# Patient Record
Sex: FEMALE | Race: WHITE | NOT HISPANIC OR LATINO | URBAN - METROPOLITAN AREA
[De-identification: names, ages, dates, MRNs, and addresses within clinical notes are randomized per-mention and may not be internally consistent; named-entity substitution may affect disease eponyms.]

---

## 2019-08-22 ENCOUNTER — INPATIENT (INPATIENT)
Facility: HOSPITAL | Age: 64
LOS: 1 days | Discharge: HOME | DRG: 472 | End: 2019-08-24
Attending: SPECIALIST | Admitting: SPECIALIST
Payer: COMMERCIAL

## 2019-08-22 VITALS
DIASTOLIC BLOOD PRESSURE: 81 MMHG | RESPIRATION RATE: 18 BRPM | HEART RATE: 82 BPM | WEIGHT: 102.07 LBS | SYSTOLIC BLOOD PRESSURE: 133 MMHG | TEMPERATURE: 98 F | OXYGEN SATURATION: 97 % | HEIGHT: 60 IN

## 2019-08-22 DIAGNOSIS — Z41.9 ENCOUNTER FOR PROCEDURE FOR PURPOSES OTHER THAN REMEDYING HEALTH STATE, UNSPECIFIED: Chronic | ICD-10-CM

## 2019-08-22 DIAGNOSIS — M48.02 SPINAL STENOSIS, CERVICAL REGION: ICD-10-CM

## 2019-08-22 DIAGNOSIS — D62 ACUTE POSTHEMORRHAGIC ANEMIA: ICD-10-CM

## 2019-08-22 DIAGNOSIS — F41.1 GENERALIZED ANXIETY DISORDER: ICD-10-CM

## 2019-08-22 DIAGNOSIS — J30.9 ALLERGIC RHINITIS, UNSPECIFIED: ICD-10-CM

## 2019-08-22 LAB
ANION GAP SERPL CALC-SCNC: 11 MMOL/L — SIGNIFICANT CHANGE UP (ref 5–17)
BASOPHILS # BLD AUTO: 0 K/UL — SIGNIFICANT CHANGE UP (ref 0–0.2)
BASOPHILS NFR BLD AUTO: 0 % — SIGNIFICANT CHANGE UP (ref 0–2)
BUN SERPL-MCNC: 13 MG/DL — SIGNIFICANT CHANGE UP (ref 7–23)
CALCIUM SERPL-MCNC: 8.2 MG/DL — LOW (ref 8.4–10.5)
CHLORIDE SERPL-SCNC: 106 MMOL/L — SIGNIFICANT CHANGE UP (ref 96–108)
CO2 SERPL-SCNC: 23 MMOL/L — SIGNIFICANT CHANGE UP (ref 22–31)
CREAT SERPL-MCNC: 0.67 MG/DL — SIGNIFICANT CHANGE UP (ref 0.5–1.3)
EOSINOPHIL # BLD AUTO: 0 K/UL — SIGNIFICANT CHANGE UP (ref 0–0.5)
EOSINOPHIL NFR BLD AUTO: 0 % — SIGNIFICANT CHANGE UP (ref 0–6)
GLUCOSE SERPL-MCNC: 118 MG/DL — HIGH (ref 70–99)
HCT VFR BLD CALC: 32.6 % — LOW (ref 34.5–45)
HGB BLD-MCNC: 11.3 G/DL — LOW (ref 11.5–15.5)
LYMPHOCYTES # BLD AUTO: 0.23 K/UL — LOW (ref 1–3.3)
LYMPHOCYTES # BLD AUTO: 2.6 % — LOW (ref 13–44)
MCHC RBC-ENTMCNC: 32.3 PG — SIGNIFICANT CHANGE UP (ref 27–34)
MCHC RBC-ENTMCNC: 34.7 GM/DL — SIGNIFICANT CHANGE UP (ref 32–36)
MCV RBC AUTO: 93.1 FL — SIGNIFICANT CHANGE UP (ref 80–100)
MONOCYTES # BLD AUTO: 0.08 K/UL — SIGNIFICANT CHANGE UP (ref 0–0.9)
MONOCYTES NFR BLD AUTO: 0.9 % — LOW (ref 2–14)
NEUTROPHILS # BLD AUTO: 8.61 K/UL — HIGH (ref 1.8–7.4)
NEUTROPHILS NFR BLD AUTO: 78.1 % — HIGH (ref 43–77)
PLATELET # BLD AUTO: 212 K/UL — SIGNIFICANT CHANGE UP (ref 150–400)
POTASSIUM SERPL-MCNC: 3.8 MMOL/L — SIGNIFICANT CHANGE UP (ref 3.5–5.3)
POTASSIUM SERPL-SCNC: 3.8 MMOL/L — SIGNIFICANT CHANGE UP (ref 3.5–5.3)
RBC # BLD: 3.5 M/UL — LOW (ref 3.8–5.2)
RBC # FLD: 12.2 % — SIGNIFICANT CHANGE UP (ref 10.3–14.5)
SODIUM SERPL-SCNC: 140 MMOL/L — SIGNIFICANT CHANGE UP (ref 135–145)
WBC # BLD: 9.01 K/UL — SIGNIFICANT CHANGE UP (ref 3.8–10.5)
WBC # FLD AUTO: 9.01 K/UL — SIGNIFICANT CHANGE UP (ref 3.8–10.5)

## 2019-08-22 RX ORDER — ONDANSETRON 8 MG/1
4 TABLET, FILM COATED ORAL EVERY 6 HOURS
Refills: 0 | Status: DISCONTINUED | OUTPATIENT
Start: 2019-08-22 | End: 2019-08-24

## 2019-08-22 RX ORDER — OXYCODONE AND ACETAMINOPHEN 5; 325 MG/1; MG/1
1 TABLET ORAL EVERY 4 HOURS
Refills: 0 | Status: DISCONTINUED | OUTPATIENT
Start: 2019-08-22 | End: 2019-08-22

## 2019-08-22 RX ORDER — SODIUM CHLORIDE 9 MG/ML
1000 INJECTION, SOLUTION INTRAVENOUS
Refills: 0 | Status: DISCONTINUED | OUTPATIENT
Start: 2019-08-22 | End: 2019-08-24

## 2019-08-22 RX ORDER — MAGNESIUM HYDROXIDE 400 MG/1
30 TABLET, CHEWABLE ORAL EVERY 12 HOURS
Refills: 0 | Status: DISCONTINUED | OUTPATIENT
Start: 2019-08-22 | End: 2019-08-24

## 2019-08-22 RX ORDER — FAMOTIDINE 10 MG/ML
20 INJECTION INTRAVENOUS EVERY 12 HOURS
Refills: 0 | Status: DISCONTINUED | OUTPATIENT
Start: 2019-08-22 | End: 2019-08-24

## 2019-08-22 RX ORDER — OXYCODONE HYDROCHLORIDE 5 MG/1
10 TABLET ORAL EVERY 4 HOURS
Refills: 0 | Status: DISCONTINUED | OUTPATIENT
Start: 2019-08-22 | End: 2019-08-24

## 2019-08-22 RX ORDER — HYDROMORPHONE HYDROCHLORIDE 2 MG/ML
0.5 INJECTION INTRAMUSCULAR; INTRAVENOUS; SUBCUTANEOUS ONCE
Refills: 0 | Status: DISCONTINUED | OUTPATIENT
Start: 2019-08-22 | End: 2019-08-24

## 2019-08-22 RX ORDER — DOCUSATE SODIUM 100 MG
100 CAPSULE ORAL THREE TIMES A DAY
Refills: 0 | Status: DISCONTINUED | OUTPATIENT
Start: 2019-08-22 | End: 2019-08-24

## 2019-08-22 RX ORDER — CEFAZOLIN SODIUM 1 G
2000 VIAL (EA) INJECTION EVERY 8 HOURS
Refills: 0 | Status: COMPLETED | OUTPATIENT
Start: 2019-08-22 | End: 2019-08-23

## 2019-08-22 RX ORDER — ACETAMINOPHEN 500 MG
650 TABLET ORAL EVERY 6 HOURS
Refills: 0 | Status: DISCONTINUED | OUTPATIENT
Start: 2019-08-22 | End: 2019-08-24

## 2019-08-22 RX ORDER — CEFAZOLIN SODIUM 1 G
2000 VIAL (EA) INJECTION EVERY 8 HOURS
Refills: 0 | Status: DISCONTINUED | OUTPATIENT
Start: 2019-08-22 | End: 2019-08-22

## 2019-08-22 RX ORDER — BUPIVACAINE 13.3 MG/ML
20 INJECTION, SUSPENSION, LIPOSOMAL INFILTRATION ONCE
Refills: 0 | Status: DISCONTINUED | OUTPATIENT
Start: 2019-08-22 | End: 2019-08-24

## 2019-08-22 RX ORDER — OXYCODONE AND ACETAMINOPHEN 5; 325 MG/1; MG/1
2 TABLET ORAL EVERY 6 HOURS
Refills: 0 | Status: DISCONTINUED | OUTPATIENT
Start: 2019-08-22 | End: 2019-08-22

## 2019-08-22 RX ORDER — METOPROLOL TARTRATE 50 MG
200 TABLET ORAL DAILY
Refills: 0 | Status: DISCONTINUED | OUTPATIENT
Start: 2019-08-22 | End: 2019-08-24

## 2019-08-22 RX ORDER — HYDROMORPHONE HYDROCHLORIDE 2 MG/ML
0.5 INJECTION INTRAMUSCULAR; INTRAVENOUS; SUBCUTANEOUS
Refills: 0 | Status: DISCONTINUED | OUTPATIENT
Start: 2019-08-22 | End: 2019-08-22

## 2019-08-22 RX ORDER — HYDROMORPHONE HYDROCHLORIDE 2 MG/ML
0.5 INJECTION INTRAMUSCULAR; INTRAVENOUS; SUBCUTANEOUS
Refills: 0 | Status: DISCONTINUED | OUTPATIENT
Start: 2019-08-22 | End: 2019-08-24

## 2019-08-22 RX ORDER — SENNA PLUS 8.6 MG/1
2 TABLET ORAL AT BEDTIME
Refills: 0 | Status: DISCONTINUED | OUTPATIENT
Start: 2019-08-22 | End: 2019-08-24

## 2019-08-22 RX ORDER — NALOXONE HYDROCHLORIDE 4 MG/.1ML
0.1 SPRAY NASAL
Refills: 0 | Status: DISCONTINUED | OUTPATIENT
Start: 2019-08-22 | End: 2019-08-24

## 2019-08-22 RX ORDER — HYDROMORPHONE HYDROCHLORIDE 2 MG/ML
0.5 INJECTION INTRAMUSCULAR; INTRAVENOUS; SUBCUTANEOUS ONCE
Refills: 0 | Status: DISCONTINUED | OUTPATIENT
Start: 2019-08-22 | End: 2019-08-22

## 2019-08-22 RX ORDER — HYDROMORPHONE HYDROCHLORIDE 2 MG/ML
0.5 INJECTION INTRAMUSCULAR; INTRAVENOUS; SUBCUTANEOUS EVERY 4 HOURS
Refills: 0 | Status: DISCONTINUED | OUTPATIENT
Start: 2019-08-22 | End: 2019-08-22

## 2019-08-22 RX ORDER — HYDROMORPHONE HYDROCHLORIDE 2 MG/ML
30 INJECTION INTRAMUSCULAR; INTRAVENOUS; SUBCUTANEOUS
Refills: 0 | Status: DISCONTINUED | OUTPATIENT
Start: 2019-08-22 | End: 2019-08-22

## 2019-08-22 RX ORDER — CHLORHEXIDINE GLUCONATE 213 G/1000ML
1 SOLUTION TOPICAL ONCE
Refills: 0 | Status: COMPLETED | OUTPATIENT
Start: 2019-08-22 | End: 2019-08-22

## 2019-08-22 RX ORDER — OXYCODONE HYDROCHLORIDE 5 MG/1
5 TABLET ORAL EVERY 4 HOURS
Refills: 0 | Status: DISCONTINUED | OUTPATIENT
Start: 2019-08-22 | End: 2019-08-24

## 2019-08-22 RX ORDER — CYCLOBENZAPRINE HYDROCHLORIDE 10 MG/1
5 TABLET, FILM COATED ORAL THREE TIMES A DAY
Refills: 0 | Status: DISCONTINUED | OUTPATIENT
Start: 2019-08-22 | End: 2019-08-24

## 2019-08-22 RX ORDER — LEVOTHYROXINE SODIUM 125 MCG
75 TABLET ORAL DAILY
Refills: 0 | Status: DISCONTINUED | OUTPATIENT
Start: 2019-08-22 | End: 2019-08-24

## 2019-08-22 RX ADMIN — HYDROMORPHONE HYDROCHLORIDE 0.5 MILLIGRAM(S): 2 INJECTION INTRAMUSCULAR; INTRAVENOUS; SUBCUTANEOUS at 15:46

## 2019-08-22 RX ADMIN — OXYCODONE HYDROCHLORIDE 5 MILLIGRAM(S): 5 TABLET ORAL at 19:38

## 2019-08-22 RX ADMIN — HYDROMORPHONE HYDROCHLORIDE 0.5 MILLIGRAM(S): 2 INJECTION INTRAMUSCULAR; INTRAVENOUS; SUBCUTANEOUS at 16:44

## 2019-08-22 RX ADMIN — SENNA PLUS 2 TABLET(S): 8.6 TABLET ORAL at 22:45

## 2019-08-22 RX ADMIN — Medication 100 MILLIGRAM(S): at 22:45

## 2019-08-22 RX ADMIN — HYDROMORPHONE HYDROCHLORIDE 0.5 MILLIGRAM(S): 2 INJECTION INTRAMUSCULAR; INTRAVENOUS; SUBCUTANEOUS at 18:23

## 2019-08-22 RX ADMIN — OXYCODONE HYDROCHLORIDE 10 MILLIGRAM(S): 5 TABLET ORAL at 23:01

## 2019-08-22 RX ADMIN — HYDROMORPHONE HYDROCHLORIDE 0.5 MILLIGRAM(S): 2 INJECTION INTRAMUSCULAR; INTRAVENOUS; SUBCUTANEOUS at 15:38

## 2019-08-22 RX ADMIN — SODIUM CHLORIDE 100 MILLILITER(S): 9 INJECTION, SOLUTION INTRAVENOUS at 16:03

## 2019-08-22 RX ADMIN — HYDROMORPHONE HYDROCHLORIDE 0.5 MILLIGRAM(S): 2 INJECTION INTRAMUSCULAR; INTRAVENOUS; SUBCUTANEOUS at 16:01

## 2019-08-22 RX ADMIN — CHLORHEXIDINE GLUCONATE 1 APPLICATION(S): 213 SOLUTION TOPICAL at 07:24

## 2019-08-22 RX ADMIN — HYDROMORPHONE HYDROCHLORIDE 0.5 MILLIGRAM(S): 2 INJECTION INTRAMUSCULAR; INTRAVENOUS; SUBCUTANEOUS at 15:14

## 2019-08-22 RX ADMIN — Medication 2000 MILLIGRAM(S): at 22:45

## 2019-08-22 RX ADMIN — OXYCODONE HYDROCHLORIDE 5 MILLIGRAM(S): 5 TABLET ORAL at 20:38

## 2019-08-22 NOTE — H&P ADULT - HISTORY OF PRESENT ILLNESS
8/22/19 730a  63 y.o F with neck pain x chronic. Symptoms continue despite conservative treatment.    Presents today for elective cervical spine surgery - cervical fusion/decompression.  Denies radiating pain or numbness. Subjectively some intermittent weakness of UE (dropping objects. Denies incontinence or LE  symptoms. No fever, chills , or recent illness. No other complaints.     Right hand dominant. Independent ambulator.

## 2019-08-22 NOTE — H&P ADULT - NSICDXPASTSURGICALHX_GEN_ALL_CORE_FT
PAST SURGICAL HISTORY:  Surgery, elective left foot x 6 surgeries    Surgery, elective shoulder -right x 3    Surgery, elective infertility procedures

## 2019-08-22 NOTE — BRIEF OPERATIVE NOTE - NSICDXBRIEFPROCEDURE_GEN_ALL_CORE_FT
PROCEDURES:  Anterior cervical discectomy and fusion (ACDF) at 4 levels 22-Aug-2019 15:30:49  Carito Russell

## 2019-08-22 NOTE — H&P ADULT - NSHPPHYSICALEXAM_GEN_ALL_CORE
NAD    MSK:  Cervical spine ROM limited secondary to pain  gross sensation intact to b/l UE/LE to light touch  neuromotor intact b/l UE/LE distally  calf soft, compressible b/l   radial palpable    rest of PE per medical clearance note

## 2019-08-23 DIAGNOSIS — E03.9 HYPOTHYROIDISM, UNSPECIFIED: ICD-10-CM

## 2019-08-23 DIAGNOSIS — Z86.39 PERSONAL HISTORY OF OTHER ENDOCRINE, NUTRITIONAL AND METABOLIC DISEASE: ICD-10-CM

## 2019-08-23 LAB
ANION GAP SERPL CALC-SCNC: 12 MMOL/L — SIGNIFICANT CHANGE UP (ref 5–17)
BASOPHILS # BLD AUTO: 0.01 K/UL — SIGNIFICANT CHANGE UP (ref 0–0.2)
BASOPHILS NFR BLD AUTO: 0.2 % — SIGNIFICANT CHANGE UP (ref 0–2)
BUN SERPL-MCNC: 9 MG/DL — SIGNIFICANT CHANGE UP (ref 7–23)
CALCIUM SERPL-MCNC: 8.6 MG/DL — SIGNIFICANT CHANGE UP (ref 8.4–10.5)
CHLORIDE SERPL-SCNC: 104 MMOL/L — SIGNIFICANT CHANGE UP (ref 96–108)
CO2 SERPL-SCNC: 25 MMOL/L — SIGNIFICANT CHANGE UP (ref 22–31)
CREAT SERPL-MCNC: 0.79 MG/DL — SIGNIFICANT CHANGE UP (ref 0.5–1.3)
EOSINOPHIL # BLD AUTO: 0.01 K/UL — SIGNIFICANT CHANGE UP (ref 0–0.5)
EOSINOPHIL NFR BLD AUTO: 0.2 % — SIGNIFICANT CHANGE UP (ref 0–6)
GLUCOSE SERPL-MCNC: 101 MG/DL — HIGH (ref 70–99)
HCT VFR BLD CALC: 32.2 % — LOW (ref 34.5–45)
HCV AB S/CO SERPL IA: 0.12 S/CO — SIGNIFICANT CHANGE UP
HCV AB SERPL-IMP: SIGNIFICANT CHANGE UP
HGB BLD-MCNC: 11 G/DL — LOW (ref 11.5–15.5)
IMM GRANULOCYTES NFR BLD AUTO: 0.3 % — SIGNIFICANT CHANGE UP (ref 0–1.5)
LYMPHOCYTES # BLD AUTO: 0.89 K/UL — LOW (ref 1–3.3)
LYMPHOCYTES # BLD AUTO: 13.7 % — SIGNIFICANT CHANGE UP (ref 13–44)
MCHC RBC-ENTMCNC: 32.7 PG — SIGNIFICANT CHANGE UP (ref 27–34)
MCHC RBC-ENTMCNC: 34.2 GM/DL — SIGNIFICANT CHANGE UP (ref 32–36)
MCV RBC AUTO: 95.8 FL — SIGNIFICANT CHANGE UP (ref 80–100)
MONOCYTES # BLD AUTO: 0.23 K/UL — SIGNIFICANT CHANGE UP (ref 0–0.9)
MONOCYTES NFR BLD AUTO: 3.5 % — SIGNIFICANT CHANGE UP (ref 2–14)
NEUTROPHILS # BLD AUTO: 5.32 K/UL — SIGNIFICANT CHANGE UP (ref 1.8–7.4)
NEUTROPHILS NFR BLD AUTO: 82.1 % — HIGH (ref 43–77)
NRBC # BLD: 0 /100 WBCS — SIGNIFICANT CHANGE UP (ref 0–0)
PLATELET # BLD AUTO: 205 K/UL — SIGNIFICANT CHANGE UP (ref 150–400)
POTASSIUM SERPL-MCNC: 4.1 MMOL/L — SIGNIFICANT CHANGE UP (ref 3.5–5.3)
POTASSIUM SERPL-SCNC: 4.1 MMOL/L — SIGNIFICANT CHANGE UP (ref 3.5–5.3)
RBC # BLD: 3.36 M/UL — LOW (ref 3.8–5.2)
RBC # FLD: 12.5 % — SIGNIFICANT CHANGE UP (ref 10.3–14.5)
SODIUM SERPL-SCNC: 141 MMOL/L — SIGNIFICANT CHANGE UP (ref 135–145)
SURGICAL PATHOLOGY STUDY: SIGNIFICANT CHANGE UP
WBC # BLD: 6.48 K/UL — SIGNIFICANT CHANGE UP (ref 3.8–10.5)
WBC # FLD AUTO: 6.48 K/UL — SIGNIFICANT CHANGE UP (ref 3.8–10.5)

## 2019-08-23 PROCEDURE — 72040 X-RAY EXAM NECK SPINE 2-3 VW: CPT | Mod: 26

## 2019-08-23 RX ORDER — ACETAMINOPHEN 500 MG
2 TABLET ORAL
Qty: 0 | Refills: 0 | DISCHARGE
Start: 2019-08-23

## 2019-08-23 RX ORDER — DOCUSATE SODIUM 100 MG
1 CAPSULE ORAL
Qty: 0 | Refills: 0 | DISCHARGE
Start: 2019-08-23

## 2019-08-23 RX ORDER — SENNA PLUS 8.6 MG/1
2 TABLET ORAL
Qty: 0 | Refills: 0 | DISCHARGE
Start: 2019-08-23

## 2019-08-23 RX ORDER — DEXAMETHASONE 0.5 MG/5ML
6 ELIXIR ORAL EVERY 6 HOURS
Refills: 0 | Status: DISCONTINUED | OUTPATIENT
Start: 2019-08-23 | End: 2019-08-24

## 2019-08-23 RX ADMIN — Medication 75 MICROGRAM(S): at 05:53

## 2019-08-23 RX ADMIN — Medication 100 MILLIGRAM(S): at 05:54

## 2019-08-23 RX ADMIN — OXYCODONE HYDROCHLORIDE 10 MILLIGRAM(S): 5 TABLET ORAL at 11:49

## 2019-08-23 RX ADMIN — OXYCODONE HYDROCHLORIDE 10 MILLIGRAM(S): 5 TABLET ORAL at 03:58

## 2019-08-23 RX ADMIN — OXYCODONE HYDROCHLORIDE 10 MILLIGRAM(S): 5 TABLET ORAL at 16:10

## 2019-08-23 RX ADMIN — OXYCODONE HYDROCHLORIDE 10 MILLIGRAM(S): 5 TABLET ORAL at 04:27

## 2019-08-23 RX ADMIN — OXYCODONE HYDROCHLORIDE 10 MILLIGRAM(S): 5 TABLET ORAL at 12:47

## 2019-08-23 RX ADMIN — CYCLOBENZAPRINE HYDROCHLORIDE 5 MILLIGRAM(S): 10 TABLET, FILM COATED ORAL at 16:10

## 2019-08-23 RX ADMIN — Medication 1 TABLET(S): at 11:49

## 2019-08-23 RX ADMIN — Medication 2000 MILLIGRAM(S): at 05:54

## 2019-08-23 RX ADMIN — SENNA PLUS 2 TABLET(S): 8.6 TABLET ORAL at 21:28

## 2019-08-23 RX ADMIN — Medication 100 MILLIGRAM(S): at 21:28

## 2019-08-23 RX ADMIN — Medication 200 MILLIGRAM(S): at 06:48

## 2019-08-23 RX ADMIN — OXYCODONE HYDROCHLORIDE 10 MILLIGRAM(S): 5 TABLET ORAL at 00:00

## 2019-08-23 RX ADMIN — Medication 100 MILLIGRAM(S): at 16:10

## 2019-08-23 NOTE — DISCHARGE NOTE PROVIDER - NSDCCPGOAL_GEN_ALL_CORE_FT
Breastfeeding Services Note:    Consult time spent with this mother/baby:16 minutes for late prematurity, prolonged separation between mom/baby, and maternal teaching     NICU Infant and Family seen as follow up visit for lactation consultation in preparation for Mom's discharge from Hospital.       Teaching reinforced in the following:    -Pumping minimums  -Using pumping log  -Milk storage guidelines for NICU baby   -Pump options for home use  -hands-on pumping  -Appropriate expectations for gestational age  -Pump flange size appropriate  -breast pump kit cleaning/sanitizing  -Nipple Shield use  -Community Resources    Reviewed the above with the mother. She states she continues to pump consistently every 3 hours. She states she seeing more colostrum today but still lower volumes. Reassured.   Encouraged to continue pumping every 2-3 hours of 10-15 minutes.   She states she has Spectra breast pump for home use.  Was given more syringes for breast milk collection. Has labels.  Discussed feeding cues. Encouraged to call for feeding assistance if needed. She would need to alert the NICU staff to page lactation if needed.  Denied further needs at this time.    Lactation will continue to follow.      
To get better and follow your care plan as instructed.

## 2019-08-23 NOTE — DISCHARGE NOTE PROVIDER - NSDCCPTREATMENT_GEN_ALL_CORE_FT
PRINCIPAL PROCEDURE  Procedure: Anterior cervical discectomy and fusion (ACDF) at 4 levels  Findings and Treatment:

## 2019-08-23 NOTE — DISCHARGE NOTE PROVIDER - HOSPITAL COURSE
Admitted    Surgery    Radha-op Antibiotics    Pain control    DVT prophylaxis    OOB/Physical Therapy

## 2019-08-23 NOTE — OCCUPATIONAL THERAPY INITIAL EVALUATION ADULT - FINE MOTOR COORDINATION, RIGHT HAND, MANIPULATE OBJECTS, OT EVAL
mild impairment/Patient reports difficulty with container management pre-op, however observed today manipulating items effortlessly

## 2019-08-23 NOTE — DISCHARGE NOTE PROVIDER - NSDCFUADDINST_GEN_ALL_CORE_FT
No strenuous activity (bending/twisting), heavy lifting, driving or returning to work until cleared by MD.  You may shower, keep incision clean and dry.   Try to have regular bowel movements, take stool softener or laxative if necessary.  May take pepcid or zantac for upset stomach.  May apply ice to affected areas to decrease swelling.  Call to schedule an appt with Dr. Ricardo for follow up, if you have staples or sutures they will be removed in office.  Contact your doctor if you experience: fever greater than 101.5, chills, chest pain, difficulty breathing, redness or excessive drainage around the incision, other concerns.  Follow up with your primary care provider.

## 2019-08-23 NOTE — OCCUPATIONAL THERAPY INITIAL EVALUATION ADULT - GENERAL OBSERVATIONS, REHAB EVAL
Patient right hand dominant. Chart reviewed, SATINDER Galindo cleared patient for OT evaluation. Patient received semi-supine in bed, NAD, +IV, +SCDs, +modi, +tele, +miami-J collar, +hemovac.

## 2019-08-23 NOTE — OCCUPATIONAL THERAPY INITIAL EVALUATION ADULT - STANDING BALANCE: DYNAMIC, REHAB EVAL
Patient ambulated ~10ft to stretcher bed in hallway with no AD and supervision for safety and line management, no LOB/good minus

## 2019-08-23 NOTE — OCCUPATIONAL THERAPY INITIAL EVALUATION ADULT - MD ORDER
63 y.o F with neck pain x chronic. Symptoms continue despite conservative treatment. Presents today for elective cervical spine surgery - cervical fusion/decompression. Denies radiating pain or numbness. Subjectively some intermittent weakness of UE (dropping objects. Denies incontinence or LE  symptoms. No fever, chills , or recent illness. No other complaints. Right hand dominant. Independent ambulator.

## 2019-08-23 NOTE — DISCHARGE NOTE PROVIDER - REASON FOR ADMISSION
2nd attempt to call patient. Left voicemail. Please give information below.    Noelle Irwin MA      neck pain/surgery

## 2019-08-23 NOTE — DISCHARGE NOTE PROVIDER - NSDCACTIVITY_GEN_ALL_CORE
No heavy lifting/straining/Walking - Indoors allowed/Showering allowed/Do not drive or operate machinery

## 2019-08-23 NOTE — DISCHARGE NOTE PROVIDER - NSDCCPCAREPLAN_GEN_ALL_CORE_FT
PRINCIPAL DISCHARGE DIAGNOSIS  Diagnosis: Cervical stenosis of spine  Assessment and Plan of Treatment: Cervical stenosis of spine

## 2019-08-23 NOTE — OCCUPATIONAL THERAPY INITIAL EVALUATION ADULT - ADDITIONAL COMMENTS
Patient reports independence with ADLs PTA, uses a rollator for mobility and a shower chair, also owns a SC

## 2019-08-23 NOTE — OCCUPATIONAL THERAPY INITIAL EVALUATION ADULT - LIVES WITH, PROFILE
with a "house mate" who rents a room from her, home with 1 step to enter, +1 flight to bedroom (can avoid and stay on 1st floor)

## 2019-08-23 NOTE — DISCHARGE NOTE PROVIDER - CARE PROVIDER_API CALL
Parish Ricardo)  Orthopaedic Surgery  [facility], [place] [address]  Phone: [phone]  Fax: [phone]  Follow Up Time:

## 2019-08-24 VITALS
HEART RATE: 82 BPM | SYSTOLIC BLOOD PRESSURE: 138 MMHG | TEMPERATURE: 98 F | OXYGEN SATURATION: 98 % | RESPIRATION RATE: 17 BRPM

## 2019-08-24 LAB
ANION GAP SERPL CALC-SCNC: 8 MMOL/L — SIGNIFICANT CHANGE UP (ref 5–17)
BASOPHILS # BLD AUTO: 0.08 K/UL — SIGNIFICANT CHANGE UP (ref 0–0.2)
BASOPHILS NFR BLD AUTO: 0.9 % — SIGNIFICANT CHANGE UP (ref 0–2)
BUN SERPL-MCNC: 11 MG/DL — SIGNIFICANT CHANGE UP (ref 7–23)
CALCIUM SERPL-MCNC: 8.7 MG/DL — SIGNIFICANT CHANGE UP (ref 8.4–10.5)
CHLORIDE SERPL-SCNC: 102 MMOL/L — SIGNIFICANT CHANGE UP (ref 96–108)
CO2 SERPL-SCNC: 31 MMOL/L — SIGNIFICANT CHANGE UP (ref 22–31)
CREAT SERPL-MCNC: 0.79 MG/DL — SIGNIFICANT CHANGE UP (ref 0.5–1.3)
EOSINOPHIL # BLD AUTO: 0.08 K/UL — SIGNIFICANT CHANGE UP (ref 0–0.5)
EOSINOPHIL NFR BLD AUTO: 0.9 % — SIGNIFICANT CHANGE UP (ref 0–6)
GLUCOSE SERPL-MCNC: 119 MG/DL — HIGH (ref 70–99)
HCT VFR BLD CALC: 34.3 % — LOW (ref 34.5–45)
HGB BLD-MCNC: 11.2 G/DL — LOW (ref 11.5–15.5)
LYMPHOCYTES # BLD AUTO: 0.16 K/UL — LOW (ref 1–3.3)
LYMPHOCYTES # BLD AUTO: 1.8 % — LOW (ref 13–44)
MCHC RBC-ENTMCNC: 31.9 PG — SIGNIFICANT CHANGE UP (ref 27–34)
MCHC RBC-ENTMCNC: 32.7 GM/DL — SIGNIFICANT CHANGE UP (ref 32–36)
MCV RBC AUTO: 97.7 FL — SIGNIFICANT CHANGE UP (ref 80–100)
MONOCYTES # BLD AUTO: 0 K/UL — SIGNIFICANT CHANGE UP (ref 0–0.9)
MONOCYTES NFR BLD AUTO: 0 % — LOW (ref 2–14)
NEUTROPHILS # BLD AUTO: 8.48 K/UL — HIGH (ref 1.8–7.4)
NEUTROPHILS NFR BLD AUTO: 92 % — HIGH (ref 43–77)
PLATELET # BLD AUTO: 218 K/UL — SIGNIFICANT CHANGE UP (ref 150–400)
POTASSIUM SERPL-MCNC: 4.6 MMOL/L — SIGNIFICANT CHANGE UP (ref 3.5–5.3)
POTASSIUM SERPL-SCNC: 4.6 MMOL/L — SIGNIFICANT CHANGE UP (ref 3.5–5.3)
RBC # BLD: 3.51 M/UL — LOW (ref 3.8–5.2)
RBC # FLD: 13.1 % — SIGNIFICANT CHANGE UP (ref 10.3–14.5)
SODIUM SERPL-SCNC: 141 MMOL/L — SIGNIFICANT CHANGE UP (ref 135–145)
WBC # BLD: 8.8 K/UL — SIGNIFICANT CHANGE UP (ref 3.8–10.5)
WBC # FLD AUTO: 8.8 K/UL — SIGNIFICANT CHANGE UP (ref 3.8–10.5)

## 2019-08-24 RX ORDER — OXYCODONE HYDROCHLORIDE 5 MG/1
1 TABLET ORAL
Qty: 42 | Refills: 0
Start: 2019-08-24 | End: 2019-08-30

## 2019-08-24 RX ADMIN — Medication 6 MILLIGRAM(S): at 06:52

## 2019-08-24 RX ADMIN — Medication 200 MILLIGRAM(S): at 06:52

## 2019-08-24 RX ADMIN — Medication 75 MICROGRAM(S): at 06:52

## 2019-08-24 RX ADMIN — Medication 100 MILLIGRAM(S): at 13:20

## 2019-08-24 RX ADMIN — Medication 100 MILLIGRAM(S): at 06:52

## 2019-08-24 RX ADMIN — Medication 1 TABLET(S): at 12:32

## 2019-08-24 RX ADMIN — Medication 6 MILLIGRAM(S): at 01:46

## 2019-08-24 NOTE — PROGRESS NOTE ADULT - SUBJECTIVE AND OBJECTIVE BOX
HPI:  63 year old female with spinal stenosis cervical spine confirmed with MRI. Patient with moderate neck pain worse with twisting motions and persists over time.  No relief with PT or conservative therapy.  Patient day #2 s/p ACDF C3-C7 with moderate neck pain and malaise.    PAST MEDICAL & SURGICAL HISTORY:  H/O: hypothyroidism  Cervical stenosis of spine  Surgery, elective: infertility procedures  Surgery, elective: shoulder -right x 3  Surgery, elective: left foot x 6 surgeries      MEDICATIONS  (STANDING):  BUpivacaine liposome 1.3% Injectable (no eMAR) 20 milliLiter(s) Local Injection once  dexamethasone  Injectable 6 milliGRAM(s) IV Push every 6 hours  docusate sodium 100 milliGRAM(s) Oral three times a day  lactated ringers. 1000 milliLiter(s) (100 mL/Hr) IV Continuous <Continuous>  levothyroxine 75 MICROGram(s) Oral daily  metoprolol succinate  milliGRAM(s) Oral daily  multivitamin 1 Tablet(s) Oral daily  senna 2 Tablet(s) Oral at bedtime    MEDICATIONS  (PRN):  acetaminophen   Tablet .. 650 milliGRAM(s) Oral every 6 hours PRN Temp greater or equal to 38C (100.4F), Mild Pain (1 - 3)  cyclobenzaprine 5 milliGRAM(s) Oral three times a day PRN Muscle Spasm  famotidine    Tablet 20 milliGRAM(s) Oral every 12 hours PRN Dyspepsia  HYDROmorphone  Injectable 0.5 milliGRAM(s) IV Push every 2 hours PRN breakthrough pain  HYDROmorphone  Injectable 0.5 milliGRAM(s) IV Push once PRN breakthrough pain  HYDROmorphone  Injectable 0.5 milliGRAM(s) IV Push once PRN breakthrough pain  magnesium hydroxide Suspension 30 milliLiter(s) Oral every 12 hours PRN Constipation  naloxone Injectable 0.1 milliGRAM(s) IV Push every 3 minutes PRN For ANY of the following changes in patient status:  A. RR LESS THAN 10 breaths per minute, B. Oxygen saturation LESS THAN 90%, C. Sedation score of 6  ondansetron Injectable 4 milliGRAM(s) IV Push every 6 hours PRN Nausea  oxyCODONE    IR 5 milliGRAM(s) Oral every 4 hours PRN Moderate Pain (4 - 6)  oxyCODONE    IR 10 milliGRAM(s) Oral every 4 hours PRN Severe Pain (7 - 10)    Allergies    No Known Allergies    REVIEW OF SYSTEMS    General:  malaise	  Skin/Breast: normal  Ophthalmologic: negative  ENMT:	normal  Respiratory and Thorax: normal  Cardiovascular:	normal  Gastrointestinal:	normal  Genitourinary:	normal  Musculoskeletal:	 neck pain  Neurological:	normal  Psychiatric:	normal  Hematology/Lymphatics:	 negative  Endocrine:	negative  Allergic/Immunologic:	negative      PHYSICAL EXAM:     Vital Signs Last 24 Hrs  T(C): 36.4 (24 Aug 2019 05:03), Max: 37 (23 Aug 2019 14:07)  T(F): 97.5 (24 Aug 2019 05:03), Max: 98.6 (23 Aug 2019 14:07)  HR: 85 (24 Aug 2019 05:03) (82 - 95)  BP: 134/84 (24 Aug 2019 06:55) (87/- - 140/81)  BP(mean): --  RR: 18 (24 Aug 2019 05:03) (18 - 18)  SpO2: 97% (24 Aug 2019 05:03) (96% - 97%)    Constitutional: WDWNF in NAD  Eyes: conj pale  ENMT: negative  Neck:  decreased ROM  Breasts: not examined   Back: negative  Respiratory: clear to P&A  Cardiovascular: no MRGT or H  Gastrointestinal: normal bowel sounds  Genitourinary: neg  Rectal: not examined  Extremities: normal  Vascular: normal  Neurological: normal  Skin: negative  Lymph Nodes: negative  Musculoskeletal:   normal  Psychiatric: anxiety    LABS:                       11.0   6.48  )-----------( 205      ( 23 Aug 2019 06:33 )             32.2       08-24    141  |  102  |  11  ----------------------------<  119<H>  4.6   |  31  |  0.79    Ca    8.7      24 Aug 2019 06:22
HPI:  63 year old female with spinal stenosis cervical spine confirmed with MRI. Patient with moderate neck pain worse with twisting motions and persists over time.  No relief with PT or conservative therapy.  Patient day #1 s/p ACDF C3-C7 with moderate neck pain and malaise.    PAST MEDICAL & SURGICAL HISTORY:  H/O: hypothyroidism  Cervical stenosis of spine  Surgery, elective: infertility procedures  Surgery, elective: shoulder -right x 3  Surgery, elective: left foot x 6 surgeries      MEDICATIONS  (STANDING):  BUpivacaine liposome 1.3% Injectable (no eMAR) 20 milliLiter(s) Local Injection once  docusate sodium 100 milliGRAM(s) Oral three times a day  lactated ringers. 1000 milliLiter(s) (100 mL/Hr) IV Continuous <Continuous>  levothyroxine 75 MICROGram(s) Oral daily  metoprolol succinate  milliGRAM(s) Oral daily  multivitamin 1 Tablet(s) Oral daily  senna 2 Tablet(s) Oral at bedtime    MEDICATIONS  (PRN):  acetaminophen   Tablet .. 650 milliGRAM(s) Oral every 6 hours PRN Temp greater or equal to 38C (100.4F), Mild Pain (1 - 3)  cyclobenzaprine 5 milliGRAM(s) Oral three times a day PRN Muscle Spasm  famotidine    Tablet 20 milliGRAM(s) Oral every 12 hours PRN Dyspepsia  HYDROmorphone  Injectable 0.5 milliGRAM(s) IV Push every 2 hours PRN breakthrough pain  HYDROmorphone  Injectable 0.5 milliGRAM(s) IV Push once PRN breakthrough pain  HYDROmorphone  Injectable 0.5 milliGRAM(s) IV Push once PRN breakthrough pain  magnesium hydroxide Suspension 30 milliLiter(s) Oral every 12 hours PRN Constipation  naloxone Injectable 0.1 milliGRAM(s) IV Push every 3 minutes PRN For ANY of the following changes in patient status:  A. RR LESS THAN 10 breaths per minute, B. Oxygen saturation LESS THAN 90%, C. Sedation score of 6  ondansetron Injectable 4 milliGRAM(s) IV Push every 6 hours PRN Nausea  oxyCODONE    IR 5 milliGRAM(s) Oral every 4 hours PRN Moderate Pain (4 - 6)  oxyCODONE    IR 10 milliGRAM(s) Oral every 4 hours PRN Severe Pain (7 - 10)    Allergies    No Known Allergies    REVIEW OF SYSTEMS    General:  malaise	  Skin/Breast: normal  Ophthalmologic: negative  ENMT:	normal  Respiratory and Thorax: normal  Cardiovascular:	normal  Gastrointestinal:	normal  Genitourinary:	normal  Musculoskeletal:	 neck pain  Neurological:	normal  Psychiatric:	normal  Hematology/Lymphatics:	 negative  Endocrine:	negative  Allergic/Immunologic:	negative      PHYSICAL EXAM:  Daily Height in cm: 152.4 (22 Aug 2019 06:52)       Vital Signs Last 24 Hrs  T(C): 36.6 (23 Aug 2019 05:26), Max: 36.6 (23 Aug 2019 05:26)  T(F): 97.8 (23 Aug 2019 05:26), Max: 97.8 (23 Aug 2019 05:26)  HR: 95 (23 Aug 2019 00:51) (70 - 95)  BP: 146/72 (23 Aug 2019 00:51) (109/66 - 146/72)  BP(mean): 100 (23 Aug 2019 00:51) (84 - 106)  RR: 16 (23 Aug 2019 00:51) (11 - 21)  SpO2: 95% (23 Aug 2019 00:51) (94% - 100%)    Constitutional: WDWNF    Eyes: conj pale  ENMT: negative  Neck: decreased ROM  Breasts: not examined   Back: negative  Respiratory: clear to P&A  Cardiovascular: no MRGT or H  Gastrointestinal: normal bowel sounds  Genitourinary: neg  Rectal: not examined  Extremities: normal  Vascular: normal  Neurological: normal  Skin: negative  Lymph Nodes: negative  Musculoskeletal:   normal  Psychiatric: anxiety    LABS:                       11.3   9.01  )-----------( 212      ( 22 Aug 2019 15:50 )             32.6       08-22    140  |  106  |  13  ----------------------------<  118<H>  3.8   |  23  |  0.67    Ca    8.2<L>      22 Aug 2019 15:50
Ortho Post Op Check    Procedure: Lami and PCF C3-C7  Surgeon: Merced     Pt comfortable without complaints, pain controlled  Denies CP, SOB, N/V, numbness/tingling     Vital Signs Last 24 Hrs  T(C): --  T(F): --  HR: 95 (08-23-19 @ 00:51) (95 - 95)  BP: 146/72 (08-23-19 @ 00:51) (146/72 - 146/72)  BP(mean): 100 (08-23-19 @ 00:51) (100 - 100)  RR: 16 (08-23-19 @ 00:51) (16 - 16)  SpO2: 95% (08-23-19 @ 00:51) (95% - 95%)  AVSS    General: Pt Alert and oriented, NAD  Neck DSG C/D/I  HV x1 holding good suction  Pulses: 2+ radial   Sensation: C5-T1 intact BL  Motor strength 5/5 BL  BCR in fingers                           11.3   9.01  )-----------( 212      ( 22 Aug 2019 15:50 )             32.6   22 Aug 2019 15:50    140    |  106    |  13     ----------------------------<  118    3.8     |  23     |  0.67       Drain output:    08-22-19 @ 07:01  -  08-23-19 @ 04:00  --------------------------------------------------------  OUT:    Accordian: 10 mL  Total OUT: 10 mL    Post-op X-Ray: Intraop fluoro    A/P: 63yFemale POD#0 s/p Lami and PCF C3-C7  - Stable  - Pain Control  - DVT ppx: SCDs  - Post op abx: ancef periop  - PT, WBS: WBAT  - dispo home     Ortho Pager [phone]
Orthopaedic Spine Resident Note    S:  No acute overnight events.  Pain well controlled.    No fevers/chills/shortness of breath/chest pain/new neurologic complaints.    Vital Signs Last 24 Hrs  T(C): 36.4 (24 Aug 2019 05:03), Max: 37 (23 Aug 2019 14:07)  T(F): 97.5 (24 Aug 2019 05:03), Max: 98.6 (23 Aug 2019 14:07)  HR: 85 (24 Aug 2019 05:03) (82 - 89)  BP: 134/84 (24 Aug 2019 06:55) (87/- - 140/81)  BP(mean): --  RR: 18 (24 Aug 2019 05:03) (18 - 18)  SpO2: 97% (24 Aug 2019 05:03) (96% - 97%)    VSS  General: Well-appearing adult Female lying supine; NAD; A&Ox3  Back: DSG CDI  Hemovac drains x1 with SS output  Motor:  LUE- Delt/Bicep/Tricep/Wrist Ext/ 5/5 strength   RUE- Delt/Bicep/Tricep/Wrist Ext/ 5/5 strength  Sensory:  LUE- SILT C5-T1 dermatomes  RUE- SILT C5-T1 dermatomes  Vascular:  Hands WWP; Radial 2+ bilaterally; Cap refill < 2 sec    I&O's Detail    23 Aug 2019 07:01  -  24 Aug 2019 07:00  --------------------------------------------------------  IN:    lactated ringers.: 200 mL  Total IN: 200 mL    OUT:    Accordian: 40 mL    Indwelling Catheter - Urethral: 850 mL    Voided: 1440 mL  Total OUT: 2330 mL    Total NET: -2130 mL      24 Aug 2019 07:01  -  24 Aug 2019 12:32  --------------------------------------------------------  IN:    Oral Fluid: 165 mL  Total IN: 165 mL    OUT:    Voided: 275 mL  Total OUT: 275 mL    Total NET: -110 mL              Labs:                              11.2   8.80  )-----------( 218      ( 24 Aug 2019 06:22 )             34.3         A/P: 63y Female s/p Lami and PCF C3-C7 8/22/19    - Stable  - Pain control as per Pain mgmt recs  - Nausea control/Bowel regiment  - Home meds  - PT   - WBAT  - Monitor drain output   - DVT ppx: SCDs  - Dispo: home when clears PT    Ortho Pager [phone]
Orthopaedic Spine Resident Note    S:  No acute overnight events.  Pain well controlled.    No fevers/chills/shortness of breath/chest pain/new neurologic complaints.    Vital Signs Last 24 Hrs  T(C): 36.6 (23 Aug 2019 05:26), Max: 36.6 (23 Aug 2019 05:26)  T(F): 97.8 (23 Aug 2019 05:26), Max: 97.8 (23 Aug 2019 05:26)  HR: 100 (23 Aug 2019 06:00) (70 - 100)  BP: 132/71 (23 Aug 2019 06:00) (109/66 - 146/72)  BP(mean): 100 (23 Aug 2019 00:51) (84 - 106)  RR: 18 (23 Aug 2019 06:00) (11 - 21)  SpO2: 96% (23 Aug 2019 06:00) (94% - 100%)    VSS  General: Well-appearing adult Female lying supine; NAD; A&Ox3  Back: DSG CDI  Hemovac drains x1 with SS output  Motor:  LUE- Delt/Bicep/Tricep/Wrist Ext/ 5/5 strength   RUE- Delt/Bicep/Tricep/Wrist Ext/ 5/5 strength  Sensory:  LUE- SILT C5-T1 dermatomes  RUE- SILT C5-T1 dermatomes  Vascular:  Hands WWP; Radial 2+ bilaterally; Cap refill < 2 sec    I&O's Detail    22 Aug 2019 07:01  -  23 Aug 2019 07:00  --------------------------------------------------------  IN:    lactated ringers.: 1500 mL    Oral Fluid: 238 mL  Total IN: 1738 mL    OUT:    Accordian: 60 mL    Estimated Blood Loss: 100 mL    Indwelling Catheter - Urethral: 1950 mL  Total OUT: 2110 mL    Total NET: -372 mL          Labs:                        11.0   6.48  )-----------( 205      ( 23 Aug 2019 06:33 )             32.2     23 Aug 2019 06:33    141    |  104    |  9      ----------------------------<  101    4.1     |  25     |  0.79     Ca    8.6        23 Aug 2019 06:33            A/P: 63y Female s/p Lami and PCF C3-C7 8/22/19    - Stable  - Pain control as per Pain mgmt recs  - Nausea control/Bowel regiment  - Home meds  - PT   - WBAT  - Monitor drain output   - DVT ppx: SCDs    Ortho Pager [phone]
Orthopaedic Surgery Progress Note    Post-operative day #1 s/p ACDF C3-C7    Subjective:     Patient seen and examined. Patient comfortable without complaints, pain controlled.  Denies chest pain, shortness of breath, nausea/vomiting, numbness/tingling.    Objective:    Vital Signs Last 24 Hrs  T(C): 37 (08-23-19 @ 14:07), Max: 37 (08-23-19 @ 14:07)  T(F): 98.6 (08-23-19 @ 14:07), Max: 98.6 (08-23-19 @ 14:07)  HR: 86 (08-23-19 @ 14:07) (86 - 95)  BP: 122/86 (08-23-19 @ 14:07) (122/86 - 139/75)  BP(mean): --  RR: 18 (08-23-19 @ 14:07) (18 - 18)  SpO2: 96% (08-23-19 @ 14:07) (96% - 96%)  AVSS    PE:  General: Patient alert and oriented, NAD  Dressing: Clean/dry/intact prineo with HV x1  Pulses: 2+ radial pulses BUE   Sensation: SILT BUE  Motor: /biceps/triceps 5/5 BUE                          11.0   6.48  )-----------( 205      ( 23 Aug 2019 06:33 )             32.2   23 Aug 2019 06:33    141    |  104    |  9      ----------------------------<  101    4.1     |  25     |  0.79     Drain: 60 cc's in 24 hours, 50 cc's overnight    A/P: 63y Female POD#1 s/p C3-C7 ACDF    1. Pain control as needed  2. DVT prophylaxis: SCDs  3. PT/OT, weight-bearing status: WBAT  4. Dispo: Home    Ortho Pager [phone]
Pain Management Progress Note - [facility] [phone]      HPI: Patient seen and examined today, patient in nad. Patient s/p ACDF C3-C7 Pod#1. Patient reports neck and surgical site pain, pain controlled with current pain medication regimen. Patient Axox3, denies n,v, no s/s of oversedation.  Dressing c,d,i.       Pain is ___ sharp __x__dull ___burning _x__achy ___ Intensity: ____ mild _x__mod x___severe     Location _x___surgical site _x___cervical _____lumbar ____abd ____upper ext____lower ext    Worse with __x__activity _x___movement _____physical therapy___ Rest    Improved with _x___medication _x___rest ____physical therapy      chlorhexidine 2% Cloths  BUpivacaine liposome 1.3% Injectable (no eMAR)  HYDROmorphone  Injectable  HYDROmorphone PCA (1 mG/mL)  HYDROmorphone PCA (1 mG/mL) Rescue Clinician Bolus  naloxone Injectable  ondansetron Injectable  HYDROmorphone  Injectable  oxyCODONE    IR  oxyCODONE    IR  HYDROmorphone  Injectable  HYDROmorphone  Injectable  cyclobenzaprine  lactated ringers.  acetaminophen   Tablet ..  oxyCODONE    5 mG/acetaminophen 325 mG  oxyCODONE    5 mG/acetaminophen 325 mG  HYDROmorphone  Injectable  ceFAZolin   IVPB  famotidine    Tablet  docusate sodium  magnesium hydroxide Suspension  senna  multivitamin  HYDROmorphone  Injectable  metoprolol succinate ER  levothyroxine  ceFAZolin  Injectable.        ROS: Const: - ___febrile   Eyes:___ENT:___CV: _-__chest pain  Resp: __-__sob  GI:_-__nausea _-__vomiting -_abd pain ___npo ___clears _x_full diet __bm  :___ Musk: _x__pain _x__spasm  Skin:___ Neuro:  _-__ejxykvpr_-__tusxigeru_-__ numbness _-__weakness __-_paresth  Psych:_-_anxiety  Endo:___ Heme:___Allergy:_________, __x_all others reviewed and negative      PAST MEDICAL & SURGICAL HISTORY:  Cervical stenosis of spine  Surgery, elective: infertility procedures  Surgery, elective: shoulder -right x 3  Surgery, elective: left foot x 6 surgeries      08-23 @ 06:3380 mL/min/1.73M2      08-22 @ 15:5094 mL/min/1.73M2      Hemoglobin: 11.0 g/dL (08-23 @ 06:33)  Hemoglobin: 11.3 g/dL (08-22 @ 15:50)        T(C): 36.6 (08-23-19 @ 05:26), Max: 36.6 (08-23-19 @ 05:26)  HR: 95 (08-23-19 @ 10:10) (70 - 100)  BP: 139/75 (08-23-19 @ 10:10) (109/66 - 146/72)  RR: 18 (08-23-19 @ 06:00) (11 - 21)  SpO2: 96% (08-23-19 @ 06:00) (94% - 100%)  Wt(kg): --      PHYSICAL EXAM:  Gen Appearance: _x__no acute distress _x__appropriate        Neuro: __x_SILT feet____ EOM Intact Psych: AAOX_3_, _x_mood/affect appropriate        Eyes: _x__conjunctiva WNL  __x___ Pupils equal and round        ENT: __x_ears and nose atraumatic_x__ Hearing grossly intact        Neck: x___trachea midline, no visible masses ___thyroid without palpable mass    Resp: _x__Nml WOB____No tactile fremitus ___clear to auscultation    Cardio: _x__extremities free from edema __x__pedal pulses palpable    GI/Abdomen: _x__soft ___x__ Nontender___x___Nondistended_____HSM    Lymphatic: ___no palpable nodes in neck  ___no palpable nodes calves and feet    Skin/Wound: ___Incision, _x__Dressing c/d/i,   ____surrounding tissues soft,  ___drain/chest tube present____    Muscular: EHL _5__/5  Gastrocnemius_5__/5    ___absent clubbing/cyanosis        ASSESSMENT: This is a 63y old Female with a history of cervical spinal stenosis s/p ACDF C3-C7 pod#1, pain controlled with current pain medication regimen.       Recommended Treatment PLAN:  1. Oxycodone 5-10mg PO Q4h prn moderate to severe pain  2. Dilaudid 0.5mg Q2h IVP prn breakthrough pain   3. Flexeril 5mg PO Q8h prn muscle spasms  Plan discussed with Dr. Parr

## 2019-08-24 NOTE — DISCHARGE NOTE NURSING/CASE MANAGEMENT/SOCIAL WORK - NSDCDPATPORTLINK_GEN_ALL_CORE
You can access the [facility] Portal, offered by Health system, by registering with the following website: [email or web address]

## 2019-08-24 NOTE — PROGRESS NOTE ADULT - REASON FOR ADMISSION
neck pain/surgery

## 2019-08-27 PROBLEM — Z86.39 PERSONAL HISTORY OF OTHER ENDOCRINE, NUTRITIONAL AND METABOLIC DISEASE: Chronic | Status: ACTIVE | Noted: 2019-08-23

## 2019-08-27 PROBLEM — M48.02 SPINAL STENOSIS, CERVICAL REGION: Chronic | Status: ACTIVE | Noted: 2019-08-21

## 2019-08-27 PROCEDURE — 86923 COMPATIBILITY TEST ELECTRIC: CPT

## 2019-08-27 PROCEDURE — 80048 BASIC METABOLIC PNL TOTAL CA: CPT

## 2019-08-27 PROCEDURE — 86901 BLOOD TYPING SEROLOGIC RH(D): CPT

## 2019-08-27 PROCEDURE — 85025 COMPLETE CBC W/AUTO DIFF WBC: CPT

## 2019-08-27 PROCEDURE — 88304 TISSUE EXAM BY PATHOLOGIST: CPT

## 2019-08-27 PROCEDURE — 95940 IONM IN OPERATNG ROOM 15 MIN: CPT

## 2019-08-27 PROCEDURE — C1889: CPT

## 2019-08-27 PROCEDURE — 86803 HEPATITIS C AB TEST: CPT

## 2019-08-27 PROCEDURE — 86900 BLOOD TYPING SEROLOGIC ABO: CPT

## 2019-08-27 PROCEDURE — C1713: CPT

## 2019-08-27 PROCEDURE — 72040 X-RAY EXAM NECK SPINE 2-3 VW: CPT

## 2019-08-27 PROCEDURE — 36415 COLL VENOUS BLD VENIPUNCTURE: CPT

## 2019-08-27 PROCEDURE — 86850 RBC ANTIBODY SCREEN: CPT

## 2019-08-27 PROCEDURE — 97161 PT EVAL LOW COMPLEX 20 MIN: CPT

## 2019-08-27 PROCEDURE — 76000 FLUOROSCOPY <1 HR PHYS/QHP: CPT

## 2019-08-28 VITALS
SYSTOLIC BLOOD PRESSURE: 109 MMHG | OXYGEN SATURATION: 98 % | TEMPERATURE: 97 F | DIASTOLIC BLOOD PRESSURE: 57 MMHG | HEART RATE: 89 BPM | RESPIRATION RATE: 16 BRPM | WEIGHT: 103.4 LBS | HEIGHT: 60 IN

## 2019-08-28 NOTE — H&P ADULT - HISTORY OF PRESENT ILLNESS
63F s/p ACDF C3-7 c/o neck pain  Presents today for elective PSF C3-7, possible C2, ICBG. 63F s/p ACDF C3-7 8/22/19 c/o neck pain  Presents today for elective PSF C3-7, possible C2, ICBG. 63 y.o F with neck pain x chronic. Symptoms continue despite conservative treatment.    Had ACDF surgery here last week . Presents today for second stage procedure -  Presents today for elective PSF C3-7, possible C2, ICBG.  Denies radiating pain or numbness. Subjectively prior to last week surgery some intermittent weakness of UE (dropping objects) which has improved. Denies incontinence or LE  symptoms. No fever, chills , or recent illness. No drainage or bleed ing from anterior wound.  C/o of some right shoulder soreness since surgery which she believes to be positional related. No other complaints.     Right hand dominant. Independent ambulator.

## 2019-08-28 NOTE — H&P ADULT - NSHPREVIEWOFSYSTEMS_GEN_ALL_CORE
musculoskeletal: +neck pain neck pain  UE weakness neck pain  UE weakness (improving)  right shoulder pain

## 2019-08-28 NOTE — H&P ADULT - NSICDXPASTSURGICALHX_GEN_ALL_CORE_FT
PAST SURGICAL HISTORY:  S/P cervical spinal fusion ACDF C3-7    Surgery, elective left foot x 6 surgeries    Surgery, elective shoulder -right x 3    Surgery, elective infertility procedures

## 2019-08-28 NOTE — H&P ADULT - ASSESSMENT
63F with neck pain 63F with neck pain      musculoskeletal: +neck pain    63F s/p ACDF C3-7 8/22/19 c/o neck pain  Presents today for elective PSF C3-7, possible C2, ICBG.    Medically Cleared by Dr. Lo

## 2019-08-29 ENCOUNTER — INPATIENT (INPATIENT)
Facility: HOSPITAL | Age: 64
LOS: 7 days | Discharge: HOME HEALTH | DRG: 471 | End: 2019-09-06
Attending: SPECIALIST | Admitting: SPECIALIST
Payer: COMMERCIAL

## 2019-08-29 DIAGNOSIS — E03.9 HYPOTHYROIDISM, UNSPECIFIED: ICD-10-CM

## 2019-08-29 DIAGNOSIS — M40.202 UNSPECIFIED KYPHOSIS, CERVICAL REGION: ICD-10-CM

## 2019-08-29 DIAGNOSIS — R13.10 DYSPHAGIA, UNSPECIFIED: ICD-10-CM

## 2019-08-29 DIAGNOSIS — T17.928A FOOD IN RESPIRATORY TRACT, PART UNSPECIFIED CAUSING OTHER INJURY, INITIAL ENCOUNTER: ICD-10-CM

## 2019-08-29 DIAGNOSIS — E87.1 HYPO-OSMOLALITY AND HYPONATREMIA: ICD-10-CM

## 2019-08-29 DIAGNOSIS — Z41.9 ENCOUNTER FOR PROCEDURE FOR PURPOSES OTHER THAN REMEDYING HEALTH STATE, UNSPECIFIED: Chronic | ICD-10-CM

## 2019-08-29 DIAGNOSIS — M48.02 SPINAL STENOSIS, CERVICAL REGION: ICD-10-CM
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
## 2019-08-29 DIAGNOSIS — E87.6 HYPOKALEMIA: ICD-10-CM

## 2019-08-29 DIAGNOSIS — K21.9 GASTRO-ESOPHAGEAL REFLUX DISEASE WITHOUT ESOPHAGITIS: ICD-10-CM

## 2019-08-29 DIAGNOSIS — F41.9 ANXIETY DISORDER, UNSPECIFIED: ICD-10-CM
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
## 2019-08-29 DIAGNOSIS — Z98.1 ARTHRODESIS STATUS: Chronic | ICD-10-CM

## 2019-08-29 DIAGNOSIS — K22.4 DYSKINESIA OF ESOPHAGUS: ICD-10-CM

## 2019-08-29 DIAGNOSIS — Z98.1 ARTHRODESIS STATUS: ICD-10-CM

## 2019-08-29 DIAGNOSIS — Z86.39 PERSONAL HISTORY OF OTHER ENDOCRINE, NUTRITIONAL AND METABOLIC DISEASE: ICD-10-CM

## 2019-08-29 DIAGNOSIS — D62 ACUTE POSTHEMORRHAGIC ANEMIA: ICD-10-CM

## 2019-08-29 DIAGNOSIS — E43 UNSPECIFIED SEVERE PROTEIN-CALORIE MALNUTRITION: ICD-10-CM

## 2019-08-29 DIAGNOSIS — Q78.0 OSTEOGENESIS IMPERFECTA: ICD-10-CM

## 2019-08-29 DIAGNOSIS — J30.9 ALLERGIC RHINITIS, UNSPECIFIED: ICD-10-CM

## 2019-08-29 PROCEDURE — 72040 X-RAY EXAM NECK SPINE 2-3 VW: CPT | Mod: 26

## 2019-08-29 RX ORDER — HYDROMORPHONE HYDROCHLORIDE 2 MG/ML
1 INJECTION INTRAMUSCULAR; INTRAVENOUS; SUBCUTANEOUS EVERY 4 HOURS
Refills: 0 | Status: DISCONTINUED | OUTPATIENT
Start: 2019-08-29 | End: 2019-08-29

## 2019-08-29 RX ORDER — CEFAZOLIN SODIUM 1 G
2000 VIAL (EA) INJECTION EVERY 8 HOURS
Refills: 0 | Status: COMPLETED | OUTPATIENT
Start: 2019-08-29 | End: 2019-08-30

## 2019-08-29 RX ORDER — ACETAMINOPHEN 500 MG
650 TABLET ORAL EVERY 6 HOURS
Refills: 0 | Status: DISCONTINUED | OUTPATIENT
Start: 2019-08-29 | End: 2019-09-03

## 2019-08-29 RX ORDER — MORPHINE SULFATE 50 MG/1
1 CAPSULE, EXTENDED RELEASE ORAL
Refills: 0 | Status: DISCONTINUED | OUTPATIENT
Start: 2019-08-29 | End: 2019-08-29

## 2019-08-29 RX ORDER — METOPROLOL TARTRATE 50 MG
200 TABLET ORAL DAILY
Refills: 0 | Status: DISCONTINUED | OUTPATIENT
Start: 2019-08-29 | End: 2019-09-06

## 2019-08-29 RX ORDER — HYDROMORPHONE HYDROCHLORIDE 2 MG/ML
4 INJECTION INTRAMUSCULAR; INTRAVENOUS; SUBCUTANEOUS EVERY 6 HOURS
Refills: 0 | Status: DISCONTINUED | OUTPATIENT
Start: 2019-08-29 | End: 2019-08-29

## 2019-08-29 RX ORDER — BUPIVACAINE 13.3 MG/ML
20 INJECTION, SUSPENSION, LIPOSOMAL INFILTRATION ONCE
Refills: 0 | Status: DISCONTINUED | OUTPATIENT
Start: 2019-08-29 | End: 2019-09-06

## 2019-08-29 RX ORDER — SENNA PLUS 8.6 MG/1
2 TABLET ORAL AT BEDTIME
Refills: 0 | Status: DISCONTINUED | OUTPATIENT
Start: 2019-08-29 | End: 2019-09-06

## 2019-08-29 RX ORDER — DIAZEPAM 5 MG
5 TABLET ORAL EVERY 12 HOURS
Refills: 0 | Status: DISCONTINUED | OUTPATIENT
Start: 2019-08-29 | End: 2019-09-03

## 2019-08-29 RX ORDER — TRAMADOL HYDROCHLORIDE 50 MG/1
50 TABLET ORAL EVERY 6 HOURS
Refills: 0 | Status: DISCONTINUED | OUTPATIENT
Start: 2019-08-29 | End: 2019-08-31

## 2019-08-29 RX ORDER — CEFAZOLIN SODIUM 1 G
2000 VIAL (EA) INJECTION EVERY 8 HOURS
Refills: 0 | Status: DISCONTINUED | OUTPATIENT
Start: 2019-08-29 | End: 2019-08-29

## 2019-08-29 RX ORDER — MORPHINE SULFATE 50 MG/1
2 CAPSULE, EXTENDED RELEASE ORAL
Refills: 0 | Status: DISCONTINUED | OUTPATIENT
Start: 2019-08-29 | End: 2019-09-05

## 2019-08-29 RX ORDER — DOCUSATE SODIUM 100 MG
100 CAPSULE ORAL THREE TIMES A DAY
Refills: 0 | Status: DISCONTINUED | OUTPATIENT
Start: 2019-08-29 | End: 2019-09-06

## 2019-08-29 RX ORDER — TRAMADOL HYDROCHLORIDE 50 MG/1
25 TABLET ORAL EVERY 6 HOURS
Refills: 0 | Status: DISCONTINUED | OUTPATIENT
Start: 2019-08-29 | End: 2019-08-31

## 2019-08-29 RX ORDER — SODIUM CHLORIDE 9 MG/ML
1000 INJECTION, SOLUTION INTRAVENOUS
Refills: 0 | Status: DISCONTINUED | OUTPATIENT
Start: 2019-08-29 | End: 2019-09-01

## 2019-08-29 RX ORDER — LEVOTHYROXINE SODIUM 125 MCG
75 TABLET ORAL DAILY
Refills: 0 | Status: DISCONTINUED | OUTPATIENT
Start: 2019-08-29 | End: 2019-09-06

## 2019-08-29 RX ADMIN — HYDROMORPHONE HYDROCHLORIDE 1 MILLIGRAM(S): 2 INJECTION INTRAMUSCULAR; INTRAVENOUS; SUBCUTANEOUS at 14:10

## 2019-08-29 RX ADMIN — Medication 100 MILLIGRAM(S): at 22:39

## 2019-08-29 RX ADMIN — Medication 2000 MILLIGRAM(S): at 22:38

## 2019-08-29 RX ADMIN — HYDROMORPHONE HYDROCHLORIDE 1 MILLIGRAM(S): 2 INJECTION INTRAMUSCULAR; INTRAVENOUS; SUBCUTANEOUS at 14:25

## 2019-08-29 RX ADMIN — Medication 0.5 MILLIGRAM(S): at 14:48

## 2019-08-29 RX ADMIN — SENNA PLUS 2 TABLET(S): 8.6 TABLET ORAL at 22:39

## 2019-08-29 RX ADMIN — TRAMADOL HYDROCHLORIDE 25 MILLIGRAM(S): 50 TABLET ORAL at 22:46

## 2019-08-29 RX ADMIN — TRAMADOL HYDROCHLORIDE 25 MILLIGRAM(S): 50 TABLET ORAL at 23:46

## 2019-08-29 NOTE — CONSULT NOTE ADULT - SUBJECTIVE AND OBJECTIVE BOX
Pain Management Consult Note - [facility] [phone]    Chief Complaint: Neck Pain    HPI: Patient seen and examined today, patient in nad. Patient with a history of hypothyroidism,  and cervical stenosis of spine, s/p PSF C3-C7. Patient reports neck and surgical site pain. Patient takes Tramadol 50mg PO Q6h at home, prescribed by Blu Lo MD. Patient reports nausea with Dilaudid PO. Reviewed post op pain medication regimen.       Pain is ___ sharp ___x_dull ___burning _x__achy ___ Intensity: ____ mild _x__mod _x__severe     Location _x___surgical site ____cervical _____lumbar ____abd ____upper ext____lower ext    Worse with __x__activity __x__movement _____physical therapy___ Rest    Improved with _x___medication __x__rest ____physical therapy      ROS: Const:  __-_febrile   Eyes:___ENT:___CV: _-__chest pain  Resp: __-__sob  GI:_-__nausea _-__vomiting _-__abd pain _x__npo ___clears __full diet __bm  :___ Musk: __x_pain _x__spasm  Skin:___ Neuro:  __-_xefujqdf_-__ybqhevses__-_ numbness _-__weakness _x__paresth  Psych:__anxiety  Endo:___ Heme:___Allergy:_________, __x_all others reviewed and negative      PAST MEDICAL & SURGICAL HISTORY:  H/O: hypothyroidism  Cervical stenosis of spine  S/P cervical spinal fusion: ACDF C3-7  Surgery, elective: infertility procedures  Surgery, elective: shoulder -right x 3  Surgery, elective: left foot x 6 surgeries      SH: __-_Tobacco   _-__Alcohol                          FH:FAMILY HISTORY:      acetaminophen   Tablet .. 650 milliGRAM(s) Oral every 6 hours PRN  BUpivacaine liposome 1.3% Injectable (no eMAR) 20 milliLiter(s) Local Injection once  ceFAZolin  Injectable. 2000 milliGRAM(s) IV Push every 8 hours  diazepam    Tablet 5 milliGRAM(s) Oral every 12 hours PRN  docusate sodium 100 milliGRAM(s) Oral three times a day  HYDROmorphone   Tablet 4 milliGRAM(s) Oral every 6 hours PRN  HYDROmorphone  Injectable 1 milliGRAM(s) IV Push every 4 hours PRN  lactated ringers. 1000 milliLiter(s) IV Continuous <Continuous>  levothyroxine 75 MICROGram(s) Oral daily  LORazepam   Injectable 0.5 milliGRAM(s) IV Push once PRN  metoprolol succinate  milliGRAM(s) Oral daily  senna 2 Tablet(s) Oral at bedtime      T(C): 36.6 (08-29-19 @ 14:02), Max: 36.6 (08-29-19 @ 14:02)  HR: 84 (08-29-19 @ 16:17) (76 - 90)  BP: 113/61 (08-29-19 @ 16:17) (113/61 - 155/95)  RR: 12 (08-29-19 @ 16:17) (12 - 18)  SpO2: 100% (08-29-19 @ 16:17) (97% - 100%)  Wt(kg): --    T(C): 36.6 (08-29-19 @ 14:02), Max: 36.6 (08-29-19 @ 14:02)  HR: 84 (08-29-19 @ 16:17) (76 - 90)  BP: 113/61 (08-29-19 @ 16:17) (113/61 - 155/95)  RR: 12 (08-29-19 @ 16:17) (12 - 18)  SpO2: 100% (08-29-19 @ 16:17) (97% - 100%)  Wt(kg): --    T(C): 36.6 (08-29-19 @ 14:02), Max: 36.6 (08-29-19 @ 14:02)  HR: 84 (08-29-19 @ 16:17) (76 - 90)  BP: 113/61 (08-29-19 @ 16:17) (113/61 - 155/95)  RR: 12 (08-29-19 @ 16:17) (12 - 18)  SpO2: 100% (08-29-19 @ 16:17) (97% - 100%)  Wt(kg): --      PHYSICAL EXAM:  Gen Appearance: x___no acute distress _x__appropriate        Neuro: _x__SILT feet____ EOM Intact Psych: AAOX_3_, x___mood/affect appropriate        Eyes: _x__conjunctiva WNL  ___x__ Pupils equal and round        ENT: x___ears and nose atraumatic__x_ Hearing grossly intact        Neck: _x__trachea midline, no visible masses ___thyroid without palpable mass    Resp: _x__Nml WOB____No tactile fremitus ___clear to auscultation    Cardio: __x_extremities free from edema __x__pedal pulses palpable    GI/Abdomen: _x__soft __x___ Nontender___x___Nondistended_____HSM    Lymphatic: ___no palpable nodes in neck  ___no palpable nodes calves and feet    Skin/Wound: ___Incision, ___Dressing c/d/i,   ____surrounding tissues soft,  ___drain/chest tube present____    Muscular: EHL _5__/5  Gastrocnemius_5__/5    ___absent clubbing/cyanosis        ASSESSMENT: This is a 63y old Female with a history of hypothyroidism,  and cervical stenosis of spine, s/p PSF C3-C7.      Recommended Treatment PLAN:  1. Dilaudid 2-4mg PO Q4h prn moderate to severe pain  2. Dilaudid 0.5mg Q2h IVP prn breakthrough pain  3. Flexeril 5mg Po Q8h prn muscle spasms  4. Scopolamine patch   Plan discussed with Dr. Parr Pain Management Consult Note - [facility] [phone]    Chief Complaint: Neck Pain    HPI: Patient seen and examined today, patient in nad. Patient with a history of hypothyroidism,  and cervical stenosis of spine, s/p PSF C3-C7. Patient reports neck and surgical site pain. Patient takes Tramadol 50mg PO Q6h at home and clonazepam 0.5mg BID, prescribed by Blu Lo MD, patient reports pain relief with Tramadol Po.  Reviewed post op pain medication regimen with patient.        Pain is ___ sharp ___x_dull ___burning _x__achy ___ Intensity: ____ mild _x__mod _x__severe     Location _x___surgical site ____cervical _____lumbar ____abd ____upper ext____lower ext    Worse with __x__activity __x__movement _____physical therapy___ Rest    Improved with _x___medication __x__rest ____physical therapy      ROS: Const:  __-_febrile   Eyes:___ENT:___CV: _-__chest pain  Resp: __-__sob  GI:_-__nausea _-__vomiting _-__abd pain _x__npo ___clears __full diet __bm  :___ Musk: __x_pain _x__spasm  Skin:___ Neuro:  __-_ubidlcwb_-__sxsorvjmo__-_ numbness _-__weakness _x__paresth  Psych:__anxiety  Endo:___ Heme:___Allergy:_________, __x_all others reviewed and negative      PAST MEDICAL & SURGICAL HISTORY:  H/O: hypothyroidism  Cervical stenosis of spine  S/P cervical spinal fusion: ACDF C3-7  Surgery, elective: infertility procedures  Surgery, elective: shoulder -right x 3  Surgery, elective: left foot x 6 surgeries      SH: __-_Tobacco   _-__Alcohol                          FH:FAMILY HISTORY:      acetaminophen   Tablet .. 650 milliGRAM(s) Oral every 6 hours PRN  BUpivacaine liposome 1.3% Injectable (no eMAR) 20 milliLiter(s) Local Injection once  ceFAZolin  Injectable. 2000 milliGRAM(s) IV Push every 8 hours  diazepam    Tablet 5 milliGRAM(s) Oral every 12 hours PRN  docusate sodium 100 milliGRAM(s) Oral three times a day  HYDROmorphone   Tablet 4 milliGRAM(s) Oral every 6 hours PRN  HYDROmorphone  Injectable 1 milliGRAM(s) IV Push every 4 hours PRN  lactated ringers. 1000 milliLiter(s) IV Continuous <Continuous>  levothyroxine 75 MICROGram(s) Oral daily  LORazepam   Injectable 0.5 milliGRAM(s) IV Push once PRN  metoprolol succinate  milliGRAM(s) Oral daily  senna 2 Tablet(s) Oral at bedtime      T(C): 36.6 (08-29-19 @ 14:02), Max: 36.6 (08-29-19 @ 14:02)  HR: 84 (08-29-19 @ 16:17) (76 - 90)  BP: 113/61 (08-29-19 @ 16:17) (113/61 - 155/95)  RR: 12 (08-29-19 @ 16:17) (12 - 18)  SpO2: 100% (08-29-19 @ 16:17) (97% - 100%)  Wt(kg): --    T(C): 36.6 (08-29-19 @ 14:02), Max: 36.6 (08-29-19 @ 14:02)  HR: 84 (08-29-19 @ 16:17) (76 - 90)  BP: 113/61 (08-29-19 @ 16:17) (113/61 - 155/95)  RR: 12 (08-29-19 @ 16:17) (12 - 18)  SpO2: 100% (08-29-19 @ 16:17) (97% - 100%)  Wt(kg): --    T(C): 36.6 (08-29-19 @ 14:02), Max: 36.6 (08-29-19 @ 14:02)  HR: 84 (08-29-19 @ 16:17) (76 - 90)  BP: 113/61 (08-29-19 @ 16:17) (113/61 - 155/95)  RR: 12 (08-29-19 @ 16:17) (12 - 18)  SpO2: 100% (08-29-19 @ 16:17) (97% - 100%)  Wt(kg): --      PHYSICAL EXAM:  Gen Appearance: x___no acute distress _x__appropriate        Neuro: _x__SILT feet____ EOM Intact Psych: AAOX_3_, x___mood/affect appropriate        Eyes: _x__conjunctiva WNL  ___x__ Pupils equal and round        ENT: x___ears and nose atraumatic__x_ Hearing grossly intact        Neck: _x__trachea midline, no visible masses ___thyroid without palpable mass    Resp: _x__Nml WOB____No tactile fremitus ___clear to auscultation    Cardio: __x_extremities free from edema __x__pedal pulses palpable    GI/Abdomen: _x__soft __x___ Nontender___x___Nondistended_____HSM    Lymphatic: ___no palpable nodes in neck  ___no palpable nodes calves and feet    Skin/Wound: ___Incision, ___Dressing c/d/i,   ____surrounding tissues soft,  ___drain/chest tube present____    Muscular: EHL _5__/5  Gastrocnemius_5__/5    ___absent clubbing/cyanosis        ASSESSMENT: This is a 63y old Female with a history of hypothyroidism,  and cervical stenosis of spine, s/p PSF C3-C7.      Recommended Treatment PLAN:  1. Dilaudid 2-4mg PO Q4h prn moderate to severe pain  2. Dilaudid 0.5mg Q2h IVP prn breakthrough pain  3. Flexeril 5mg Po Q8h prn muscle spasms  4. Scopolamine patch   Plan discussed with Dr. Parr

## 2019-08-29 NOTE — PROGRESS NOTE ADULT - SUBJECTIVE AND OBJECTIVE BOX
Anesthesia Brief Note    Ortho team requesting anesthesia evaluation for post PACU bed assignment.  Seen at bedside with Dr. Flores to evaluate.  Sleeping, easily arousable, participates in conversation, able to raise right upper extremity above head, weak on left arm; bilateral LEs weak, wiggles toes.  S/p exam with ortho PA - no requests/concerns rendered to anesthesia team.  Deemed appropriate for regional bed by anesthesia.  D/W primary RNs.

## 2019-08-29 NOTE — PROGRESS NOTE ADULT - SUBJECTIVE AND OBJECTIVE BOX
S: Was called to the bedside by RAKAN Pickard who was assessing the patient at the bedside. While approaching the bed, the patient seems anxious, asking "how fast is my breathing." She is tense, arms, legs, fists and toes clenched tight. She states "I cant release my " and notes that she feels decreased sensation throughout her arms and legs diffusely. Patient tells me she has a history of panic attacks (at least 3 she can recall) and takes metoprolol and Klonopin for this. She also has generalized anxiety disorder. She denies history of seizure. She denies states her incision hurts however denies radiating pain.     O:  Alert and oriented to person, place and time  Cranial nerves:  II: Patient is able to identify myself by name, remembers me from pre-op, denies blurry vision  III/IV/VI: EOM intact, pupils equal and reactive  V: facial sensation equal and intact  VII: Patient able to smile and wrinkle forehead symmetrically  VIII: Patient responding to questions appropriately  IX/X/XII: Able to say "ahh,"  able to move tongue on command    Motors: Patient tense, resists passive range of motion of all extremities, 5/5 strength to the b/l delts, biceps, triceps, , sensation diminished diffusely, however intact throughout the b/l upper and lower extremities. Pulses palpable to radial and DP pulses equally. Wound is well appearing.     A: Patient is s/p C3-7 posterior cervical fusion. I was called to the bedside for second evaluation by Melly BLEDSOE. Patient appears to have been very anxious and in incisional pain post-op, prior to receiving adequate pain medication. She has a history of AURA and at least 3 panic attacks. She received ativan at the bedside and calmed down. Continues to deny radiating pain, arms and legs are more relaxed, sensation intact. Improving.     P:  Ativan given per floor team  Instructed patient to notify nurse of any new symptoms, currently states she is improved  Regular neuro-checks  HOB at 60 degrees  hard collar at all times  2 drain sites 1 drain  Baig out when ambulating  Labs and XR in AM  Discussed with Melly BLEDSOE who agrees this appears to be anxiety  Discussed with Anesthesia NP Yumiko who alerted anesthesia team for eval  Discussed with  who has notified , medicine attending who will be following the patient    Vazquez Sheikh PA-C

## 2019-08-29 NOTE — PROGRESS NOTE ADULT - SUBJECTIVE AND OBJECTIVE BOX
Ortho Post Op Check    Procedure: Posterior cervical fusion C3-C7  Surgeon: Dr. Ricardo    Pt is complaining of numbness in both arms and legs, inability to move her upper and lower extremities, and inability to unclench her fists. She is concerned that she is breathing fast. She denies any chest pain, shortness of breath, nausea/vomiting, abdominal pain.     Vital Signs Last 24 Hrs  T(C): 36.6 (08-29-19 @ 14:02), Max: 36.6 (08-29-19 @ 14:02)  T(F): 97.8 (08-29-19 @ 14:02), Max: 97.8 (08-29-19 @ 14:02)  HR: 82 (08-29-19 @ 14:47) (82 - 90)  BP: 148/78 (08-29-19 @ 14:47) (142/87 - 155/95)  BP(mean): 106 (08-29-19 @ 14:32) (106 - 116)  RR: 14 (08-29-19 @ 14:47) (12 - 14)  SpO2: 100% (08-29-19 @ 14:47) (100% - 100%)  AVSS    General: Pt Alert and oriented, tearful, anxious   DSG C/D/I, C-collar in place  Pulses: 2+ radial pulse BUE   Sensation: SILT BUE and BLE   Motor: Full passive ROM of joints of BUE and BLE  Patient clenching her fists, but will open slightly to check .  strength weak bilaterally.   Patient unable to actively raise her arms to check biceps/triceps    A/P: 63yFemale POD#0 s/p C3-C7 posterior cervical fusion    Patient having subjective numbness and weakness in her BUE and BLE in PACU. Seen with Dr. Ricardo's RAKAN Shukla at bedside. Pt has a h/o anxiety and takes Klonopin 1mg QHS PRN anxiety. She has a h/o panic attacks. Her symptoms are not consistent with her exam and not consistent with a severed nerve root or other complication of the above procedure. Pt given 0.5mg IV Ativan with improvement in anxiety and tension in BUE/hands. Pt continues to state that she is having numbness, but then says that she can feel everything normally when examined. She states she cannot move her hands, but when distracted and talking, she is using her hands like normal. Dr. Ricardo aware. Will keep a close eye on the patient overnight and during her stay.   - Stable  - Pain Control  - DVT ppx: SCDs  - Post op abx: Ancef  - HOB at 60 degrees at all times  - Cervical collar at all times  - Xray tomorrow  - PT, WBS: WBAT    Ortho Pager [phone]

## 2019-08-30 LAB
ANION GAP SERPL CALC-SCNC: 8 MMOL/L — SIGNIFICANT CHANGE UP (ref 5–17)
BASOPHILS # BLD AUTO: 0.01 K/UL — SIGNIFICANT CHANGE UP (ref 0–0.2)
BASOPHILS NFR BLD AUTO: 0.1 % — SIGNIFICANT CHANGE UP (ref 0–2)
BUN SERPL-MCNC: 7 MG/DL — SIGNIFICANT CHANGE UP (ref 7–23)
CALCIUM SERPL-MCNC: 8.8 MG/DL — SIGNIFICANT CHANGE UP (ref 8.4–10.5)
CHLORIDE SERPL-SCNC: 102 MMOL/L — SIGNIFICANT CHANGE UP (ref 96–108)
CO2 SERPL-SCNC: 28 MMOL/L — SIGNIFICANT CHANGE UP (ref 22–31)
CREAT SERPL-MCNC: 0.61 MG/DL — SIGNIFICANT CHANGE UP (ref 0.5–1.3)
EOSINOPHIL # BLD AUTO: 0.01 K/UL — SIGNIFICANT CHANGE UP (ref 0–0.5)
EOSINOPHIL NFR BLD AUTO: 0.1 % — SIGNIFICANT CHANGE UP (ref 0–6)
GLUCOSE SERPL-MCNC: 95 MG/DL — SIGNIFICANT CHANGE UP (ref 70–99)
HCT VFR BLD CALC: 28.4 % — LOW (ref 34.5–45)
HGB BLD-MCNC: 9.6 G/DL — LOW (ref 11.5–15.5)
IMM GRANULOCYTES NFR BLD AUTO: 0.6 % — SIGNIFICANT CHANGE UP (ref 0–1.5)
LYMPHOCYTES # BLD AUTO: 0.69 K/UL — LOW (ref 1–3.3)
LYMPHOCYTES # BLD AUTO: 6.9 % — LOW (ref 13–44)
MCHC RBC-ENTMCNC: 32.3 PG — SIGNIFICANT CHANGE UP (ref 27–34)
MCHC RBC-ENTMCNC: 33.8 GM/DL — SIGNIFICANT CHANGE UP (ref 32–36)
MCV RBC AUTO: 95.6 FL — SIGNIFICANT CHANGE UP (ref 80–100)
MONOCYTES # BLD AUTO: 0.86 K/UL — SIGNIFICANT CHANGE UP (ref 0–0.9)
MONOCYTES NFR BLD AUTO: 8.6 % — SIGNIFICANT CHANGE UP (ref 2–14)
NEUTROPHILS # BLD AUTO: 8.39 K/UL — HIGH (ref 1.8–7.4)
NEUTROPHILS NFR BLD AUTO: 83.7 % — HIGH (ref 43–77)
NRBC # BLD: 0 /100 WBCS — SIGNIFICANT CHANGE UP (ref 0–0)
PLATELET # BLD AUTO: 277 K/UL — SIGNIFICANT CHANGE UP (ref 150–400)
POTASSIUM SERPL-MCNC: 4.1 MMOL/L — SIGNIFICANT CHANGE UP (ref 3.5–5.3)
POTASSIUM SERPL-SCNC: 4.1 MMOL/L — SIGNIFICANT CHANGE UP (ref 3.5–5.3)
RBC # BLD: 2.97 M/UL — LOW (ref 3.8–5.2)
RBC # FLD: 12.9 % — SIGNIFICANT CHANGE UP (ref 10.3–14.5)
SODIUM SERPL-SCNC: 138 MMOL/L — SIGNIFICANT CHANGE UP (ref 135–145)
WBC # BLD: 10.02 K/UL — SIGNIFICANT CHANGE UP (ref 3.8–10.5)
WBC # FLD AUTO: 10.02 K/UL — SIGNIFICANT CHANGE UP (ref 3.8–10.5)

## 2019-08-30 PROCEDURE — 72040 X-RAY EXAM NECK SPINE 2-3 VW: CPT | Mod: 26

## 2019-08-30 RX ORDER — CYCLOBENZAPRINE HYDROCHLORIDE 10 MG/1
5 TABLET, FILM COATED ORAL THREE TIMES A DAY
Refills: 0 | Status: DISCONTINUED | OUTPATIENT
Start: 2019-08-30 | End: 2019-09-03

## 2019-08-30 RX ORDER — CLONAZEPAM 1 MG
0.5 TABLET ORAL
Refills: 0 | Status: COMPLETED | OUTPATIENT
Start: 2019-08-30 | End: 2019-09-06

## 2019-08-30 RX ADMIN — MORPHINE SULFATE 2 MILLIGRAM(S): 50 CAPSULE, EXTENDED RELEASE ORAL at 13:57

## 2019-08-30 RX ADMIN — TRAMADOL HYDROCHLORIDE 50 MILLIGRAM(S): 50 TABLET ORAL at 19:26

## 2019-08-30 RX ADMIN — Medication 650 MILLIGRAM(S): at 20:34

## 2019-08-30 RX ADMIN — Medication 100 MILLIGRAM(S): at 20:34

## 2019-08-30 RX ADMIN — Medication 200 MILLIGRAM(S): at 06:03

## 2019-08-30 RX ADMIN — Medication 75 MICROGRAM(S): at 06:03

## 2019-08-30 RX ADMIN — MORPHINE SULFATE 2 MILLIGRAM(S): 50 CAPSULE, EXTENDED RELEASE ORAL at 13:42

## 2019-08-30 RX ADMIN — TRAMADOL HYDROCHLORIDE 50 MILLIGRAM(S): 50 TABLET ORAL at 10:45

## 2019-08-30 RX ADMIN — Medication 650 MILLIGRAM(S): at 21:34

## 2019-08-30 RX ADMIN — SENNA PLUS 2 TABLET(S): 8.6 TABLET ORAL at 20:35

## 2019-08-30 RX ADMIN — TRAMADOL HYDROCHLORIDE 50 MILLIGRAM(S): 50 TABLET ORAL at 11:15

## 2019-08-30 RX ADMIN — TRAMADOL HYDROCHLORIDE 25 MILLIGRAM(S): 50 TABLET ORAL at 04:46

## 2019-08-30 RX ADMIN — Medication 2000 MILLIGRAM(S): at 06:03

## 2019-08-30 RX ADMIN — MORPHINE SULFATE 2 MILLIGRAM(S): 50 CAPSULE, EXTENDED RELEASE ORAL at 17:49

## 2019-08-30 RX ADMIN — Medication 0.5 MILLIGRAM(S): at 21:56

## 2019-08-30 RX ADMIN — TRAMADOL HYDROCHLORIDE 25 MILLIGRAM(S): 50 TABLET ORAL at 05:46

## 2019-08-30 RX ADMIN — Medication 5 MILLIGRAM(S): at 17:49

## 2019-08-30 RX ADMIN — Medication 100 MILLIGRAM(S): at 06:03

## 2019-08-30 RX ADMIN — CYCLOBENZAPRINE HYDROCHLORIDE 5 MILLIGRAM(S): 10 TABLET, FILM COATED ORAL at 22:53

## 2019-08-30 NOTE — DISCHARGE NOTE PROVIDER - CARE PROVIDERS DIRECT ADDRESSES
[email or web address]

## 2019-08-30 NOTE — PROGRESS NOTE ADULT - SUBJECTIVE AND OBJECTIVE BOX
Orthopaedic Spine Resident Note    S:  No acute overnight events.  Pain well controlled.  Patient complained of weakness in BUE yesterday, anxiety related, resolved with redirection  No fevers/chills/shortness of breath/chest pain/new neurologic complaints.    Vital Signs Last 24 Hrs  T(C): 36.7 (30 Aug 2019 08:27), Max: 36.8 (30 Aug 2019 04:37)  T(F): 98 (30 Aug 2019 08:27), Max: 98.3 (30 Aug 2019 04:37)  HR: 76 (30 Aug 2019 08:27) (76 - 98)  BP: 144/83 (30 Aug 2019 08:27) (113/61 - 155/95)  BP(mean): 100 (29 Aug 2019 16:50) (82 - 116)  RR: 16 (30 Aug 2019 08:27) (12 - 18)  SpO2: 100% (30 Aug 2019 08:27) (94% - 100%)    VSS  General: Well-appearing adult Female lying supine; NAD; A&Ox3  Neck: DSG CDI, collar in place  Hemovac drains x  1  Motor:  LUE- Delt/Bicep/Tricep/Wrist Ext/ 5/5 strength   RUE- Delt/Bicep/Tricep/Wrist Ext/ 5/5 strength  Sensory:  LUE- SILT C5-T1 dermatomes  RUE- SILT C5-T1 dermatomes  Vascular:  Hands WWP; Radial 2+ bilaterally; Cap refill < 2 sec    I&O's Detail    29 Aug 2019 07:01  -  30 Aug 2019 07:00  --------------------------------------------------------  IN:    lactated ringers.: 1200 mL  Total IN: 1200 mL    OUT:    Accordian: 110 mL    Indwelling Catheter - Urethral: 1655 mL  Total OUT: 1765 mL    Total NET: -565 mL          Labs:                        9.6    10.02 )-----------( 277      ( 30 Aug 2019 07:24 )             28.4     30 Aug 2019 07:24    138    |  102    |  7      ----------------------------<  95     4.1     |  28     |  0.61     Ca    8.8        30 Aug 2019 07:24            A/P: 63y Female ACDF C3-C7 8/23, C3-C7 posterior cervical fusion 8/29     - Stable  - Cervical collar on at all times   - Pain control as per Pain mgmt recs  - Nausea control/Bowel regiment  - Home meds  - PT  - WBAT  - Monitor drain output   - DVT ppx: SCDs    Ortho Pager [phone]

## 2019-08-30 NOTE — BRIEF OPERATIVE NOTE - NSICDXBRIEFPROCEDURE_GEN_ALL_CORE_FT
PROCEDURES:  Application, halo vest 30-Aug-2019 16:40:03  Adin Torres
PROCEDURES:  Fusion of cervical spine at 4 levels 29-Aug-2019 14:28:12  Vazquez Sheikh

## 2019-08-30 NOTE — DISCHARGE NOTE PROVIDER - INSTRUCTIONS
as tolerated Moist/mechanical soft and thin liquids.  allow for swallow between intakes; alternate food with liquid; maintain upright posture during/after eating for 30 mins; position upright (90 degrees); small sips/bites.

## 2019-08-30 NOTE — DISCHARGE NOTE PROVIDER - NSDCFUADDINST_GEN_ALL_CORE_FT
Ambulate as tolerated with HALO   Pin Site Care to HALO pins daily with betadine and bacitracin  Keep Cervical wound dressing clean and dry.    No bending, no twisting, no driving, no strenuous activity, no lifting over 5 pounds, no bathing, only showers until cleared by doctor.  Follow up with surgeon 10-14 days after surgery. Call to make an appointment.  Notify surgeon if fever/temp greater than 101 *F,  severe pain, new numbness/weakness of extremities. Ambulate as tolerated with HALO vest.  Pin Site Care to HALO pins daily with betadine and bacitracin  Keep Cervical incisions clean and dry.  Rec sponge bathing or handheld shower from chest down.  No bending, no twisting, no driving, no strenuous activity, no lifting over 5 pounds, no bathing, only showers until cleared by doctor.  Follow up with surgeon, call to make an appointment.  Notify surgeon if fever/temp greater than 101 *F,  severe pain, new numbness/weakness of extremities. Ambulate as tolerated with HALO vest.  Pin Site Care to HALO pins daily with betadine and bacitracin and gauze around pin sites  Keep Cervical incisions clean and dry.  Rec sponge bathing or handheld shower from chest down.  No bending, no twisting, no driving, no strenuous activity, no lifting over 5 pounds, no bathing, only showers until cleared by doctor.  Follow up with surgeon, call to make an appointment.  Notify surgeon if fever/temp greater than 101 *F,  severe pain, new numbness/weakness of extremities.  Follow up with your primary care doctor Ambulate as tolerated with HALO vest in place at all times.  Pin Site Care to HALO pins daily with betadine and bacitracin and gauze around pin sites.  Keep Cervical incisions clean and dry.  Recommend sponge bathing or handheld shower from below the chest.  May use wipes to bath.  No bending, no twisting, no driving, no strenuous activity, no lifting over 5 pounds, no bathing, only showers until cleared by doctor.  Try to have regular bowel movements, take stool softener or laxative if necessary.  Call to schedule an appt with Dr. Saenz for follow up, if you have staples or sutures they will be removed in office.  Take pain medications as directed.   May take for medrol dose pack for difficulty swallowing if needed.   Notify surgeon if fever/temp greater than 101 *F,  severe pain, new numbness/weakness of extremities.  Follow up with your primary care doctor Ambulate as tolerated with HALO vest in place at all times.  Pin Site Care to HALO pins daily with hydrogen peroxide and bacitracin and gauze around pin sites.  Keep Cervical incisions clean and dry.  Recommend sponge bathing or handheld shower from below the chest.  May use wipes to bath.  No bending, no twisting, no driving, no strenuous activity, no lifting over 5 pounds, no bathing, only showers until cleared by doctor.  Try to have regular bowel movements, take stool softener or laxative if necessary.  Call to schedule an appt with Dr. Saenz for follow up, if you have staples or sutures they will be removed in office.  Take pain medications as directed.   May take for medrol dose pack for difficulty swallowing if needed.   Notify surgeon if fever/temp greater than 101 *F,  severe pain, new numbness/weakness of extremities.  Follow up with your primary care doctor

## 2019-08-30 NOTE — DISCHARGE NOTE PROVIDER - NSDCCPTREATMENT_GEN_ALL_CORE_FT
PRINCIPAL PROCEDURE  Procedure: Fusion of cervical spine at 4 levels  Findings and Treatment: PRINCIPAL PROCEDURE  Procedure: Fusion of cervical spine at 4 levels  Findings and Treatment: C3-7

## 2019-08-30 NOTE — DISCHARGE NOTE PROVIDER - PROVIDER TOKENS
PROVIDER:[TOKEN:[[id number]]] PROVIDER:[TOKEN:[[id number]],FOLLOWUP:[2 weeks]] PROVIDER:[TOKEN:[[id number]],FOLLOWUP:[1 week]],PROVIDER:[TOKEN:[[id number]]]

## 2019-08-30 NOTE — DISCHARGE NOTE PROVIDER - HOSPITAL COURSE
Admitted    Surgery: s/p ACDF C3-C7 1 week ago, C3-C7 posterior cervical fusion 8/29 and ******    Radha-op Antibiotics    Pain control    DVT prophylaxis    OOB/Physical Therapy Admitted    Surgery: s/p ACDF C3-C7 1 week ago, C3-C7 posterior cervical fusion 8/29, HALO application 8/30/19    Radha-op Antibiotics    Pain control    DVT prophylaxis - SCDs    OOB/Physical Therapy    Surgical drain monitored post op    IVF    Pin Site Care Admitted    Surgery: s/p ACDF C3-C7 8/22/19, C3-C7 posterior cervical fusion 8/29, HALO application 8/30/19    Radha-op Antibiotics    Pain control    DVT prophylaxis - SCDs    OOB/Physical Therapy    Surgical drain monitored post op    IVF    Pin Site Care Admitted    Surgery: s/p ACDF C3-C7 8/22/19, C3-C7 posterior cervical fusion 8/29, HALO application 8/30/19    Radha-op Antibiotics    Pain control    DVT prophylaxis - SCDs    OOB/Physical Therapy    Surgical drain monitored post op    IVF    Pin Site Care    Speech and swallow consult    Medicine Consult    ENT Consult    GI Consult    Barium swallow study no acute findings likely suffering from chronic esophageal dysmotility Admitted    Surgery: s/p ACDF C3-C7 8/22/19, C3-C7 posterior cervical fusion 8/29, HALO application 8/30/19    Radha-op Antibiotics    Pain control    DVT prophylaxis - SCDs    OOB/Physical Therapy    Surgical drain monitored post op    IVF    Pin Site Care    Speech and swallow consult    Medicine Consult    ENT Consult    GI Consult    Pulmonology Consult    Barium swallow study no acute findings likely suffering from chronic esophageal dysmotility Admitted    Surgery: s/p ACDF C3-C7 8/22/19, C3-C7 posterior cervical fusion 8/29, HALO application 8/30/19    Radha-op Antibiotics    Pain control    DVT prophylaxis - SCDs    OOB/Physical Therapy    Surgical drain monitored post op    IVF    Pin Site Care     Speech and swallow consult    Medicine Consult for comanagement    ENT Consult for right ear pain    GI Consult for dysphagia    Pulmonology Consult    Barium swallow study no acute findings likely suffering from chronic esophageal dysmotility

## 2019-08-30 NOTE — DISCHARGE NOTE PROVIDER - CARE PROVIDER_API CALL
Parish Ricardo)  Orthopaedic Surgery  [facility], [place] [address]  Phone: [phone]  Fax: [phone]  Follow Up Time: Parish Ricardo)  Orthopaedic Surgery  [facility], [place] [address]  Phone: [phone]  Fax: [phone]  Follow Up Time: 2 weeks Parish Ricardo)  Orthopaedic Surgery  [address], [place] [address]  Phone: [phone]  Fax: [phone]  Follow Up Time: 1 week    Kinga Aguero)  Otolaryngology  [address] [place] [address], [address] [place] [address]  Phone: [phone]  Fax: [phone]  Follow Up Time:

## 2019-08-30 NOTE — PROGRESS NOTE ADULT - SUBJECTIVE AND OBJECTIVE BOX
Ortho Post Op Check    Procedure: Application of a halo vest  Surgeon: Dr. Salomon    Pt comfortable without complaints, pain controlled  Denies CP, SOB, N/V, numbness/tingling   Pt has 2 infiltrated  IVs that were in place      Vital Signs Last 24 Hrs  T(C): 37.1 (08-30-19 @ 18:55), Max: 37.2 (08-30-19 @ 16:55)  T(F): 98.8 (08-30-19 @ 18:55), Max: 99 (08-30-19 @ 16:55)  HR: 68 (08-30-19 @ 18:55) (68 - 86)  BP: 145/83 (08-30-19 @ 18:55) (128/94 - 165/92)  BP(mean): 112 (08-30-19 @ 18:55) (112 - 139)  RR: 24 (08-30-19 @ 18:55) (14 - 28)  SpO2: 100% (08-30-19 @ 18:55) (94% - 100%)      General: Pt Alert and oriented, NAD  Pin sites clean dry and intact. No drainage.   has HV drain in place  Pulses: 2+ Radial and DP bilaterally  LUE- Delt/Bicep/Tricep/Wrist Ext/ 5/5 strength   RUE- Delt/Bicep/Tricep/Wrist Ext/ 5/5 strength  Sensory:  LUE- SILT C5-T1 dermatomes  RUE- SILT C5-T1 dermatomes                        9.6    10.02 )-----------( 277      ( 30 Aug 2019 07:24 )             28.4   30 Aug 2019 07:24    138    |  102    |  7      ----------------------------<  95     4.1     |  28     |  0.61           A/P: 63yFemale POD#0 s/p placement of a halo vest  - Stable  - Pain Control  - DVT ppx: SCDs  - Post op abx: Ancef  - PT, WBS: WBAT    Ortho Pager [phone]

## 2019-08-30 NOTE — DISCHARGE NOTE PROVIDER - NSDCCPCAREPLAN_GEN_ALL_CORE_FT
PRINCIPAL DISCHARGE DIAGNOSIS  Diagnosis: Cervical stenosis of spine  Assessment and Plan of Treatment: Cervical stenosis of spine PRINCIPAL DISCHARGE DIAGNOSIS  Diagnosis: Cervical stenosis of spine  Assessment and Plan of Treatment: improvement s/p Anterior/Posterior Fusion C3-7, Halo application

## 2019-08-30 NOTE — DISCHARGE NOTE PROVIDER - NSDCACTIVITY_GEN_ALL_CORE
Do not drive or operate machinery/Do not make important decisions/No heavy lifting/straining Walking - Indoors allowed/Do not drive or operate machinery/Showering allowed/Do not make important decisions/No heavy lifting/straining Do not drive or operate machinery/Walking - Outdoors allowed/Stairs allowed/Showering allowed/Walking - Indoors allowed/No heavy lifting/straining Walking - Indoors allowed/Do not drive or operate machinery/Showering allowed/Do not make important decisions/No heavy lifting/straining/Walking - Outdoors allowed/Stairs allowed

## 2019-08-30 NOTE — PROGRESS NOTE ADULT - SUBJECTIVE AND OBJECTIVE BOX
Ortho Note    Pt comfortable without complaints, pain controlled  Denies CP, SOB, N/V, numbness/tingling     Vital Signs Last 24 Hrs  T(C): 36.7 (08-30-19 @ 13:30), Max: 36.7 (08-30-19 @ 13:30)  T(F): 98 (08-30-19 @ 13:30), Max: 98 (08-30-19 @ 13:30)  HR: 76 (08-30-19 @ 13:30) (76 - 76)  BP: 144/83 (08-30-19 @ 13:30) (144/83 - 144/83)  BP(mean): --  RR: 14 (08-30-19 @ 13:30) (14 - 14)  SpO2: 100% (08-30-19 @ 13:30) (100% - 100%)    General: Pt Alert and oriented, NAD  DSG C/D/I neck anterior/posterior, drain in place, modi in place  Pulses intact b/l UE & LE  Sensation intact b/l UE & LE  Motor: /bicep/tricep/EHL/FHL/TA/GS 5/5 b/l                          9.6    10.02 )-----------( 277      ( 30 Aug 2019 07:24 )             28.4   30 Aug 2019 07:24    138    |  102    |  7      ----------------------------<  95     4.1     |  28     |  0.61           A/P: 63yFemale POD#1 s/p PSF C3-7, recent ACDF C3-7  - Stable  - Pain Control  - DVT ppx: scds  - PT, WBS: wbat  - Got C-spine xrays  - NPO/IVF for OR today for halo application    Ortho Pager [phone]

## 2019-08-30 NOTE — PROGRESS NOTE ADULT - SUBJECTIVE AND OBJECTIVE BOX
Pain Management Progress Note - [facility] [phone]      HPI: Patient seen and examined today, patient in nad. Patient with a history of hypothyroidism,  and cervical stenosis of spine, s/p PSF C3-C7 POD#1. Patient reports neck and surgical site pain, patient refusing Dilaudid PO requesting Tramadol PO for pain. Reviewed pain medication regimen with patient. Patient reports adequate pain relief with Tramadol PO. Patient Axox3, denies n,v ,no s/s of oversedation.       Pain is ___ sharp ___x_dull ___burning _x__achy ___ Intensity: ____ mild _x__mod _x__severe     Location _x___surgical site __x__cervical _____lumbar ____abd ____upper ext____lower ext    Worse with __x__activity __x__movement _____physical therapy___ Rest    Improved with _x___medication __x__rest ____physical therapy      BUpivacaine liposome 1.3% Injectable (no eMAR)  metoprolol succinate ER  levothyroxine  lactated ringers.  acetaminophen   Tablet ..  HYDROmorphone   Tablet  morphine IVPB  ceFAZolin   IVPB  diazepam    Tablet  docusate sodium  senna  HYDROmorphone  Injectable  LORazepam   Injectable  LORazepam   Injectable  ceFAZolin  Injectable.  traMADol  traMADol  morphine  - Injectable        ROS: Const:  __-_febrile   Eyes:___ENT:___CV: _-__chest pain  Resp: __-__sob  GI:_-__nausea _-__vomiting _-__abd pain ___npo ___clears _x_full diet __bm  :___ Musk: __x_pain _x__spasm  Skin:___ Neuro:  __-_rkkylfef_-__zjphgjqbu__-_ numbness _-__weakness _x__paresth  Psych:__anxiety  Endo:___ Heme:___Allergy:_________, __x_all others reviewed and negative      PAST MEDICAL & SURGICAL HISTORY:  H/O: hypothyroidism  Cervical stenosis of spine  S/P cervical spinal fusion: ACDF C3-7  Surgery, elective: infertility procedures  Surgery, elective: shoulder -right x 3  Surgery, elective: left foot x 6 surgeries      08-30 @ 07:2496 mL/min/1.73M2      Hemoglobin: 9.6 g/dL (08-30 @ 07:24)        T(C): 36.7 (08-30-19 @ 08:27), Max: 36.8 (08-30-19 @ 04:37)  HR: 76 (08-30-19 @ 08:27) (76 - 98)  BP: 144/83 (08-30-19 @ 08:27) (113/61 - 155/95)  RR: 16 (08-30-19 @ 08:27) (12 - 18)  SpO2: 100% (08-30-19 @ 08:27) (94% - 100%)  Wt(kg): --       PHYSICAL EXAM:  Gen Appearance: x___no acute distress _x__appropriate        Neuro: _x__SILT feet____ EOM Intact Psych: AAOX_3_, x___mood/affect appropriate        Eyes: _x__conjunctiva WNL  ___x__ Pupils equal and round        ENT: x___ears and nose atraumatic__x_ Hearing grossly intact        Neck: _x__trachea midline, no visible masses ___thyroid without palpable mass    Resp: _x__Nml WOB____No tactile fremitus ___clear to auscultation    Cardio: __x_extremities free from edema __x__pedal pulses palpable    GI/Abdomen: _x__soft __x___ Nontender___x___Nondistended_____HSM    Lymphatic: ___no palpable nodes in neck  ___no palpable nodes calves and feet    Skin/Wound: ___Incision, _x__Dressing c/d/i,   ____surrounding tissues soft,  ___drain/chest tube present____    Muscular: EHL _5__/5  Gastrocnemius_5__/5    ___absent clubbing/cyanosis        ASSESSMENT: This is a 63y old Female with a history of hypothyroidism,  and cervical stenosis of spine, s/p PSF C3-C7 pod#1, pain managed with current pain medication reigmen.      Recommended Treatment PLAN:  1. Tramadol 25-50mg PO Q4h prn moderate to severe pain  2. Dilaudid 0.5mg Q2h IVP prn breakthrough pain  3. Flexeril 5mg Po Q8h prn muscle spasms  4. Scopolamine patch   Plan discussed with Dr. Parr

## 2019-08-31 LAB
ANION GAP SERPL CALC-SCNC: 13 MMOL/L — SIGNIFICANT CHANGE UP (ref 5–17)
BASOPHILS # BLD AUTO: 0.03 K/UL — SIGNIFICANT CHANGE UP (ref 0–0.2)
BASOPHILS NFR BLD AUTO: 0.4 % — SIGNIFICANT CHANGE UP (ref 0–2)
BUN SERPL-MCNC: 6 MG/DL — LOW (ref 7–23)
CALCIUM SERPL-MCNC: 8.7 MG/DL — SIGNIFICANT CHANGE UP (ref 8.4–10.5)
CHLORIDE SERPL-SCNC: 95 MMOL/L — LOW (ref 96–108)
CO2 SERPL-SCNC: 25 MMOL/L — SIGNIFICANT CHANGE UP (ref 22–31)
CREAT SERPL-MCNC: 0.6 MG/DL — SIGNIFICANT CHANGE UP (ref 0.5–1.3)
EOSINOPHIL # BLD AUTO: 0.15 K/UL — SIGNIFICANT CHANGE UP (ref 0–0.5)
EOSINOPHIL NFR BLD AUTO: 1.9 % — SIGNIFICANT CHANGE UP (ref 0–6)
GLUCOSE SERPL-MCNC: 89 MG/DL — SIGNIFICANT CHANGE UP (ref 70–99)
HCT VFR BLD CALC: 30.6 % — LOW (ref 34.5–45)
HGB BLD-MCNC: 10.4 G/DL — LOW (ref 11.5–15.5)
IMM GRANULOCYTES NFR BLD AUTO: 0.6 % — SIGNIFICANT CHANGE UP (ref 0–1.5)
LYMPHOCYTES # BLD AUTO: 1.56 K/UL — SIGNIFICANT CHANGE UP (ref 1–3.3)
LYMPHOCYTES # BLD AUTO: 19.6 % — SIGNIFICANT CHANGE UP (ref 13–44)
MCHC RBC-ENTMCNC: 32.5 PG — SIGNIFICANT CHANGE UP (ref 27–34)
MCHC RBC-ENTMCNC: 34 GM/DL — SIGNIFICANT CHANGE UP (ref 32–36)
MCV RBC AUTO: 95.6 FL — SIGNIFICANT CHANGE UP (ref 80–100)
MONOCYTES # BLD AUTO: 0.97 K/UL — HIGH (ref 0–0.9)
MONOCYTES NFR BLD AUTO: 12.2 % — SIGNIFICANT CHANGE UP (ref 2–14)
NEUTROPHILS # BLD AUTO: 5.18 K/UL — SIGNIFICANT CHANGE UP (ref 1.8–7.4)
NEUTROPHILS NFR BLD AUTO: 65.3 % — SIGNIFICANT CHANGE UP (ref 43–77)
NRBC # BLD: 0 /100 WBCS — SIGNIFICANT CHANGE UP (ref 0–0)
PLATELET # BLD AUTO: 265 K/UL — SIGNIFICANT CHANGE UP (ref 150–400)
POTASSIUM SERPL-MCNC: 3.8 MMOL/L — SIGNIFICANT CHANGE UP (ref 3.5–5.3)
POTASSIUM SERPL-SCNC: 3.8 MMOL/L — SIGNIFICANT CHANGE UP (ref 3.5–5.3)
RBC # BLD: 3.2 M/UL — LOW (ref 3.8–5.2)
RBC # FLD: 13.1 % — SIGNIFICANT CHANGE UP (ref 10.3–14.5)
SODIUM SERPL-SCNC: 133 MMOL/L — LOW (ref 135–145)
WBC # BLD: 7.94 K/UL — SIGNIFICANT CHANGE UP (ref 3.8–10.5)
WBC # FLD AUTO: 7.94 K/UL — SIGNIFICANT CHANGE UP (ref 3.8–10.5)

## 2019-08-31 RX ORDER — BACITRACIN ZINC 500 UNIT/G
1 OINTMENT IN PACKET (EA) TOPICAL DAILY
Refills: 0 | Status: DISCONTINUED | OUTPATIENT
Start: 2019-08-31 | End: 2019-09-06

## 2019-08-31 RX ORDER — TRAMADOL HYDROCHLORIDE 50 MG/1
50 TABLET ORAL EVERY 4 HOURS
Refills: 0 | Status: DISCONTINUED | OUTPATIENT
Start: 2019-08-31 | End: 2019-09-03

## 2019-08-31 RX ORDER — ACETYLCYSTEINE 200 MG/ML
4 VIAL (ML) MISCELLANEOUS
Refills: 0 | Status: COMPLETED | OUTPATIENT
Start: 2019-08-31 | End: 2019-09-01

## 2019-08-31 RX ORDER — BACITRACIN ZINC 500 UNIT/G
1 OINTMENT IN PACKET (EA) TOPICAL
Qty: 0 | Refills: 0 | DISCHARGE
Start: 2019-08-31

## 2019-08-31 RX ORDER — POTASSIUM CHLORIDE 20 MEQ
20 PACKET (EA) ORAL ONCE
Refills: 0 | Status: COMPLETED | OUTPATIENT
Start: 2019-08-31 | End: 2019-08-31

## 2019-08-31 RX ORDER — TRAMADOL HYDROCHLORIDE 50 MG/1
25 TABLET ORAL EVERY 4 HOURS
Refills: 0 | Status: DISCONTINUED | OUTPATIENT
Start: 2019-08-31 | End: 2019-09-03

## 2019-08-31 RX ADMIN — Medication 650 MILLIGRAM(S): at 06:06

## 2019-08-31 RX ADMIN — Medication 75 MICROGRAM(S): at 05:06

## 2019-08-31 RX ADMIN — TRAMADOL HYDROCHLORIDE 50 MILLIGRAM(S): 50 TABLET ORAL at 05:06

## 2019-08-31 RX ADMIN — Medication 0.5 MILLIGRAM(S): at 10:57

## 2019-08-31 RX ADMIN — TRAMADOL HYDROCHLORIDE 50 MILLIGRAM(S): 50 TABLET ORAL at 06:06

## 2019-08-31 RX ADMIN — Medication 4 MILLILITER(S): at 17:48

## 2019-08-31 RX ADMIN — Medication 1 APPLICATION(S): at 07:00

## 2019-08-31 RX ADMIN — Medication 200 MILLIGRAM(S): at 07:10

## 2019-08-31 RX ADMIN — Medication 100 MILLIGRAM(S): at 21:22

## 2019-08-31 RX ADMIN — TRAMADOL HYDROCHLORIDE 50 MILLIGRAM(S): 50 TABLET ORAL at 16:06

## 2019-08-31 RX ADMIN — TRAMADOL HYDROCHLORIDE 50 MILLIGRAM(S): 50 TABLET ORAL at 10:37

## 2019-08-31 RX ADMIN — Medication 100 MILLIGRAM(S): at 13:09

## 2019-08-31 RX ADMIN — Medication 20 MILLIEQUIVALENT(S): at 09:49

## 2019-08-31 RX ADMIN — Medication 100 MILLIGRAM(S): at 05:06

## 2019-08-31 RX ADMIN — Medication 650 MILLIGRAM(S): at 11:30

## 2019-08-31 RX ADMIN — TRAMADOL HYDROCHLORIDE 50 MILLIGRAM(S): 50 TABLET ORAL at 01:54

## 2019-08-31 RX ADMIN — TRAMADOL HYDROCHLORIDE 50 MILLIGRAM(S): 50 TABLET ORAL at 21:27

## 2019-08-31 RX ADMIN — Medication 650 MILLIGRAM(S): at 05:06

## 2019-08-31 RX ADMIN — TRAMADOL HYDROCHLORIDE 50 MILLIGRAM(S): 50 TABLET ORAL at 20:27

## 2019-08-31 RX ADMIN — TRAMADOL HYDROCHLORIDE 50 MILLIGRAM(S): 50 TABLET ORAL at 09:48

## 2019-08-31 RX ADMIN — CYCLOBENZAPRINE HYDROCHLORIDE 5 MILLIGRAM(S): 10 TABLET, FILM COATED ORAL at 06:53

## 2019-08-31 RX ADMIN — SENNA PLUS 2 TABLET(S): 8.6 TABLET ORAL at 21:22

## 2019-08-31 RX ADMIN — Medication 0.5 MILLIGRAM(S): at 21:22

## 2019-08-31 RX ADMIN — TRAMADOL HYDROCHLORIDE 50 MILLIGRAM(S): 50 TABLET ORAL at 00:54

## 2019-08-31 RX ADMIN — TRAMADOL HYDROCHLORIDE 50 MILLIGRAM(S): 50 TABLET ORAL at 16:43

## 2019-08-31 RX ADMIN — Medication 650 MILLIGRAM(S): at 10:57

## 2019-08-31 NOTE — PHYSICAL THERAPY INITIAL EVALUATION ADULT - ADDITIONAL COMMENTS
Patient live alone in private house with 2 steps to enter and 12 steps to second floor bedroom. Does not use any Assistive Devices at baseline. Denies recent Hx of falls.

## 2019-08-31 NOTE — PHYSICAL THERAPY INITIAL EVALUATION ADULT - PLANNED THERAPY INTERVENTIONS, PT EVAL
bed mobility training/balance training/gait training/strengthening/neuromuscular re-education/postural re-education/transfer training

## 2019-08-31 NOTE — PROGRESS NOTE ADULT - SUBJECTIVE AND OBJECTIVE BOX
Orthopaedic Spine Resident Note    S:  No acute overnight events.  Pain well controlled.   No fevers/chills/shortness of breath/chest pain/new neurologic complaints.    Vital Signs Last 24 Hrs  T(C): 36.3 (31 Aug 2019 05:00), Max: 37.2 (30 Aug 2019 16:55)  T(F): 97.4 (31 Aug 2019 05:00), Max: 99 (30 Aug 2019 16:55)  HR: 80 (31 Aug 2019 05:00) (68 - 86)  BP: 142/92 (31 Aug 2019 05:00) (128/94 - 165/92)  BP(mean): 112 (30 Aug 2019 18:55) (112 - 139)  RR: 16 (31 Aug 2019 05:00) (14 - 28)  SpO2: 95% (31 Aug 2019 05:00) (94% - 100%)    VSS  General: Well-appearing adult Female lying supine; NAD; A&Ox3  Neck: DSG CDI, collar in place  Hemovac drains x  1  Motor:  LUE- Delt/Bicep/Tricep/Wrist Ext/ 5/5 strength   RUE- Delt/Bicep/Tricep/Wrist Ext/ 5/5 strength  Sensory:  LUE- SILT C5-T1 dermatomes  RUE- SILT C5-T1 dermatomes  Vascular:  Hands WWP; Radial 2+ bilaterally; Cap refill < 2 sec    I&O's Summary    30 Aug 2019 07:01  -  31 Aug 2019 07:00  --------------------------------------------------------  IN: 0 mL / OUT: 1080 mL / NET: -1080 mL            Labs:                                           10.4   7.94  )-----------( 265      ( 31 Aug 2019 07:10 )             30.6     08-31    133<L>  |  95<L>  |  6<L>  ----------------------------<  89  3.8   |  25  |  0.60    Ca    8.7      31 Aug 2019 07:10          A/P: 63y Female ACDF C3-C7 8/23, C3-C7 posterior cervical fusion 8/29, application of halo vest 8/30    - Stable  - HALO collar on at all times  - Pain control  - Nausea control/Bowel regiment  - Home meds  - PT  - WBAT  - Monitor drain output   - DVT ppx: SCDs    Ortho Pager [phone]

## 2019-08-31 NOTE — PHYSICAL THERAPY INITIAL EVALUATION ADULT - CRITERIA FOR SKILLED THERAPEUTIC INTERVENTIONS
impairments found/rehab potential/predicted duration of therapy intervention/functional limitations in following categories/risk reduction/prevention/therapy frequency/anticipated discharge recommendation

## 2019-08-31 NOTE — PHYSICAL THERAPY INITIAL EVALUATION ADULT - GENERAL OBSERVATIONS, REHAB EVAL
Patient received semi-solorzano in bed  in NAD on RA, +SCDs, +Heplock, +Halo. Cleared by SATINDER Avilez. Agreeable to PT.

## 2019-08-31 NOTE — PHYSICAL THERAPY INITIAL EVALUATION ADULT - PERTINENT HX OF CURRENT PROBLEM, REHAB EVAL
63 y.o F with neck pain x chronic. Symptoms continue despite conservative treatment.Denies radiating pain or numbness. Subjectively prior to last week surgery some intermittent weakness of UE (dropping objects) which has improved. Denies incontinence or Lower Extremity. Now s/p Denies radiating pain or numbness. Subjectively prior to last week surgery some intermittent weakness of UE (dropping objects) which has improved. Denies incontinence or Lower Extremity. Now s/p above Sx.

## 2019-09-01 LAB
ANION GAP SERPL CALC-SCNC: 17 MMOL/L — SIGNIFICANT CHANGE UP (ref 5–17)
BASOPHILS # BLD AUTO: 0.05 K/UL — SIGNIFICANT CHANGE UP (ref 0–0.2)
BASOPHILS NFR BLD AUTO: 0.4 % — SIGNIFICANT CHANGE UP (ref 0–2)
BUN SERPL-MCNC: 6 MG/DL — LOW (ref 7–23)
CALCIUM SERPL-MCNC: 8.9 MG/DL — SIGNIFICANT CHANGE UP (ref 8.4–10.5)
CHLORIDE SERPL-SCNC: 90 MMOL/L — LOW (ref 96–108)
CO2 SERPL-SCNC: 21 MMOL/L — LOW (ref 22–31)
CREAT SERPL-MCNC: 0.46 MG/DL — LOW (ref 0.5–1.3)
EOSINOPHIL # BLD AUTO: 0.23 K/UL — SIGNIFICANT CHANGE UP (ref 0–0.5)
EOSINOPHIL NFR BLD AUTO: 2.1 % — SIGNIFICANT CHANGE UP (ref 0–6)
GLUCOSE SERPL-MCNC: 77 MG/DL — SIGNIFICANT CHANGE UP (ref 70–99)
HCT VFR BLD CALC: 35.2 % — SIGNIFICANT CHANGE UP (ref 34.5–45)
HGB BLD-MCNC: 11.6 G/DL — SIGNIFICANT CHANGE UP (ref 11.5–15.5)
IMM GRANULOCYTES NFR BLD AUTO: 0.8 % — SIGNIFICANT CHANGE UP (ref 0–1.5)
LYMPHOCYTES # BLD AUTO: 1.5 K/UL — SIGNIFICANT CHANGE UP (ref 1–3.3)
LYMPHOCYTES # BLD AUTO: 13.5 % — SIGNIFICANT CHANGE UP (ref 13–44)
MCHC RBC-ENTMCNC: 31 PG — SIGNIFICANT CHANGE UP (ref 27–34)
MCHC RBC-ENTMCNC: 33 GM/DL — SIGNIFICANT CHANGE UP (ref 32–36)
MCV RBC AUTO: 94.1 FL — SIGNIFICANT CHANGE UP (ref 80–100)
MONOCYTES # BLD AUTO: 1 K/UL — HIGH (ref 0–0.9)
MONOCYTES NFR BLD AUTO: 9 % — SIGNIFICANT CHANGE UP (ref 2–14)
NEUTROPHILS # BLD AUTO: 8.26 K/UL — HIGH (ref 1.8–7.4)
NEUTROPHILS NFR BLD AUTO: 74.2 % — SIGNIFICANT CHANGE UP (ref 43–77)
NRBC # BLD: 0 /100 WBCS — SIGNIFICANT CHANGE UP (ref 0–0)
PLATELET # BLD AUTO: 325 K/UL — SIGNIFICANT CHANGE UP (ref 150–400)
POTASSIUM SERPL-MCNC: 3.7 MMOL/L — SIGNIFICANT CHANGE UP (ref 3.5–5.3)
POTASSIUM SERPL-SCNC: 3.7 MMOL/L — SIGNIFICANT CHANGE UP (ref 3.5–5.3)
RBC # BLD: 3.74 M/UL — LOW (ref 3.8–5.2)
RBC # FLD: 12.4 % — SIGNIFICANT CHANGE UP (ref 10.3–14.5)
SODIUM SERPL-SCNC: 128 MMOL/L — LOW (ref 135–145)
WBC # BLD: 11.13 K/UL — HIGH (ref 3.8–10.5)
WBC # FLD AUTO: 11.13 K/UL — HIGH (ref 3.8–10.5)

## 2019-09-01 RX ORDER — ACETYLCYSTEINE 200 MG/ML
4 VIAL (ML) MISCELLANEOUS
Refills: 0 | Status: COMPLETED | OUTPATIENT
Start: 2019-09-01 | End: 2019-09-02

## 2019-09-01 RX ORDER — BENZOCAINE AND MENTHOL 5; 1 G/100ML; G/100ML
1 LIQUID ORAL THREE TIMES A DAY
Refills: 0 | Status: DISCONTINUED | OUTPATIENT
Start: 2019-09-01 | End: 2019-09-06

## 2019-09-01 RX ORDER — SODIUM CHLORIDE 9 MG/ML
1000 INJECTION INTRAMUSCULAR; INTRAVENOUS; SUBCUTANEOUS
Refills: 0 | Status: DISCONTINUED | OUTPATIENT
Start: 2019-09-01 | End: 2019-09-02

## 2019-09-01 RX ADMIN — SODIUM CHLORIDE 100 MILLILITER(S): 9 INJECTION INTRAMUSCULAR; INTRAVENOUS; SUBCUTANEOUS at 15:40

## 2019-09-01 RX ADMIN — Medication 75 MICROGRAM(S): at 06:17

## 2019-09-01 RX ADMIN — Medication 100 MILLIGRAM(S): at 06:17

## 2019-09-01 RX ADMIN — BENZOCAINE AND MENTHOL 1 LOZENGE: 5; 1 LIQUID ORAL at 22:11

## 2019-09-01 RX ADMIN — TRAMADOL HYDROCHLORIDE 50 MILLIGRAM(S): 50 TABLET ORAL at 02:56

## 2019-09-01 RX ADMIN — Medication 1 APPLICATION(S): at 06:18

## 2019-09-01 RX ADMIN — Medication 0.5 MILLIGRAM(S): at 22:11

## 2019-09-01 RX ADMIN — Medication 4 MILLILITER(S): at 06:17

## 2019-09-01 RX ADMIN — Medication 200 MILLIGRAM(S): at 06:17

## 2019-09-01 RX ADMIN — TRAMADOL HYDROCHLORIDE 50 MILLIGRAM(S): 50 TABLET ORAL at 20:27

## 2019-09-01 RX ADMIN — TRAMADOL HYDROCHLORIDE 50 MILLIGRAM(S): 50 TABLET ORAL at 01:56

## 2019-09-01 RX ADMIN — TRAMADOL HYDROCHLORIDE 50 MILLIGRAM(S): 50 TABLET ORAL at 20:57

## 2019-09-01 RX ADMIN — BENZOCAINE AND MENTHOL 1 LOZENGE: 5; 1 LIQUID ORAL at 17:13

## 2019-09-01 RX ADMIN — Medication 0.5 MILLIGRAM(S): at 11:42

## 2019-09-01 NOTE — OCCUPATIONAL THERAPY INITIAL EVALUATION ADULT - GENERAL OBSERVATIONS, REHAB EVAL
Right hand dominant. Patient cleared for Occupational Therapy by SATINDER Avilez, patient received supine in non-acute distress, +IV heplock, +posterior neck incision C/D/I, +Halo brace.

## 2019-09-01 NOTE — OCCUPATIONAL THERAPY INITIAL EVALUATION ADULT - PERTINENT HX OF CURRENT PROBLEM, REHAB EVAL
63 y.o F with neck pain x chronic. Symptoms continue despite conservative treatment. Had ACDF surgery here last week . Presents today for second stage procedure -  Presents for elective PSF C3-7, possible C2, ICBG. S/P ACDF C3-C7 8/23, C3-C7 posterior cervical fusion 8/29, application of halo vest 8/30.

## 2019-09-01 NOTE — OCCUPATIONAL THERAPY INITIAL EVALUATION ADULT - PLANNED THERAPY INTERVENTIONS, OT EVAL
balance training/parent/caregiver training.../transfer training/ADL retraining/IADL retraining/bed mobility training/ROM

## 2019-09-01 NOTE — OCCUPATIONAL THERAPY INITIAL EVALUATION ADULT - STANDING BALANCE: DYNAMIC, REHAB EVAL
initially ambulated 5 feet forward/backward with minAX1 and RW with c/o significant pain from heaviness of Halo brace; after resting sitting break patient ambulated 20 feet with RW, CGA for balance and minAX1 to assist with Halo brace weight relief onto back. Forward flexed posture 2/2 weight, VC for proper postural control/poor plus

## 2019-09-01 NOTE — OCCUPATIONAL THERAPY INITIAL EVALUATION ADULT - RANGE OF MOTION EXAMINATION, UPPER EXTREMITY
bilateral shoulder flexion limited by weight of brace and pain (90 degrees achieved), elbow/wrist/digits AROM WFL

## 2019-09-02 LAB
ANION GAP SERPL CALC-SCNC: 11 MMOL/L — SIGNIFICANT CHANGE UP (ref 5–17)
ANION GAP SERPL CALC-SCNC: 12 MMOL/L — SIGNIFICANT CHANGE UP (ref 5–17)
BASOPHILS # BLD AUTO: 0.02 K/UL — SIGNIFICANT CHANGE UP (ref 0–0.2)
BASOPHILS NFR BLD AUTO: 0.1 % — SIGNIFICANT CHANGE UP (ref 0–2)
BUN SERPL-MCNC: 3 MG/DL — LOW (ref 7–23)
BUN SERPL-MCNC: 5 MG/DL — LOW (ref 7–23)
CALCIUM SERPL-MCNC: 8 MG/DL — LOW (ref 8.4–10.5)
CALCIUM SERPL-MCNC: 8.4 MG/DL — SIGNIFICANT CHANGE UP (ref 8.4–10.5)
CHLORIDE SERPL-SCNC: 104 MMOL/L — SIGNIFICANT CHANGE UP (ref 96–108)
CHLORIDE SERPL-SCNC: 93 MMOL/L — LOW (ref 96–108)
CO2 SERPL-SCNC: 19 MMOL/L — LOW (ref 22–31)
CO2 SERPL-SCNC: 21 MMOL/L — LOW (ref 22–31)
CREAT SERPL-MCNC: 0.54 MG/DL — SIGNIFICANT CHANGE UP (ref 0.5–1.3)
CREAT SERPL-MCNC: 0.56 MG/DL — SIGNIFICANT CHANGE UP (ref 0.5–1.3)
EOSINOPHIL # BLD AUTO: 0.14 K/UL — SIGNIFICANT CHANGE UP (ref 0–0.5)
EOSINOPHIL NFR BLD AUTO: 1 % — SIGNIFICANT CHANGE UP (ref 0–6)
GLUCOSE SERPL-MCNC: 78 MG/DL — SIGNIFICANT CHANGE UP (ref 70–99)
GLUCOSE SERPL-MCNC: 98 MG/DL — SIGNIFICANT CHANGE UP (ref 70–99)
HCT VFR BLD CALC: 30.8 % — LOW (ref 34.5–45)
HGB BLD-MCNC: 10.6 G/DL — LOW (ref 11.5–15.5)
IMM GRANULOCYTES NFR BLD AUTO: 0.7 % — SIGNIFICANT CHANGE UP (ref 0–1.5)
LYMPHOCYTES # BLD AUTO: 0.9 K/UL — LOW (ref 1–3.3)
LYMPHOCYTES # BLD AUTO: 6.3 % — LOW (ref 13–44)
MCHC RBC-ENTMCNC: 31.8 PG — SIGNIFICANT CHANGE UP (ref 27–34)
MCHC RBC-ENTMCNC: 34.4 GM/DL — SIGNIFICANT CHANGE UP (ref 32–36)
MCV RBC AUTO: 92.5 FL — SIGNIFICANT CHANGE UP (ref 80–100)
MONOCYTES # BLD AUTO: 0.65 K/UL — SIGNIFICANT CHANGE UP (ref 0–0.9)
MONOCYTES NFR BLD AUTO: 4.6 % — SIGNIFICANT CHANGE UP (ref 2–14)
NEUTROPHILS # BLD AUTO: 12.42 K/UL — HIGH (ref 1.8–7.4)
NEUTROPHILS NFR BLD AUTO: 87.3 % — HIGH (ref 43–77)
NRBC # BLD: 0 /100 WBCS — SIGNIFICANT CHANGE UP (ref 0–0)
OSMOLALITY SERPL: 272 MOSMOL/KG — LOW (ref 280–301)
PLATELET # BLD AUTO: 361 K/UL — SIGNIFICANT CHANGE UP (ref 150–400)
POTASSIUM SERPL-MCNC: 3.1 MMOL/L — LOW (ref 3.5–5.3)
POTASSIUM SERPL-MCNC: 3.5 MMOL/L — SIGNIFICANT CHANGE UP (ref 3.5–5.3)
POTASSIUM SERPL-SCNC: 3.1 MMOL/L — LOW (ref 3.5–5.3)
POTASSIUM SERPL-SCNC: 3.5 MMOL/L — SIGNIFICANT CHANGE UP (ref 3.5–5.3)
RBC # BLD: 3.33 M/UL — LOW (ref 3.8–5.2)
RBC # FLD: 12.5 % — SIGNIFICANT CHANGE UP (ref 10.3–14.5)
SODIUM SERPL-SCNC: 124 MMOL/L — LOW (ref 135–145)
SODIUM SERPL-SCNC: 136 MMOL/L — SIGNIFICANT CHANGE UP (ref 135–145)
TSH SERPL-MCNC: 3.24 UIU/ML — SIGNIFICANT CHANGE UP (ref 0.35–4.94)
URATE SERPL-MCNC: 5.7 MG/DL — SIGNIFICANT CHANGE UP (ref 2.5–7)
WBC # BLD: 14.23 K/UL — HIGH (ref 3.8–10.5)
WBC # FLD AUTO: 14.23 K/UL — HIGH (ref 3.8–10.5)

## 2019-09-02 PROCEDURE — 71045 X-RAY EXAM CHEST 1 VIEW: CPT | Mod: 26

## 2019-09-02 PROCEDURE — 99223 1ST HOSP IP/OBS HIGH 75: CPT | Mod: GC

## 2019-09-02 RX ADMIN — Medication 1 APPLICATION(S): at 13:18

## 2019-09-02 RX ADMIN — Medication 200 MILLIGRAM(S): at 06:00

## 2019-09-02 RX ADMIN — TRAMADOL HYDROCHLORIDE 50 MILLIGRAM(S): 50 TABLET ORAL at 22:04

## 2019-09-02 RX ADMIN — Medication 0.5 MILLIGRAM(S): at 06:01

## 2019-09-02 RX ADMIN — TRAMADOL HYDROCHLORIDE 50 MILLIGRAM(S): 50 TABLET ORAL at 06:00

## 2019-09-02 RX ADMIN — Medication 0.5 MILLIGRAM(S): at 16:31

## 2019-09-02 RX ADMIN — BENZOCAINE AND MENTHOL 1 LOZENGE: 5; 1 LIQUID ORAL at 01:08

## 2019-09-02 RX ADMIN — TRAMADOL HYDROCHLORIDE 50 MILLIGRAM(S): 50 TABLET ORAL at 17:24

## 2019-09-02 RX ADMIN — TRAMADOL HYDROCHLORIDE 50 MILLIGRAM(S): 50 TABLET ORAL at 22:34

## 2019-09-02 RX ADMIN — Medication 75 MICROGRAM(S): at 06:00

## 2019-09-02 RX ADMIN — TRAMADOL HYDROCHLORIDE 50 MILLIGRAM(S): 50 TABLET ORAL at 18:24

## 2019-09-02 RX ADMIN — TRAMADOL HYDROCHLORIDE 50 MILLIGRAM(S): 50 TABLET ORAL at 06:30

## 2019-09-02 NOTE — CONSULT NOTE ADULT - ASSESSMENT
63F PMH chronic neck pain now s// anterior and posterior cervical spinal fusion surgeries s/p drain and halo in palce. Medicine service for concern for PNA given uptrending WBC 14.23 < 10 at admission, cough. As pt denies productive cough, CXR negative for infiltrate (more likely non-specific interstitial markings due to poor inspiratory effort) low suspicion for infectious process such as pneumonia or hospital-acquired PNA.     Plan   -Please obtain Respiratory Viral Panel nasal swab  -Monitor vital signs for fever, tachypnea, new O2 requirement   -Incentive spirometer encouraged  -Proper swallowing hygiene observed, Pt encouraged to sit in chair for meals   -If Patient does develop PNA clinical picture would start broadly with Vanco q12 and Zosyn for HAP, obtain blood cultures and sputum cx before de-escalating   -Medicine will follow, thank you for the consult 63F PMH chronic neck pain now s// anterior and posterior cervical spinal fusion surgeries s/p drain and halo in palce. Medicine service for concern for PNA given uptrending WBC 14.23 < 10 at admission, cough. As pt denies productive cough, CXR negative for infiltrate (more likely non-specific interstitial markings due to poor inspiratory effort) low suspicion for infectious process such as pneumonia or hospital-acquired PNA.     Plan   #R/o Pneumonia   -Please obtain Respiratory Viral Panel nasal swab  -Would advise Speech and Swallow Eval   -Monitor vital signs for fever, tachypnea, new O2 requirement   -Incentive spirometer encouraged  -Proper swallowing hygiene observed, Pt encouraged to sit in chair for meals   -If Patient does develop PNA clinical picture would start broadly with Vanco q12 and Zosyn for HAP, obtain blood cultures and sputum cx before de-escalating       #Subacute hyponatremia 2/2 pain vs decreased po intake vs SIADH  -Please obtain BMP, serum osmolality, urine sodium, urine urea, urine osmo, TSH     #Polypharmacy for Anxiety   -Pt is on home Klonopin and has multiple sedating PRNS she has not used (Valium, ativan) as well as tramadol  -Would avoid long-acting Valium use in hospital setting 63F PMH chronic neck pain now s// anterior and posterior cervical spinal fusion surgeries s/p drain and halo in palce. Medicine service for concern for PNA given uptrending WBC 14.23 < 10 at admission, cough. As pt denies productive cough, CXR negative for infiltrate (more likely non-specific interstitial markings due to poor inspiratory effort) low suspicion for infectious process such as pneumonia or hospital-acquired PNA.     Plan   #R/o Pneumonia   -Please obtain Respiratory Viral Panel nasal swab  -Monitor vital signs for fever, tachypnea, new O2 requirement   -Incentive spirometer encouraged  -If Patient does develop PNA clinical picture would start broadly with Vanco q12 and Zosyn for HAP, obtain blood cultures and sputum cx before de-escalating     #Dysphagia   -Would advise Speech and Swallow Eval   -Proper swallowing hygiene observed, Pt encouraged to sit in chair for meals   -Trial of 2wk PPI in AM       #Subacute hyponatremia 2/2 pain vs decreased po intake vs SIADH  -Please obtain BMP, serum osmolality, urine sodium, urine urea, urine osmo, TSH     #Polypharmacy for Anxiety   -Pt is on home Klonopin and has multiple sedating PRNS she has not used (Valium, ativan) as well as tramadol  -Would avoid long-acting Valium use in hospital setting

## 2019-09-02 NOTE — CONSULT NOTE ADULT - SUBJECTIVE AND OBJECTIVE BOX
63F PMH chronic neck pain now s/p ACDF C3-C7, surgery last week  currently admitted for second stage, elective PSF C3-7 on 8/29 with drain now removed and application of halo 8/30. Medicine service for concern for PNA given uptrending WBC 14.23 < 10 at admission, cough, and possible infiltrate on CXR. At interview: Pt endorses cough when she swallows pudding "like food leftover". Denies dry or productive cough at other times, no fevers, chills, SoB, pleuritic chest pain, abdominal pain, n/v/d/c. Pt states outside of ortho procedures she is rarely ill, no sick contacts at home, non-smoker.     VITAL SIGNS:  T(F): 98.1 (09-02-19 @ 15:31)  HR: 89 (09-02-19 @ 15:31)  BP: 126/87 (09-02-19 @ 15:31)  RR: 16 (09-02-19 @ 15:31)  SpO2: 98% (09-02-19 @ 15:31)  Wt(kg): --    PHYSICAL EXAM:    Constitutional: WDWN, NAD wearing collar and surgical vest   HEENT: PERRL, EOMI, sclera non-icteric, neck supple, trachea midline, no masses, no JVD, MMM, good dentition, well healed scar over cervical midline no c/d/o  Respiratory: CTA b/l, good air entry b/l, no wheezing, no rhonchi, no rales, without accessory muscle use and no intercostal retractions  Cardiovascular: RRR, normal S1S2, no M/R/G  Gastrointestinal: soft, NTND, no masses palpable, BS normal  Extremities: Warm, well perfused, pulses equal bilateral upper and lower extremities, no edema, no clubbing  Neurological: AAOx3, CN Grossly intact  Skin: Normal temperature, warm, dry    MEDICATIONS  (STANDING):  BACItracin   Ointment 1 Application(s) Topical daily  BUpivacaine liposome 1.3% Injectable (no eMAR) 20 milliLiter(s) Local Injection once  clonazePAM  Tablet 0.5 milliGRAM(s) Oral two times a day  docusate sodium 100 milliGRAM(s) Oral three times a day  levothyroxine 75 MICROGram(s) Oral daily  metoprolol succinate  milliGRAM(s) Oral daily  senna 2 Tablet(s) Oral at bedtime  sodium chloride 0.9%. 1000 milliLiter(s) (100 mL/Hr) IV Continuous <Continuous>    MEDICATIONS  (PRN):  acetaminophen   Tablet .. 650 milliGRAM(s) Oral every 6 hours PRN Mild Pain (1 - 3)  benzocaine 15 mG/menthol 3.6 mG Lozenge 1 Lozenge Oral three times a day PRN Sore Throat  cyclobenzaprine 5 milliGRAM(s) Oral three times a day PRN Muscle Spasm  diazepam    Tablet 5 milliGRAM(s) Oral every 12 hours PRN Anxiety  LORazepam   Injectable 0.5 milliGRAM(s) IV Push once PRN Anxiety  morphine  - Injectable 2 milliGRAM(s) IV Push every 3 hours PRN Breakthrough Pain  traMADol 25 milliGRAM(s) Oral every 4 hours PRN Moderate Pain (4 - 6)  traMADol 50 milliGRAM(s) Oral every 4 hours PRN Severe Pain (7 - 10)      Allergies    No Known Allergies    Intolerances        LABS:                        10.6   14.23 )-----------( 361      ( 02 Sep 2019 07:11 )             30.8     09-02    124<L>  |  93<L>  |  5<L>  ----------------------------<  78  3.5   |  19<L>  |  0.56    Ca    8.0<L>      02 Sep 2019 07:11            RADIOLOGY & ADDITIONAL TESTS: no acute infiltrate

## 2019-09-02 NOTE — PROGRESS NOTE ADULT - SUBJECTIVE AND OBJECTIVE BOX
Orthopaedic Spine Resident Note    S:  No acute overnight events.  Pain well controlled. Drain removed  No fevers/chills/shortness of breath/chest pain/new neurologic complaints.    Vital Signs Last 24 Hrs  T(C): 36.9 (01 Sep 2019 20:22), Max: 36.9 (01 Sep 2019 20:22)  T(F): 98.4 (01 Sep 2019 20:22), Max: 98.4 (01 Sep 2019 20:22)  HR: 90 (01 Sep 2019 20:22) (79 - 90)  BP: 136/83 (01 Sep 2019 20:22) (136/83 - 161/94)  BP(mean): --  RR: 17 (01 Sep 2019 20:22) (15 - 17)  SpO2: 99% (01 Sep 2019 20:22) (97% - 100%)    VSS  General: Well-appearing adult Female lying supine; NAD; A&Ox3  Neck: DSG CDI, collar in place  Hemovac drains x  1  Motor:  LUE- Delt/Bicep/Tricep/Wrist Ext/ 5/5 strength   RUE- Delt/Bicep/Tricep/Wrist Ext/ 5/5 strength  Sensory:  LUE- SILT C5-T1 dermatomes  RUE- SILT C5-T1 dermatomes  Vascular:  Hands WWP; Radial 2+ bilaterally; Cap refill < 2 sec              Labs:                                         10.6   14.23 )-----------( 361      ( 02 Sep 2019 07:11 )             30.8         A/P: 63y Female ACDF C3-C7 8/23, C3-C7 posterior cervical fusion 8/29, application of halo vest 8/30    - Stable  - HALO collar on at all times  - Pain control  - Nausea control/Bowel regiment  - Home meds  - PT  - WBAT  - Monitor drain output   - DVT ppx: [facility]    Ortho Pager [phone]

## 2019-09-03 DIAGNOSIS — R05 COUGH: ICD-10-CM

## 2019-09-03 DIAGNOSIS — D62 ACUTE POSTHEMORRHAGIC ANEMIA: ICD-10-CM

## 2019-09-03 LAB
ANION GAP SERPL CALC-SCNC: 12 MMOL/L — SIGNIFICANT CHANGE UP (ref 5–17)
BASOPHILS # BLD AUTO: 0.03 K/UL — SIGNIFICANT CHANGE UP (ref 0–0.2)
BASOPHILS NFR BLD AUTO: 0.3 % — SIGNIFICANT CHANGE UP (ref 0–2)
BUN SERPL-MCNC: 3 MG/DL — LOW (ref 7–23)
CALCIUM SERPL-MCNC: 8 MG/DL — LOW (ref 8.4–10.5)
CHLORIDE SERPL-SCNC: 105 MMOL/L — SIGNIFICANT CHANGE UP (ref 96–108)
CO2 SERPL-SCNC: 21 MMOL/L — LOW (ref 22–31)
CREAT SERPL-MCNC: 0.54 MG/DL — SIGNIFICANT CHANGE UP (ref 0.5–1.3)
EOSINOPHIL # BLD AUTO: 0.13 K/UL — SIGNIFICANT CHANGE UP (ref 0–0.5)
EOSINOPHIL NFR BLD AUTO: 1.3 % — SIGNIFICANT CHANGE UP (ref 0–6)
GLUCOSE SERPL-MCNC: 86 MG/DL — SIGNIFICANT CHANGE UP (ref 70–99)
HCT VFR BLD CALC: 30.1 % — LOW (ref 34.5–45)
HGB BLD-MCNC: 10.4 G/DL — LOW (ref 11.5–15.5)
IMM GRANULOCYTES NFR BLD AUTO: 0.7 % — SIGNIFICANT CHANGE UP (ref 0–1.5)
LYMPHOCYTES # BLD AUTO: 1.04 K/UL — SIGNIFICANT CHANGE UP (ref 1–3.3)
LYMPHOCYTES # BLD AUTO: 10.7 % — LOW (ref 13–44)
MCHC RBC-ENTMCNC: 32.1 PG — SIGNIFICANT CHANGE UP (ref 27–34)
MCHC RBC-ENTMCNC: 34.6 GM/DL — SIGNIFICANT CHANGE UP (ref 32–36)
MCV RBC AUTO: 92.9 FL — SIGNIFICANT CHANGE UP (ref 80–100)
MONOCYTES # BLD AUTO: 0.72 K/UL — SIGNIFICANT CHANGE UP (ref 0–0.9)
MONOCYTES NFR BLD AUTO: 7.4 % — SIGNIFICANT CHANGE UP (ref 2–14)
NEUTROPHILS # BLD AUTO: 7.7 K/UL — HIGH (ref 1.8–7.4)
NEUTROPHILS NFR BLD AUTO: 79.6 % — HIGH (ref 43–77)
NRBC # BLD: 0 /100 WBCS — SIGNIFICANT CHANGE UP (ref 0–0)
OSMOLALITY UR: 241 MOSMOL/KG — SIGNIFICANT CHANGE UP (ref 100–650)
PLATELET # BLD AUTO: 327 K/UL — SIGNIFICANT CHANGE UP (ref 150–400)
POTASSIUM SERPL-MCNC: 3.2 MMOL/L — LOW (ref 3.5–5.3)
POTASSIUM SERPL-SCNC: 3.2 MMOL/L — LOW (ref 3.5–5.3)
RBC # BLD: 3.24 M/UL — LOW (ref 3.8–5.2)
RBC # FLD: 13.2 % — SIGNIFICANT CHANGE UP (ref 10.3–14.5)
SODIUM SERPL-SCNC: 138 MMOL/L — SIGNIFICANT CHANGE UP (ref 135–145)
WBC # BLD: 9.69 K/UL — SIGNIFICANT CHANGE UP (ref 3.8–10.5)
WBC # FLD AUTO: 9.69 K/UL — SIGNIFICANT CHANGE UP (ref 3.8–10.5)

## 2019-09-03 PROCEDURE — 99222 1ST HOSP IP/OBS MODERATE 55: CPT | Mod: GC

## 2019-09-03 RX ORDER — ACETAMINOPHEN 500 MG
2 TABLET ORAL
Qty: 0 | Refills: 0 | DISCHARGE
Start: 2019-09-03

## 2019-09-03 RX ORDER — HYDROMORPHONE HYDROCHLORIDE 2 MG/ML
4 INJECTION INTRAMUSCULAR; INTRAVENOUS; SUBCUTANEOUS EVERY 4 HOURS
Refills: 0 | Status: DISCONTINUED | OUTPATIENT
Start: 2019-09-03 | End: 2019-09-04

## 2019-09-03 RX ORDER — HYDROMORPHONE HYDROCHLORIDE 2 MG/ML
1 INJECTION INTRAMUSCULAR; INTRAVENOUS; SUBCUTANEOUS
Qty: 0 | Refills: 0 | DISCHARGE
Start: 2019-09-03

## 2019-09-03 RX ORDER — ACETAMINOPHEN 500 MG
650 TABLET ORAL EVERY 6 HOURS
Refills: 0 | Status: DISCONTINUED | OUTPATIENT
Start: 2019-09-03 | End: 2019-09-06

## 2019-09-03 RX ORDER — POTASSIUM CHLORIDE 20 MEQ
40 PACKET (EA) ORAL EVERY 4 HOURS
Refills: 0 | Status: COMPLETED | OUTPATIENT
Start: 2019-09-03 | End: 2019-09-03

## 2019-09-03 RX ORDER — CLONAZEPAM 1 MG
0 TABLET ORAL
Qty: 0 | Refills: 0 | DISCHARGE

## 2019-09-03 RX ORDER — HYDROMORPHONE HYDROCHLORIDE 2 MG/ML
2 INJECTION INTRAMUSCULAR; INTRAVENOUS; SUBCUTANEOUS EVERY 4 HOURS
Refills: 0 | Status: DISCONTINUED | OUTPATIENT
Start: 2019-09-03 | End: 2019-09-04

## 2019-09-03 RX ORDER — CLONAZEPAM 1 MG
1 TABLET ORAL
Qty: 0 | Refills: 0 | DISCHARGE
Start: 2019-09-03

## 2019-09-03 RX ORDER — POTASSIUM CHLORIDE 20 MEQ
20 PACKET (EA) ORAL DAILY
Refills: 0 | Status: DISCONTINUED | OUTPATIENT
Start: 2019-09-03 | End: 2019-09-03

## 2019-09-03 RX ORDER — CYCLOBENZAPRINE HYDROCHLORIDE 10 MG/1
10 TABLET, FILM COATED ORAL THREE TIMES A DAY
Refills: 0 | Status: DISCONTINUED | OUTPATIENT
Start: 2019-09-03 | End: 2019-09-05

## 2019-09-03 RX ADMIN — MORPHINE SULFATE 2 MILLIGRAM(S): 50 CAPSULE, EXTENDED RELEASE ORAL at 09:04

## 2019-09-03 RX ADMIN — Medication 200 MILLIGRAM(S): at 05:37

## 2019-09-03 RX ADMIN — Medication 75 MICROGRAM(S): at 05:37

## 2019-09-03 RX ADMIN — Medication 0.5 MILLIGRAM(S): at 17:13

## 2019-09-03 RX ADMIN — HYDROMORPHONE HYDROCHLORIDE 2 MILLIGRAM(S): 2 INJECTION INTRAMUSCULAR; INTRAVENOUS; SUBCUTANEOUS at 20:32

## 2019-09-03 RX ADMIN — Medication 0.5 MILLIGRAM(S): at 05:37

## 2019-09-03 RX ADMIN — Medication 40 MILLIEQUIVALENT(S): at 17:13

## 2019-09-03 RX ADMIN — Medication 40 MILLIEQUIVALENT(S): at 11:56

## 2019-09-03 RX ADMIN — TRAMADOL HYDROCHLORIDE 50 MILLIGRAM(S): 50 TABLET ORAL at 07:55

## 2019-09-03 RX ADMIN — Medication 1 APPLICATION(S): at 11:56

## 2019-09-03 RX ADMIN — MORPHINE SULFATE 2 MILLIGRAM(S): 50 CAPSULE, EXTENDED RELEASE ORAL at 09:19

## 2019-09-03 RX ADMIN — HYDROMORPHONE HYDROCHLORIDE 2 MILLIGRAM(S): 2 INJECTION INTRAMUSCULAR; INTRAVENOUS; SUBCUTANEOUS at 14:31

## 2019-09-03 RX ADMIN — HYDROMORPHONE HYDROCHLORIDE 2 MILLIGRAM(S): 2 INJECTION INTRAMUSCULAR; INTRAVENOUS; SUBCUTANEOUS at 21:32

## 2019-09-03 RX ADMIN — TRAMADOL HYDROCHLORIDE 50 MILLIGRAM(S): 50 TABLET ORAL at 07:25

## 2019-09-03 RX ADMIN — HYDROMORPHONE HYDROCHLORIDE 2 MILLIGRAM(S): 2 INJECTION INTRAMUSCULAR; INTRAVENOUS; SUBCUTANEOUS at 13:31

## 2019-09-03 NOTE — CONSULT NOTE ADULT - PROBLEM SELECTOR RECOMMENDATION 9
Discussed with house staff and nursing staff.  Pain management, PT, incentive spirometer, DVT prophylaxis, repeat labs and discharge planning.
the chest exam is normal.  CXR is unremarkable.  patient is ad=febrile and no leucocytosis.  Possible aspiration.  patient has hisotory of surgical correction of hiatal hernia.  Recommend swallow evaluation.  No need for antibiotic.  Base line oxygen saturation is normal

## 2019-09-03 NOTE — SWALLOW BEDSIDE ASSESSMENT ADULT - PHARYNGEAL PHASE
Immediate cough after 2nd trial of solid. Pt c/o difficulty w pharyngeal transit, worse w chewable solids compared to liquids/purees. Immediate cough after 2nd trial of solid. Pt stated that she felt as though a small piece of the softened eliza cracker was stuck in a "pocket" in her throat and after coughing, the pt was able to retrieve a small piece of the eliza cracker from her mouth.

## 2019-09-03 NOTE — PROGRESS NOTE ADULT - SUBJECTIVE AND OBJECTIVE BOX
Orthopaedic Spine Resident Note    S:  Patient with dysphagia when eating, SLP consult ordered for today. Complains of halo pain. Hyponatremia resolved  No fevers/chills/shortness of breath/chest pain/new neurologic complaints.    Vital Signs Last 24 Hrs  T(C): 36.4 (03 Sep 2019 04:53), Max: 36.7 (02 Sep 2019 15:31)  T(F): 97.5 (03 Sep 2019 04:53), Max: 98.1 (02 Sep 2019 15:31)  HR: 91 (03 Sep 2019 04:53) (89 - 91)  BP: 121/83 (03 Sep 2019 04:53) (107/71 - 126/87)  BP(mean): --  RR: 16 (03 Sep 2019 04:53) (16 - 16)  SpO2: 99% (03 Sep 2019 04:53) (98% - 99%)    VSS  General: Well-appearing adult Female lying supine; NAD; A&Ox3  Neck: DSG CDI, collar in place  Hemovac drains x  1  Motor:  LUE- Delt/Bicep/Tricep/Wrist Ext/ 5/5 strength   RUE- Delt/Bicep/Tricep/Wrist Ext/ 5/5 strength  Sensory:  LUE- SILT C5-T1 dermatomes  RUE- SILT C5-T1 dermatomes  Vascular:  Hands WWP; Radial 2+ bilaterally; Cap refill < 2 sec      Labs:                                                              10.4   9.69  )-----------( 327      ( 03 Sep 2019 06:48 )             30.1                09-03    138  |  105  |  3<L>  ----------------------------<  86  3.2<L>   |  21<L>  |  0.54    Ca    8.0<L>      03 Sep 2019 06:48          A/P: 63y Female ACDF C3-C7 8/23, C3-C7 posterior cervical fusion 8/29, application of halo vest 8/30    - Stable  - HALO collar on at all times  - Pain control  - Appreciate medicine consult  - Nausea control/Bowel regiment  - Home meds  - PT  - WBAT  - Monitor drain output   - DVT ppx: [facility]    Ortho Pager [phone]

## 2019-09-03 NOTE — SWALLOW BEDSIDE ASSESSMENT ADULT - ORAL PHASE
Prolonged mastication. Pt stated that w solids, she has to chew until the food almost becomes a "powder" in order for her to swallow it.

## 2019-09-03 NOTE — CONSULT NOTE ADULT - SUBJECTIVE AND OBJECTIVE BOX
Patient is a 63y old  Female who presents with a chief complaint of neck pain/surgery (03 Sep 2019 10:11)      HPI:  63 y.o F with neck pain x chronic. Symptoms continue despite conservative treatment.  she has a cough and started after the surgery the sputum sometimes has food particles.    Right hand dominant. Independent ambulator. (28 Aug 2019 14:27)      PAST MEDICAL & SURGICAL HISTORY:  H/O: hypothyroidism  Cervical stenosis of spine  S/P cervical spinal fusion: ACDF C3-7  Surgery, elective: infertility procedures  Surgery, elective: shoulder -right x 3  Surgery, elective: left foot x 6 surgeries      FAMILY HISTORY:      SOCIAL HISTORY:  Smoking Status: [ ] Current, [ ] Former, [ ] Never  Pack Years:    MEDICATIONS:  Pulmonary:    Antimicrobials:    Anticoagulants:    Onc:    GI/:  docusate sodium 100 milliGRAM(s) Oral three times a day  senna 2 Tablet(s) Oral at bedtime    Endocrine:  levothyroxine 75 MICROGram(s) Oral daily    Cardiac:  metoprolol succinate  milliGRAM(s) Oral daily    Other Medications:  acetaminophen   Tablet .. 650 milliGRAM(s) Oral every 6 hours PRN  BACItracin   Ointment 1 Application(s) Topical daily  benzocaine 15 mG/menthol 3.6 mG Lozenge 1 Lozenge Oral three times a day PRN  BUpivacaine liposome 1.3% Injectable (no eMAR) 20 milliLiter(s) Local Injection once  clonazePAM  Tablet 0.5 milliGRAM(s) Oral two times a day  cyclobenzaprine 10 milliGRAM(s) Oral three times a day PRN  HYDROmorphone   Tablet 2 milliGRAM(s) Oral every 4 hours PRN  HYDROmorphone   Tablet 4 milliGRAM(s) Oral every 4 hours PRN  LORazepam   Injectable 0.5 milliGRAM(s) IV Push once PRN  morphine  - Injectable 2 milliGRAM(s) IV Push every 3 hours PRN      Allergies    No Known Allergies    Intolerances        Vital Signs Last 24 Hrs  T(C): 37.4 (03 Sep 2019 20:39), Max: 37.4 (03 Sep 2019 20:39)  T(F): 99.3 (03 Sep 2019 20:39), Max: 99.3 (03 Sep 2019 20:39)  HR: 100 (03 Sep 2019 20:39) (78 - 100)  BP: 125/80 (03 Sep 2019 20:39) (120/81 - 125/80)  BP(mean): --  RR: 16 (03 Sep 2019 20:39) (16 - 16)  SpO2: 98% (03 Sep 2019 20:39) (98% - 100%)    09-02 @ 07:01  -  09-03 @ 07:00  --------------------------------------------------------  IN: 120 mL / OUT: 1000 mL / NET: -880 mL          LABS:      CBC Full  -  ( 03 Sep 2019 06:48 )  WBC Count : 9.69 K/uL  RBC Count : 3.24 M/uL  Hemoglobin : 10.4 g/dL  Hematocrit : 30.1 %  Platelet Count - Automated : 327 K/uL  Mean Cell Volume : 92.9 fl  Mean Cell Hemoglobin : 32.1 pg  Mean Cell Hemoglobin Concentration : 34.6 gm/dL  Auto Neutrophil # : 7.70 K/uL  Auto Lymphocyte # : 1.04 K/uL  Auto Monocyte # : 0.72 K/uL  Auto Eosinophil # : 0.13 K/uL  Auto Basophil # : 0.03 K/uL  Auto Neutrophil % : 79.6 %  Auto Lymphocyte % : 10.7 %  Auto Monocyte % : 7.4 %  Auto Eosinophil % : 1.3 %  Auto Basophil % : 0.3 %    09-03    138  |  105  |  3<L>  ----------------------------<  86  3.2<L>   |  21<L>  |  0.54    Ca    8.0<L>      03 Sep 2019 06:48            < from: Xray Chest 1 View- PORTABLE-Urgent (09.02.19 @ 11:21) >    EXAM:  XR CHEST PORTABLE URGENT 1V                          PROCEDURE DATE:  09/02/2019          INTERPRETATION:  CLINICAL INDICATION: 63-year-old with cough.      IMPRESSION: Frontal view of the chest is limited by overlying artifact.   Anterior and posterior partially visualized cervical fusion hardware.   Severe curvature the thoracolumbar spine. Normal heart size. No   consolidation. No pleural effusion.    < end of copied text >                RADIOLOGY & ADDITIONAL STUDIES (The following images were personally reviewed):

## 2019-09-03 NOTE — SWALLOW BEDSIDE ASSESSMENT ADULT - SWALLOW EVAL: DIAGNOSIS
Pt p/w suspected pharyngeal dysphagia characterized by pt c/o difficulty w pharyngeal transit of solids s/p ACDF and PSF, resulting in cough after solids and pt reports of needing to expectorate solids. Given this, it is recommended that the pt be downgraded to a diet of pureed solids and thin liquids.

## 2019-09-03 NOTE — SWALLOW BEDSIDE ASSESSMENT ADULT - ADDITIONAL RECOMMENDATIONS
This SVC will f/u to monitor diet tolerance. If swallowing does not continue to improve clinically, pt would benefit from further instrumental study via VFSS.

## 2019-09-03 NOTE — SWALLOW BEDSIDE ASSESSMENT ADULT - SLP GENERAL OBSERVATIONS
Pt was received feeding herself from lunch tray while HOB was angled at 32-degrees. Pt's HOB was elevated to as much as tolerated: 41-degrees. She reported difficulty w solids, and stated that after her ACDF procedure, she was primarily eating puree/thin. Pt was received feeding herself from lunch tray while HOB was angled at 32-degrees. Pt stated that she was having difficulty w the salmon from the lunch tray, and was spitting out much of it. Pt's HOB was elevated to as much as tolerated: 41-degrees. She reported difficulty w solids, and stated that after her ACDF procedure, she was primarily eating puree/thin.

## 2019-09-03 NOTE — PROGRESS NOTE ADULT - SUBJECTIVE AND OBJECTIVE BOX
Ortho Note    Pt c/o halo device being uncomfortable and difficulty eating due to it also.  Denies CP, SOB, N/V, numbness/tingling     Vital Signs Last 24 Hrs  T(C): 36.7 (09-03-19 @ 08:34), Max: 36.7 (09-03-19 @ 08:34)  T(F): 98.1 (09-03-19 @ 08:34), Max: 98.1 (09-03-19 @ 08:34)  HR: 78 (09-03-19 @ 08:34) (78 - 91)  BP: 120/81 (09-03-19 @ 08:34) (120/81 - 121/83)  BP(mean): --  RR: 16 (09-03-19 @ 08:34) (16 - 16)  SpO2: 100% (09-03-19 @ 08:34) (99% - 100%)    General: Pt Alert and oriented, NAD  incisions c/d/i anterior/posterior neck  Pulses intact b/l UE  Sensation intact b/l UE  Motor: /bicep/tricep 5/5 b/l                          10.4   9.69  )-----------( 327      ( 03 Sep 2019 06:48 )             30.1   03 Sep 2019 06:48    138    |  105    |  3      ----------------------------<  86     3.2     |  21     |  0.54     Ca    8.0        03 Sep 2019 06:48        A/P: 63yFemale POD#5 s/p PSF C3-7  - Stable  - Pain Management consult  - Speech and swallow consult  - DVT ppx scds  - PT, WBS: wbat  - K repleted for hypokalemia    Ortho Pager [phone]

## 2019-09-03 NOTE — PROGRESS NOTE ADULT - SUBJECTIVE AND OBJECTIVE BOX
Pain Management Progress Note - [facility] [phone]    HPI: Patient seen during morning rounds, in NAD. States pain is uncontrolled and states tramadol has not been effective for her during this admission. Patient states she usually does not prefer to take any opioid medications but is willing to "try something stronger" due to increased pain. States pain is worse to L side of face since halo has been placed, B/L sides of upper back, and midline low back pain. States she feels otherwise ok today.      Pertinent PMH: Pain at: _x__Back _x__Neck___Knee ___Hip ___Shoulder ___ Opioid tolerance    Pain is __x_ sharp ____dull __x_burning ___achy ___ Intensity: ____ mild ____mod __x__severe     Location ___x__surgical site __x___cervical __x___lumbar ____abd _____upper ext____lower ext    Worse with ___x_activity _x___movement _____physical therapy___ Rest    Improved with _x___medication __x__rest ____physical therapy      BUpivacaine liposome 1.3% Injectable (no eMAR)  metoprolol succinate ER  levothyroxine  lactated ringers.  acetaminophen   Tablet ..  HYDROmorphone   Tablet  morphine IVPB  ceFAZolin   IVPB  diazepam    Tablet  docusate sodium  senna  HYDROmorphone  Injectable  LORazepam   Injectable  traMADol  BACItracin   Ointment  potassium chloride   Powder  acetylcysteine 20%  Inhalation  sodium chloride 0.9%.  acetylcysteine 20%  Inhalation  benzocaine 15 mG/menthol 3.6 mG Lozenge  potassium chloride   Powder  cyclobenzaprine  acetaminophen   Tablet ..  HYDROmorphone   Tablet      ROS: Const:  __-_febrile   Eyes:___ENT:___CV: _-__chest pain  Resp: __-__sob  GI:___nausea ___vomiting __-__abd pain ___npo ___clears ___full diet __bm  :___ Musk: __x_pain _x__spasm  Skin:___ Neuro:  ___sedation___confusion____ numbness ___weakness ___paresthesia  Psych:___anxiety  Endo:___ Heme:___Allergy:___  __x__ all other systems reviewed and negative       09-03 @ 06:57613 mL/min/1.73M2    09-02 @ 22:87210 mL/min/1.73M2    Hemoglobin: 10.4 g/dL (09-03 @ 06:48)  Hemoglobin: 10.6 g/dL (09-02 @ 07:11)      T(C): 36.7 (09-03-19 @ 08:34), Max: 36.7 (09-02-19 @ 15:31)  HR: 78 (09-03-19 @ 08:34) (78 - 91)  BP: 120/81 (09-03-19 @ 08:34) (107/71 - 126/87)  RR: 16 (09-03-19 @ 08:34) (16 - 16)  SpO2: 100% (09-03-19 @ 08:34) (98% - 100%)  Wt(kg): --     PHYSICAL EXAM:  Gen Appearance: __x_no acute distress _x__appropriate       Neuro: __x_SILT feet____ EOM Intact Psych: AAOX_3_, __x_mood/affect appropriate        Eyes: __x_conjunctiva WNL  _____ Pupils equal and round        ENT: __x_ears and nose atraumatic__x_ Hearing grossly intact        Neck: _x__trachea midline, no visible masses ___thyroid without palpable mass    Resp: __x_Nml WOB____No tactile fremitus ___clear to auscultation    Cardio: _x__extremities free from edema _x___pedal pulses palpable    GI/Abdomen: ___soft __x__ Nontender___x___Nondistended_____HSM    Lymphatic: ___no palpable nodes in neck  ___no palpable nodes calves and feet    Skin/Wound: ___Incision, _x__Dressing c/d/i,   ____surrounding tissues soft,  ___drain/chest tube present____    Muscular: EHL __5_/5  Gastrocnemius_5__/5    __x_absent clubbing/cyanosis         ASSESSMENT:  This is a 63y old Female with a history of:  M48.02  Handoff  MEWS Score  H/O: hypothyroidism  Cervical stenosis of spine  Cervical vertebral fusion  Cervical spinal stenosis  Fusion of cervical spine at 4 levels  Cervical stenosis of spine  Cough  Acute blood loss as cause of postoperative anemia  H/O: hypothyroidism  Cervical stenosis of spine  Application, halo vest  Fusion of cervical spine at 4 levels  S/P cervical spinal fusion  Surgery, elective  Surgery, elective  Surgery, elective        Recommended Treatment PLAN:    1. Tramadol d/c'd  2. Dilaudid 2-4mg PO q4 PRN moderate to severe pain started today. If patient feels that she is not tolerating Dilaudid PO well then ok to discontinue Dilaudid PO and resume Tramadol PO.  3. Continue Morphine 2mg PO q3 PRN breakthrough pain  4. Continue Flexeril 10mg PO TID PRN  Plan discussed with Dr. Parr

## 2019-09-03 NOTE — SWALLOW BEDSIDE ASSESSMENT ADULT - ASR SWALLOW ASPIRATION MONITOR
change of breathing pattern/gurgly voice/fever/pneumonia/throat clearing/upper respiratory infection/cough

## 2019-09-04 LAB
ANION GAP SERPL CALC-SCNC: 12 MMOL/L — SIGNIFICANT CHANGE UP (ref 5–17)
BUN SERPL-MCNC: 3 MG/DL — LOW (ref 7–23)
CALCIUM SERPL-MCNC: 8.5 MG/DL — SIGNIFICANT CHANGE UP (ref 8.4–10.5)
CHLORIDE SERPL-SCNC: 100 MMOL/L — SIGNIFICANT CHANGE UP (ref 96–108)
CO2 SERPL-SCNC: 23 MMOL/L — SIGNIFICANT CHANGE UP (ref 22–31)
CREAT SERPL-MCNC: 0.5 MG/DL — SIGNIFICANT CHANGE UP (ref 0.5–1.3)
GLUCOSE SERPL-MCNC: 83 MG/DL — SIGNIFICANT CHANGE UP (ref 70–99)
HCT VFR BLD CALC: 29.3 % — LOW (ref 34.5–45)
HGB BLD-MCNC: 10.1 G/DL — LOW (ref 11.5–15.5)
MCHC RBC-ENTMCNC: 31.9 PG — SIGNIFICANT CHANGE UP (ref 27–34)
MCHC RBC-ENTMCNC: 34.5 GM/DL — SIGNIFICANT CHANGE UP (ref 32–36)
MCV RBC AUTO: 92.4 FL — SIGNIFICANT CHANGE UP (ref 80–100)
NRBC # BLD: 0 /100 WBCS — SIGNIFICANT CHANGE UP (ref 0–0)
PLATELET # BLD AUTO: 389 K/UL — SIGNIFICANT CHANGE UP (ref 150–400)
POTASSIUM SERPL-MCNC: 3.7 MMOL/L — SIGNIFICANT CHANGE UP (ref 3.5–5.3)
POTASSIUM SERPL-SCNC: 3.7 MMOL/L — SIGNIFICANT CHANGE UP (ref 3.5–5.3)
RBC # BLD: 3.17 M/UL — LOW (ref 3.8–5.2)
RBC # FLD: 13.8 % — SIGNIFICANT CHANGE UP (ref 10.3–14.5)
SODIUM SERPL-SCNC: 135 MMOL/L — SIGNIFICANT CHANGE UP (ref 135–145)
WBC # BLD: 11.87 K/UL — HIGH (ref 3.8–10.5)
WBC # FLD AUTO: 11.87 K/UL — HIGH (ref 3.8–10.5)

## 2019-09-04 RX ORDER — GABAPENTIN 400 MG/1
300 CAPSULE ORAL THREE TIMES A DAY
Refills: 0 | Status: DISCONTINUED | OUTPATIENT
Start: 2019-09-04 | End: 2019-09-04

## 2019-09-04 RX ORDER — MORPHINE SULFATE 50 MG/1
15 CAPSULE, EXTENDED RELEASE ORAL EVERY 4 HOURS
Refills: 0 | Status: DISCONTINUED | OUTPATIENT
Start: 2019-09-04 | End: 2019-09-05

## 2019-09-04 RX ORDER — MORPHINE SULFATE 50 MG/1
30 CAPSULE, EXTENDED RELEASE ORAL EVERY 4 HOURS
Refills: 0 | Status: DISCONTINUED | OUTPATIENT
Start: 2019-09-04 | End: 2019-09-05

## 2019-09-04 RX ORDER — AMITRIPTYLINE HCL 25 MG
25 TABLET ORAL DAILY
Refills: 0 | Status: DISCONTINUED | OUTPATIENT
Start: 2019-09-04 | End: 2019-09-06

## 2019-09-04 RX ADMIN — CYCLOBENZAPRINE HYDROCHLORIDE 10 MILLIGRAM(S): 10 TABLET, FILM COATED ORAL at 12:21

## 2019-09-04 RX ADMIN — MORPHINE SULFATE 15 MILLIGRAM(S): 50 CAPSULE, EXTENDED RELEASE ORAL at 22:15

## 2019-09-04 RX ADMIN — MORPHINE SULFATE 15 MILLIGRAM(S): 50 CAPSULE, EXTENDED RELEASE ORAL at 15:36

## 2019-09-04 RX ADMIN — Medication 75 MICROGRAM(S): at 06:03

## 2019-09-04 RX ADMIN — MORPHINE SULFATE 15 MILLIGRAM(S): 50 CAPSULE, EXTENDED RELEASE ORAL at 21:15

## 2019-09-04 RX ADMIN — Medication 1 APPLICATION(S): at 13:42

## 2019-09-04 RX ADMIN — GABAPENTIN 300 MILLIGRAM(S): 400 CAPSULE ORAL at 13:41

## 2019-09-04 RX ADMIN — CYCLOBENZAPRINE HYDROCHLORIDE 10 MILLIGRAM(S): 10 TABLET, FILM COATED ORAL at 00:07

## 2019-09-04 RX ADMIN — MORPHINE SULFATE 15 MILLIGRAM(S): 50 CAPSULE, EXTENDED RELEASE ORAL at 14:35

## 2019-09-04 RX ADMIN — Medication 200 MILLIGRAM(S): at 06:03

## 2019-09-04 RX ADMIN — CYCLOBENZAPRINE HYDROCHLORIDE 10 MILLIGRAM(S): 10 TABLET, FILM COATED ORAL at 19:02

## 2019-09-04 NOTE — PROGRESS NOTE ADULT - SUBJECTIVE AND OBJECTIVE BOX
Orthopaedic Spine Resident Note    S: Dysphagia improved with pureed diet per SLP recs  No fevers/chills/shortness of breath/chest pain/new neurologic complaints.    Vital Signs Last 24 Hrs  T(C): 36.7 (04 Sep 2019 15:31), Max: 37.4 (03 Sep 2019 20:39)  T(F): 98.1 (04 Sep 2019 15:31), Max: 99.3 (03 Sep 2019 20:39)  HR: 99 (04 Sep 2019 15:31) (98 - 100)  BP: 120/79 (04 Sep 2019 15:31) (120/78 - 125/80)  BP(mean): --  RR: 17 (04 Sep 2019 15:31) (15 - 17)  SpO2: 100% (04 Sep 2019 15:31) (95% - 100%)    VSS  General: Well-appearing adult Female lying supine; NAD; A&Ox3  Neck: DSG CDI, collar in place  Hemovac drains x  1  Motor:  LUE- Delt/Bicep/Tricep/Wrist Ext/ 5/5 strength   RUE- Delt/Bicep/Tricep/Wrist Ext/ 5/5 strength  Sensory:  LUE- SILT C5-T1 dermatomes  RUE- SILT C5-T1 dermatomes  Vascular:  Hands WWP; Radial 2+ bilaterally; Cap refill < 2 sec      Labs:                                                                                 10.1   11.87 )-----------( 389      ( 04 Sep 2019 07:51 )             29.3           A/P: 63y Female ACDF C3-C7 8/23, C3-C7 posterior cervical fusion 8/29, application of halo vest 8/30    - Stable  - HALO collar on at all times  - Pain control  - Appreciate medicine consult  - Nausea control/Bowel regiment  - Home meds  - PT  - WBAT  - Monitor drain output   - DVT ppx: SCDs  - Dispo: home when clears PT    Ortho Pager [phone]
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
## 2019-09-04 NOTE — DIETITIAN INITIAL EVALUATION ADULT. - ADD RECOMMEND
1. Monitor PO intake 2. Honor food preferences 3. Add Ensure Enlive TID (1050kcal, 60g pro) - pt is agreeable 4. Add Multivitamin daily 4. Please co-sign malnutrition note

## 2019-09-04 NOTE — PROGRESS NOTE ADULT - SUBJECTIVE AND OBJECTIVE BOX
Ortho Note    Pt comfortable without complaints, on phone call  Denies CP, SOB, N/V, numbness/tingling     Vital Signs Last 24 Hrs  T(C): 36.2 (09-04-19 @ 05:02), Max: 36.2 (09-04-19 @ 05:02)  T(F): 97.2 (09-04-19 @ 05:02), Max: 97.2 (09-04-19 @ 05:02)  HR: 98 (09-04-19 @ 05:02) (98 - 98)  BP: 123/69 (09-04-19 @ 05:02) (123/69 - 123/69)  BP(mean): --  RR: 15 (09-04-19 @ 05:02) (15 - 15)  SpO2: 95% (09-04-19 @ 05:02) (95% - 95%)    General: Pt Alert and oriented, NAD, talking on phone  incisions c/d/i anterior/posterior neck, halo intact  Pulses intact b/l UE  Sensation intact b/l UE  Motor: /bicep/tricep 5/5 b/l                          10.1   11.87 )-----------( 389      ( 04 Sep 2019 07:51 )             29.3   04 Sep 2019 07:51    135    |  100    |  3      ----------------------------<  83     3.7     |  23     |  0.50     Ca    8.5        04 Sep 2019 07:51        A/P: 63yFemale POD#6 s/p PSF C3-7  - Stable  - Pain Control  - DVT ppx: scds  - PT, WBS: wbat    Ortho Pager [phone]
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
## 2019-09-04 NOTE — DIETITIAN INITIAL EVALUATION ADULT. - ENERGY NEEDS
ABW used to calculate energy needs due to pt's current body weight within % IBW  Needs calculated for age and increased secondary to post-op healing and concern for malnutrition

## 2019-09-04 NOTE — PROGRESS NOTE ADULT - SUBJECTIVE AND OBJECTIVE BOX
Pain Management Progress Note - [facility] [phone]      HPI: Patient seen and examined today, patient in nad. Patient with a history of hypothyroidism, and cervical stenosis of spine, s/p PSF C3-C7 POD, s/p halo application. Patient reports neck pain, surgical site pain and halo site pain and a burning sensation at halo sites, pain worsened with activity. Patient reports poor pain relief with Dilaudid PO. Patient Axox3, denies n,v no s/s of oversedation. Reviewed pain medication regimen with patient.       Pain is ___ sharp ___x_dull _x__burning _x__achy ___ Intensity: ____ mild _x__mod _x__severe     Location _x___surgical site __x__cervical _____lumbar ____abd ____upper ext____lower ext    Worse with __x__activity __x__movement _____physical therapy___ Rest    Improved with _x___medication __x__rest ____physical therapy      BUpivacaine liposome 1.3% Injectable (no eMAR)  metoprolol succinate ER  levothyroxine  lactated ringers.  acetaminophen   Tablet ..  HYDROmorphone   Tablet  morphine IVPB  ceFAZolin   IVPB  diazepam    Tablet  docusate sodium  senna  HYDROmorphone  Injectable  LORazepam   Injectable  LORazepam   Injectable  ceFAZolin  Injectable.  traMADol  traMADol  morphine  - Injectable  cyclobenzaprine  clonazePAM  Tablet  traMADol  traMADol  BACItracin   Ointment  potassium chloride   Powder  acetylcysteine 20%  Inhalation  sodium chloride 0.9%.  acetylcysteine 20%  Inhalation  benzocaine 15 mG/menthol 3.6 mG Lozenge  potassium chloride   Powder  potassium chloride   Powder  cyclobenzaprine  acetaminophen   Tablet ..  HYDROmorphone   Tablet  HYDROmorphone   Tablet      ROS: Const:  __-_febrile   Eyes:___ENT:___CV: _-__chest pain  Resp: __-__sob  GI:_-__nausea _-__vomiting _-__abd pain ___npo ___clears _x_full diet __bm  :___ Musk: __x_pain _x__spasm  Skin:___ Neuro:  __-_awqwfkwn_-__djuhitxyv__-_ numbness _x__weakness _x__paresth  Psych:__anxiety  Endo:___ Heme:___Allergy:_________, __x_all others reviewed and negative      PAST MEDICAL & SURGICAL HISTORY:  H/O: hypothyroidism  Cervical stenosis of spine  S/P cervical spinal fusion: ACDF C3-7  Surgery, elective: infertility procedures  Surgery, elective: shoulder -right x 3  Surgery, elective: left foot x 6 surgeries      09-04 @ 07:02283 mL/min/1.73M2          Hemoglobin: 10.1 g/dL (09-04 @ 07:51)  Hemoglobin: 10.4 g/dL (09-03 @ 06:48)        T(C): 36.2 (09-04-19 @ 05:02), Max: 37.4 (09-03-19 @ 20:39)  HR: 98 (09-04-19 @ 05:02) (98 - 100)  BP: 123/69 (09-04-19 @ 05:02) (122/82 - 125/80)  RR: 15 (09-04-19 @ 05:02) (15 - 16)  SpO2: 95% (09-04-19 @ 05:02) (95% - 98%)  Wt(kg): --       PHYSICAL EXAM:  Gen Appearance: x___no acute distress _x__appropriate        Neuro: _x__SILT feet____ EOM Intact Psych: AAOX_3_, x___mood/affect appropriate        Eyes: _x__conjunctiva WNL  ___x__ Pupils equal and round        ENT: x___ears and nose atraumatic__x_ Hearing grossly intact        Neck: _x__trachea midline, no visible masses ___thyroid without palpable mass    Resp: _x__Nml WOB____No tactile fremitus ___clear to auscultation    Cardio: __x_extremities free from edema __x__pedal pulses palpable    GI/Abdomen: _x__soft __x___ Nontender___x___Nondistended_____HSM    Lymphatic: ___no palpable nodes in neck  ___no palpable nodes calves and feet    Skin/Wound: ___Incision, _x__Dressing c/d/i,   ____surrounding tissues soft,  ___drain/chest tube present____    Muscular: EHL _5__/5  Gastrocnemius_5__/5    ___absent clubbing/cyanosis        ASSESSMENT: This is a 63y old Female with a history of hypothyroidism,  and cervical stenosis of spine, s/p PSF C3-C7, s/p halo application, pain not well controlled with current pain medication regimen      Recommended Treatment PLAN:  1. Morphine 15-30mg PO Q4h prn moderate to severe pain  2. Morphine 2mg Q2h IVP prn breakthrough pain  3. Flexeril 5mg Po Q8h prn muscle spasms  4. Scopolamine patch   5. gabapentin 300mg PO TID  Plan discussed with Dr. Parr Pain Management Progress Note - [facility] [phone]      HPI: Patient seen and examined today, patient in nad. Patient with a history of hypothyroidism, and cervical stenosis of spine, s/p PSF C3-C7 POD, s/p halo application. Patient reports neck pain, surgical site pain and halo site pain and a burning sensation at halo sites, pain worsened with activity. Patient reports poor pain relief with Dilaudid PO. Patient Axox3, denies n,v no s/s of oversedation. Reviewed pain medication regimen with patient.       Pain is ___ sharp ___x_dull _x__burning _x__achy ___ Intensity: ____ mild _x__mod _x__severe     Location _x___surgical site __x__cervical _____lumbar ____abd ____upper ext____lower ext    Worse with __x__activity __x__movement _____physical therapy___ Rest    Improved with _x___medication __x__rest ____physical therapy      BUpivacaine liposome 1.3% Injectable (no eMAR)  metoprolol succinate ER  levothyroxine  lactated ringers.  acetaminophen   Tablet ..  HYDROmorphone   Tablet  morphine IVPB  ceFAZolin   IVPB  diazepam    Tablet  docusate sodium  senna  HYDROmorphone  Injectable  LORazepam   Injectable  LORazepam   Injectable  ceFAZolin  Injectable.  traMADol  traMADol  morphine  - Injectable  cyclobenzaprine  clonazePAM  Tablet  traMADol  traMADol  BACItracin   Ointment  potassium chloride   Powder  acetylcysteine 20%  Inhalation  sodium chloride 0.9%.  acetylcysteine 20%  Inhalation  benzocaine 15 mG/menthol 3.6 mG Lozenge  potassium chloride   Powder  potassium chloride   Powder  cyclobenzaprine  acetaminophen   Tablet ..  HYDROmorphone   Tablet  HYDROmorphone   Tablet      ROS: Const:  __-_febrile   Eyes:___ENT:___CV: _-__chest pain  Resp: __-__sob  GI:_-__nausea _-__vomiting _-__abd pain ___npo ___clears _x_full diet __bm  :___ Musk: __x_pain _x__spasm  Skin:___ Neuro:  __-_bhfkmups_-__umcmqpnsw__-_ numbness _x__weakness _x__paresth  Psych:__anxiety  Endo:___ Heme:___Allergy:_________, __x_all others reviewed and negative      PAST MEDICAL & SURGICAL HISTORY:  H/O: hypothyroidism  Cervical stenosis of spine  S/P cervical spinal fusion: ACDF C3-7  Surgery, elective: infertility procedures  Surgery, elective: shoulder -right x 3  Surgery, elective: left foot x 6 surgeries      09-04 @ 07:76414 mL/min/1.73M2          Hemoglobin: 10.1 g/dL (09-04 @ 07:51)  Hemoglobin: 10.4 g/dL (09-03 @ 06:48)        T(C): 36.2 (09-04-19 @ 05:02), Max: 37.4 (09-03-19 @ 20:39)  HR: 98 (09-04-19 @ 05:02) (98 - 100)  BP: 123/69 (09-04-19 @ 05:02) (122/82 - 125/80)  RR: 15 (09-04-19 @ 05:02) (15 - 16)  SpO2: 95% (09-04-19 @ 05:02) (95% - 98%)  Wt(kg): --       PHYSICAL EXAM:  Gen Appearance: x___no acute distress _x__appropriate        Neuro: _x__SILT feet____ EOM Intact Psych: AAOX_3_, x___mood/affect appropriate        Eyes: _x__conjunctiva WNL  ___x__ Pupils equal and round        ENT: x___ears and nose atraumatic__x_ Hearing grossly intact        Neck: _x__trachea midline, no visible masses ___thyroid without palpable mass    Resp: _x__Nml WOB____No tactile fremitus ___clear to auscultation    Cardio: __x_extremities free from edema __x__pedal pulses palpable    GI/Abdomen: _x__soft __x___ Nontender___x___Nondistended_____HSM    Lymphatic: ___no palpable nodes in neck  ___no palpable nodes calves and feet    Skin/Wound: ___Incision, _x__Dressing c/d/i,   ____surrounding tissues soft,  ___drain/chest tube present____    Muscular: EHL _5__/5  Gastrocnemius_5__/5    ___absent clubbing/cyanosis        ASSESSMENT: This is a 63y old Female with a history of hypothyroidism,  and cervical stenosis of spine, s/p PSF C3-C7, s/p halo application, pain not well controlled with current pain medication regimen      Recommended Treatment PLAN:  1. Morphine 15-30mg PO Q4h prn moderate to severe pain  2. Morphine 2mg Q2h IVP prn breakthrough pain  3. Flexeril 10mg Po Q8h prn muscle spasms  4. Scopolamine patch   5. gabapentin 300mg PO TID  Plan discussed with Dr. Parr Pain Management Progress Note - [facility] [phone]      HPI: Patient seen and examined today, patient in nad. Patient with a history of hypothyroidism, and cervical stenosis of spine, s/p PSF C3-C7 POD, s/p halo application. Patient reports neck pain, surgical site pain and halo site pain and a burning sensation at halo sites, pain worsened with activity. Patient reports poor pain relief with Dilaudid PO. Patient Axox3, denies n,v no s/s of oversedation. Reviewed pain medication regimen with patient.       Pain is ___ sharp ___x_dull _x__burning _x__achy ___ Intensity: ____ mild _x__mod _x__severe     Location _x___surgical site __x__cervical _____lumbar ____abd ____upper ext____lower ext    Worse with __x__activity __x__movement _____physical therapy___ Rest    Improved with _x___medication __x__rest ____physical therapy      BUpivacaine liposome 1.3% Injectable (no eMAR)  metoprolol succinate ER  levothyroxine  lactated ringers.  acetaminophen   Tablet ..  HYDROmorphone   Tablet  morphine IVPB  ceFAZolin   IVPB  diazepam    Tablet  docusate sodium  senna  HYDROmorphone  Injectable  LORazepam   Injectable  LORazepam   Injectable  ceFAZolin  Injectable.  traMADol  traMADol  morphine  - Injectable  cyclobenzaprine  clonazePAM  Tablet  traMADol  traMADol  BACItracin   Ointment  potassium chloride   Powder  acetylcysteine 20%  Inhalation  sodium chloride 0.9%.  acetylcysteine 20%  Inhalation  benzocaine 15 mG/menthol 3.6 mG Lozenge  potassium chloride   Powder  potassium chloride   Powder  cyclobenzaprine  acetaminophen   Tablet ..  HYDROmorphone   Tablet  HYDROmorphone   Tablet      ROS: Const:  __-_febrile   Eyes:___ENT:___CV: _-__chest pain  Resp: __-__sob  GI:_-__nausea _-__vomiting _-__abd pain ___npo ___clears _x_full diet __bm  :___ Musk: __x_pain _x__spasm  Skin:___ Neuro:  __-_kfqoazoh_-__crfbylyjq__-_ numbness _x__weakness _x__paresth  Psych:__anxiety  Endo:___ Heme:___Allergy:_________, __x_all others reviewed and negative      PAST MEDICAL & SURGICAL HISTORY:  H/O: hypothyroidism  Cervical stenosis of spine  S/P cervical spinal fusion: ACDF C3-7  Surgery, elective: infertility procedures  Surgery, elective: shoulder -right x 3  Surgery, elective: left foot x 6 surgeries      09-04 @ 07:73524 mL/min/1.73M2          Hemoglobin: 10.1 g/dL (09-04 @ 07:51)  Hemoglobin: 10.4 g/dL (09-03 @ 06:48)        T(C): 36.2 (09-04-19 @ 05:02), Max: 37.4 (09-03-19 @ 20:39)  HR: 98 (09-04-19 @ 05:02) (98 - 100)  BP: 123/69 (09-04-19 @ 05:02) (122/82 - 125/80)  RR: 15 (09-04-19 @ 05:02) (15 - 16)  SpO2: 95% (09-04-19 @ 05:02) (95% - 98%)  Wt(kg): --       PHYSICAL EXAM:  Gen Appearance: x___no acute distress _x__appropriate        Neuro: _x__SILT feet____ EOM Intact Psych: AAOX_3_, x___mood/affect appropriate        Eyes: _x__conjunctiva WNL  ___x__ Pupils equal and round        ENT: x___ears and nose atraumatic__x_ Hearing grossly intact        Neck: _x__trachea midline, no visible masses ___thyroid without palpable mass    Resp: _x__Nml WOB____No tactile fremitus ___clear to auscultation    Cardio: __x_extremities free from edema __x__pedal pulses palpable    GI/Abdomen: _x__soft __x___ Nontender___x___Nondistended_____HSM    Lymphatic: ___no palpable nodes in neck  ___no palpable nodes calves and feet    Skin/Wound: ___Incision, _x__Dressing c/d/i,   ____surrounding tissues soft,  ___drain/chest tube present____    Muscular: EHL _5__/5  Gastrocnemius_5__/5    ___absent clubbing/cyanosis        ASSESSMENT: This is a 63y old Female with a history of hypothyroidism,  and cervical stenosis of spine, s/p PSF C3-C7, s/p halo application, pain not well controlled with current pain medication regimen      Recommended Treatment PLAN:  1. Morphine 15-30mg PO Q4h prn moderate to severe pain  2. Morphine 2mg Q2h IVP prn breakthrough pain  3. Flexeril 10mg Po Q8h prn muscle spasms  4. Scopolamine patch   5. gabapentin 300mg PO TID  Plan discussed with Dr. Parr      Addendum: Patient seen and examined on rounds with Dr. Parr. Reviewed pain medication regimen with patient and Dr. Parr at bedside.   1. D/c Gabapentin Po  2. Amitriptyline 25mg PO daily.   Plan discussed with Dr. Parr

## 2019-09-05 DIAGNOSIS — F41.1 GENERALIZED ANXIETY DISORDER: ICD-10-CM

## 2019-09-05 RX ORDER — CYCLOBENZAPRINE HYDROCHLORIDE 10 MG/1
5 TABLET, FILM COATED ORAL EVERY 8 HOURS
Refills: 0 | Status: DISCONTINUED | OUTPATIENT
Start: 2019-09-05 | End: 2019-09-06

## 2019-09-05 RX ORDER — NALOXONE HYDROCHLORIDE 4 MG/.1ML
0.4 SPRAY NASAL ONCE
Refills: 0 | Status: COMPLETED | OUTPATIENT
Start: 2019-09-05 | End: 2019-09-05

## 2019-09-05 RX ORDER — PANTOPRAZOLE SODIUM 20 MG/1
40 TABLET, DELAYED RELEASE ORAL
Refills: 0 | Status: DISCONTINUED | OUTPATIENT
Start: 2019-09-05 | End: 2019-09-06

## 2019-09-05 RX ORDER — TRAMADOL HYDROCHLORIDE 50 MG/1
50 TABLET ORAL EVERY 4 HOURS
Refills: 0 | Status: DISCONTINUED | OUTPATIENT
Start: 2019-09-05 | End: 2019-09-06

## 2019-09-05 RX ORDER — TRAMADOL HYDROCHLORIDE 50 MG/1
25 TABLET ORAL EVERY 4 HOURS
Refills: 0 | Status: DISCONTINUED | OUTPATIENT
Start: 2019-09-05 | End: 2019-09-06

## 2019-09-05 RX ADMIN — NALOXONE HYDROCHLORIDE 0.4 MILLIGRAM(S): 4 SPRAY NASAL at 12:30

## 2019-09-05 RX ADMIN — MORPHINE SULFATE 15 MILLIGRAM(S): 50 CAPSULE, EXTENDED RELEASE ORAL at 11:28

## 2019-09-05 RX ADMIN — MORPHINE SULFATE 15 MILLIGRAM(S): 50 CAPSULE, EXTENDED RELEASE ORAL at 12:28

## 2019-09-05 RX ADMIN — Medication 100 MILLIGRAM(S): at 05:13

## 2019-09-05 RX ADMIN — Medication 1 TABLET(S): at 11:28

## 2019-09-05 RX ADMIN — CYCLOBENZAPRINE HYDROCHLORIDE 5 MILLIGRAM(S): 10 TABLET, FILM COATED ORAL at 17:58

## 2019-09-05 RX ADMIN — Medication 25 MILLIGRAM(S): at 17:58

## 2019-09-05 RX ADMIN — Medication 0.5 MILLIGRAM(S): at 05:13

## 2019-09-05 RX ADMIN — CYCLOBENZAPRINE HYDROCHLORIDE 10 MILLIGRAM(S): 10 TABLET, FILM COATED ORAL at 00:13

## 2019-09-05 RX ADMIN — Medication 200 MILLIGRAM(S): at 05:13

## 2019-09-05 RX ADMIN — Medication 75 MICROGRAM(S): at 05:13

## 2019-09-05 RX ADMIN — PANTOPRAZOLE SODIUM 40 MILLIGRAM(S): 20 TABLET, DELAYED RELEASE ORAL at 17:58

## 2019-09-05 RX ADMIN — Medication 100 MILLIGRAM(S): at 11:28

## 2019-09-05 NOTE — PROGRESS NOTE ADULT - SUBJECTIVE AND OBJECTIVE BOX
HPI:  63 year old white right hand dominant female with chronic moderate neck pain. Symptoms continue despite conservative treatment.  Patient had ACDF surgery and now second stage procedure Fusion of cervical spine at 4 levels.  Drains removed and application of halo 2019. Concern for pneumonia given uptrending WBC 14.23, cough while eating but normal CXR.  Seen by pulmonary Dr. Payne and swallowing study consistent with aspiration and placed on pureed diet.      PAST MEDICAL & SURGICAL HISTORY:  H/O: hypothyroidism  Cervical stenosis of spine  S/P cervical spinal fusion: ACDF C3-7  Surgery, elective: infertility procedures  Surgery, elective: shoulder -right x 3  Surgery, elective: left foot x 6 surgeries      MEDICATIONS  (STANDING):  amitriptyline 25 milliGRAM(s) Oral daily  BACItracin   Ointment 1 Application(s) Topical daily  BUpivacaine liposome 1.3% Injectable (no eMAR) 20 milliLiter(s) Local Injection once  clonazePAM  Tablet 0.5 milliGRAM(s) Oral two times a day  docusate sodium 100 milliGRAM(s) Oral three times a day  levothyroxine 75 MICROGram(s) Oral daily  metoprolol succinate  milliGRAM(s) Oral daily  senna 2 Tablet(s) Oral at bedtime    MEDICATIONS  (PRN):  acetaminophen   Tablet .. 650 milliGRAM(s) Oral every 6 hours PRN Temp greater or equal to 38C (100.4F), Mild Pain (1 - 3)  benzocaine 15 mG/menthol 3.6 mG Lozenge 1 Lozenge Oral three times a day PRN Sore Throat  cyclobenzaprine 10 milliGRAM(s) Oral three times a day PRN Muscle Spasm  morphine  - Injectable 2 milliGRAM(s) IV Push every 3 hours PRN Breakthrough Pain  morphine  IR 15 milliGRAM(s) Oral every 4 hours PRN Moderate Pain (4 - 6)  morphine  IR 30 milliGRAM(s) Oral every 4 hours PRN Severe Pain (7 - 10)    Allergies    No Known Allergies    REVIEW OF SYSTEMS    General:  malaise	  Skin/Breast: normal  Ophthalmologic: negative  ENMT:	normal  Respiratory and Thorax: normal  Cardiovascular:	normal  Gastrointestinal:	normal  Genitourinary:	normal  Musculoskeletal:	 normal  Neurological:	normal  Psychiatric:	anxietey  Hematology/Lymphatics: negative  Endocrine:	negative  Allergic/Immunologic:	negative      PHYSICAL EXAM:  Daily Weight in k.5 (04 Sep 2019 16:30)    Vital Signs Last 24 Hrs  T(C): 36.7 (05 Sep 2019 04:53), Max: 36.8 (04 Sep 2019 20:20)  T(F): 98 (05 Sep 2019 04:53), Max: 98.3 (04 Sep 2019 20:20)  HR: 104 (05 Sep 2019 04:53) (99 - 104)  BP: 114/67 (05 Sep 2019 04:53) (114/67 - 127/82)  BP(mean): --  RR: 17 (05 Sep 2019 04:53) (17 - 17)  SpO2: 97% (05 Sep 2019 04:53) (95% - 100%)    Constitutional: WDWNWF  Eyes: conj pale  ENMT: negative  Neck: decreased ROM c spine in Halo vest  Breasts: not examined   Back: negative  Respiratory: clear to P&A  Cardiovascular: no MRGT or H  Gastrointestinal: normal bowel sounds  Genitourinary: neg  Rectal: not examined  Extremities: normal  Vascular: normal  Neurological: normal  Skin: negative  Lymph Nodes: negative  Musculoskeletal:   decreased ROM  C spine  Psychiatric: anxiety    LABS:                       10.1   11.87 )-----------( 389      ( 04 Sep 2019 07:51 )             29.3       09-04    135  |  100  |  3<L>  ----------------------------<  83  3.7   |  23  |  0.50    Ca    8.5      04 Sep 2019 07:51

## 2019-09-05 NOTE — PROGRESS NOTE ADULT - SUBJECTIVE AND OBJECTIVE BOX
Pain Management Progress Note - [facility] [phone]      HPI: Patient seen and examined today, patient in nad. Patient with a history of hypothyroidism, and cervical stenosis of spine, s/p PSF C3-C7, s/p halo application. Patient reports neck pain, surgical site pain and halo site pain and a burning sensation at halo sites, pain worsened with activity, pain managed with current pain medication regimen.  Patient Axox3, denies n,v no s/s of oversedation.       Pain is ___ sharp ___x_dull _x__burning _x__achy ___ Intensity: ____ mild _x__mod _x__severe     Location _x___surgical site __x__cervical _____lumbar ____abd ____upper ext____lower ext    Worse with __x__activity __x__movement _____physical therapy___ Rest    Improved with _x___medication __x__rest ____physical therapy      BUpivacaine liposome 1.3% Injectable (no eMAR)  metoprolol succinate ER  levothyroxine  lactated ringers.  acetaminophen   Tablet ..  HYDROmorphone   Tablet  morphine IVPB  ceFAZolin   IVPB  diazepam    Tablet  docusate sodium  senna  HYDROmorphone  Injectable  LORazepam   Injectable  LORazepam   Injectable  ceFAZolin  Injectable.  traMADol  traMADol  morphine  - Injectable  cyclobenzaprine  clonazePAM  Tablet  traMADol  traMADol  BACItracin   Ointment  potassium chloride   Powder  acetylcysteine 20%  Inhalation  sodium chloride 0.9%.  acetylcysteine 20%  Inhalation  benzocaine 15 mG/menthol 3.6 mG Lozenge  potassium chloride   Powder  potassium chloride   Powder  cyclobenzaprine  acetaminophen   Tablet ..  HYDROmorphone   Tablet  HYDROmorphone   Tablet  morphine  IR  morphine  IR  gabapentin  amitriptyline  multivitamin      ROS: Const:  __-_febrile   Eyes:___ENT:___CV: _-__chest pain  Resp: __-__sob  GI:_-__nausea _-__vomiting _-__abd pain ___npo ___clears _x_full diet __bm  :___ Musk: __x_pain _x__spasm  Skin:___ Neuro:  __-_uigczzbn_-__qrandqbdf__-_ numbness _x__weakness _x__paresth  Psych:_-_anxiety  Endo:___ Heme:___Allergy:_________, __x_all others reviewed and negative      PAST MEDICAL & SURGICAL HISTORY:  H/O: hypothyroidism  Cervical stenosis of spine  S/P cervical spinal fusion: ACDF C3-7  Surgery, elective: infertility procedures  Surgery, elective: shoulder -right x 3  Surgery, elective: left foot x 6 surgeries      Hemoglobin: 10.1 g/dL (09-04 @ 07:51)        T(C): 36.9 (09-05-19 @ 08:47), Max: 36.9 (09-05-19 @ 08:47)  HR: 96 (09-05-19 @ 08:47) (96 - 104)  BP: 108/75 (09-05-19 @ 08:47) (108/75 - 127/82)  RR: 17 (09-05-19 @ 08:47) (17 - 17)  SpO2: 100% (09-05-19 @ 08:47) (95% - 100%)  Wt(kg): --        PHYSICAL EXAM:  Gen Appearance: x___no acute distress _x__appropriate        Neuro: _x__SILT feet____ EOM Intact Psych: AAOX_3_, x___mood/affect appropriate        Eyes: _x__conjunctiva WNL  ___x__ Pupils equal and round        ENT: x___ears and nose atraumatic__x_ Hearing grossly intact        Neck: _x__trachea midline, no visible masses ___thyroid without palpable mass    Resp: _x__Nml WOB____No tactile fremitus ___clear to auscultation    Cardio: __x_extremities free from edema __x__pedal pulses palpable    GI/Abdomen: _x__soft __x___ Nontender___x___Nondistended_____HSM    Lymphatic: ___no palpable nodes in neck  ___no palpable nodes calves and feet    Skin/Wound: ___Incision, _x__Dressing c/d/i,   ____surrounding tissues soft,  ___drain/chest tube present____    Muscular: EHL _5__/5  Gastrocnemius_5__/5    ___absent clubbing/cyanosis        ASSESSMENT: This is a 63y old Female with a history of hypothyroidism,  and cervical stenosis of spine, s/p PSF C3-C7, s/p halo application, pain better controlled with current pain medication regimen,      Recommended Treatment PLAN:  1. Morphine 15-30mg PO Q4h prn moderate to severe pain  2. Morphine 2mg Q2h IVP prn breakthrough pain  3. Flexeril 10mg Po Q8h prn muscle spasms  4. Scopolamine patch   5. lyrica 25mg PO TID  Plan discussed with Dr. Ruby Pain Management Progress Note - [facility] [phone]      HPI: Patient seen and examined today, patient in nad. Patient with a history of hypothyroidism, and cervical stenosis of spine, s/p PSF C3-C7, s/p halo application. Patient reports neck pain, surgical site pain and halo site pain and a burning sensation at halo sites, pain worsened with activity, pain managed with current pain medication regimen.  Patient Axox3, denies n,v no s/s of oversedation.       Pain is ___ sharp ___x_dull _x__burning _x__achy ___ Intensity: ____ mild _x__mod _x__severe     Location _x___surgical site __x__cervical _____lumbar ____abd ____upper ext____lower ext    Worse with __x__activity __x__movement _____physical therapy___ Rest    Improved with _x___medication __x__rest ____physical therapy      BUpivacaine liposome 1.3% Injectable (no eMAR)  metoprolol succinate ER  levothyroxine  lactated ringers.  acetaminophen   Tablet ..  HYDROmorphone   Tablet  morphine IVPB  ceFAZolin   IVPB  diazepam    Tablet  docusate sodium  senna  HYDROmorphone  Injectable  LORazepam   Injectable  LORazepam   Injectable  ceFAZolin  Injectable.  traMADol  traMADol  morphine  - Injectable  cyclobenzaprine  clonazePAM  Tablet  traMADol  traMADol  BACItracin   Ointment  potassium chloride   Powder  acetylcysteine 20%  Inhalation  sodium chloride 0.9%.  acetylcysteine 20%  Inhalation  benzocaine 15 mG/menthol 3.6 mG Lozenge  potassium chloride   Powder  potassium chloride   Powder  cyclobenzaprine  acetaminophen   Tablet ..  HYDROmorphone   Tablet  HYDROmorphone   Tablet  morphine  IR  morphine  IR  gabapentin  amitriptyline  multivitamin      ROS: Const:  __-_febrile   Eyes:___ENT:___CV: _-__chest pain  Resp: __-__sob  GI:_-__nausea _-__vomiting _-__abd pain ___npo ___clears _x_full diet __bm  :___ Musk: __x_pain _x__spasm  Skin:___ Neuro:  __-_zqhlagga_-__xsytbmrbz__-_ numbness _x__weakness _x__paresth  Psych:_-_anxiety  Endo:___ Heme:___Allergy:_________, __x_all others reviewed and negative      PAST MEDICAL & SURGICAL HISTORY:  H/O: hypothyroidism  Cervical stenosis of spine  S/P cervical spinal fusion: ACDF C3-7  Surgery, elective: infertility procedures  Surgery, elective: shoulder -right x 3  Surgery, elective: left foot x 6 surgeries      Hemoglobin: 10.1 g/dL (09-04 @ 07:51)        T(C): 36.9 (09-05-19 @ 08:47), Max: 36.9 (09-05-19 @ 08:47)  HR: 96 (09-05-19 @ 08:47) (96 - 104)  BP: 108/75 (09-05-19 @ 08:47) (108/75 - 127/82)  RR: 17 (09-05-19 @ 08:47) (17 - 17)  SpO2: 100% (09-05-19 @ 08:47) (95% - 100%)  Wt(kg): --        PHYSICAL EXAM:  Gen Appearance: x___no acute distress _x__appropriate        Neuro: _x__SILT feet____ EOM Intact Psych: AAOX_3_, x___mood/affect appropriate        Eyes: _x__conjunctiva WNL  ___x__ Pupils equal and round        ENT: x___ears and nose atraumatic__x_ Hearing grossly intact        Neck: _x__trachea midline, no visible masses ___thyroid without palpable mass    Resp: _x__Nml WOB____No tactile fremitus ___clear to auscultation    Cardio: __x_extremities free from edema __x__pedal pulses palpable    GI/Abdomen: _x__soft __x___ Nontender___x___Nondistended_____HSM    Lymphatic: ___no palpable nodes in neck  ___no palpable nodes calves and feet    Skin/Wound: ___Incision, _x__Dressing c/d/i,   ____surrounding tissues soft,  ___drain/chest tube present____    Muscular: EHL _5__/5  Gastrocnemius_5__/5    ___absent clubbing/cyanosis        ASSESSMENT: This is a 63y old Female with a history of hypothyroidism,  and cervical stenosis of spine, s/p PSF C3-C7, s/p halo application, pain better controlled with current pain medication regimen,      Recommended Treatment PLAN:  1. Morphine 15-30mg PO Q4h prn moderate to severe pain  2. Morphine 2mg Q2h IVP prn breakthrough pain  3. Flexeril 10mg Po Q8h prn muscle spasms  4. Scopolamine patch   5. amitriptyline 25mg PO Daily  Plan discussed with Dr. Ruby Pain Management Progress Note - [facility] [phone]      HPI: Patient seen and examined today, patient in nad. Patient with a history of hypothyroidism, and cervical stenosis of spine, s/p PSF C3-C7, s/p halo application. Patient reports neck pain, surgical site pain and halo site pain and a burning sensation at halo sites, pain worsened with activity, pain managed with current pain medication regimen.  Patient Axox3, denies n,v no s/s of oversedation.       Pain is ___ sharp ___x_dull _x__burning _x__achy ___ Intensity: ____ mild _x__mod _x__severe     Location _x___surgical site __x__cervical _____lumbar ____abd ____upper ext____lower ext    Worse with __x__activity __x__movement _____physical therapy___ Rest    Improved with _x___medication __x__rest ____physical therapy      BUpivacaine liposome 1.3% Injectable (no eMAR)  metoprolol succinate ER  levothyroxine  lactated ringers.  acetaminophen   Tablet ..  HYDROmorphone   Tablet  morphine IVPB  ceFAZolin   IVPB  diazepam    Tablet  docusate sodium  senna  HYDROmorphone  Injectable  LORazepam   Injectable  LORazepam   Injectable  ceFAZolin  Injectable.  traMADol  traMADol  morphine  - Injectable  cyclobenzaprine  clonazePAM  Tablet  traMADol  traMADol  BACItracin   Ointment  potassium chloride   Powder  acetylcysteine 20%  Inhalation  sodium chloride 0.9%.  acetylcysteine 20%  Inhalation  benzocaine 15 mG/menthol 3.6 mG Lozenge  potassium chloride   Powder  potassium chloride   Powder  cyclobenzaprine  acetaminophen   Tablet ..  HYDROmorphone   Tablet  HYDROmorphone   Tablet  morphine  IR  morphine  IR  gabapentin  amitriptyline  multivitamin      ROS: Const:  __-_febrile   Eyes:___ENT:___CV: _-__chest pain  Resp: __-__sob  GI:_-__nausea _-__vomiting _-__abd pain ___npo ___clears _x_full diet __bm  :___ Musk: __x_pain _x__spasm  Skin:___ Neuro:  __-_gxekwnea_-__cdpkschbp__-_ numbness _x__weakness _x__paresth  Psych:_-_anxiety  Endo:___ Heme:___Allergy:_________, __x_all others reviewed and negative      PAST MEDICAL & SURGICAL HISTORY:  H/O: hypothyroidism  Cervical stenosis of spine  S/P cervical spinal fusion: ACDF C3-7  Surgery, elective: infertility procedures  Surgery, elective: shoulder -right x 3  Surgery, elective: left foot x 6 surgeries      Hemoglobin: 10.1 g/dL (09-04 @ 07:51)        T(C): 36.9 (09-05-19 @ 08:47), Max: 36.9 (09-05-19 @ 08:47)  HR: 96 (09-05-19 @ 08:47) (96 - 104)  BP: 108/75 (09-05-19 @ 08:47) (108/75 - 127/82)  RR: 17 (09-05-19 @ 08:47) (17 - 17)  SpO2: 100% (09-05-19 @ 08:47) (95% - 100%)  Wt(kg): --        PHYSICAL EXAM:  Gen Appearance: x___no acute distress _x__appropriate        Neuro: _x__SILT feet____ EOM Intact Psych: AAOX_3_, x___mood/affect appropriate        Eyes: _x__conjunctiva WNL  ___x__ Pupils equal and round        ENT: x___ears and nose atraumatic__x_ Hearing grossly intact        Neck: _x__trachea midline, no visible masses ___thyroid without palpable mass    Resp: _x__Nml WOB____No tactile fremitus ___clear to auscultation    Cardio: __x_extremities free from edema __x__pedal pulses palpable    GI/Abdomen: _x__soft __x___ Nontender___x___Nondistended_____HSM    Lymphatic: ___no palpable nodes in neck  ___no palpable nodes calves and feet    Skin/Wound: ___Incision, _x__Dressing c/d/i,   ____surrounding tissues soft,  ___drain/chest tube present____    Muscular: EHL _5__/5  Gastrocnemius_5__/5    ___absent clubbing/cyanosis        ASSESSMENT: This is a 63y old Female with a history of hypothyroidism,  and cervical stenosis of spine, s/p PSF C3-C7, s/p halo application, pain better controlled with current pain medication regimen,      Recommended Treatment PLAN:  1. Morphine 15-30mg PO Q4h prn moderate to severe pain  2. Morphine 2mg Q2h IVP prn breakthrough pain  3. Flexeril 10mg Po Q8h prn muscle spasms  4. Scopolamine patch   5. amitriptyline 25mg PO Daily  Plan discussed with Dr. Ruby at bedside

## 2019-09-05 NOTE — CONSULT NOTE ADULT - ASSESSMENT
cervical dysphagia, history of GERD with history of severe eso[phagitis and esophageal ulcers  recommend: if able to swallow pills use BID PPI  speech and swallow

## 2019-09-05 NOTE — PROGRESS NOTE ADULT - SUBJECTIVE AND OBJECTIVE BOX
Orthopaedic Spine Resident Note    S: Second SLP consult recommended nectar pureed diet. GI consult rec'd barium swallow study tomorrow  No fevers/chills/shortness of breath/chest pain/new neurologic complaints.    Vital Signs Last 24 Hrs  T(C): 36.8 (05 Sep 2019 16:34), Max: 36.9 (05 Sep 2019 08:47)  T(F): 98.2 (05 Sep 2019 16:34), Max: 98.5 (05 Sep 2019 08:47)  HR: 103 (05 Sep 2019 16:34) (96 - 104)  BP: 100/69 (05 Sep 2019 16:34) (100/69 - 127/82)  BP(mean): --  RR: 16 (05 Sep 2019 16:34) (16 - 17)  SpO2: 99% (05 Sep 2019 16:34) (95% - 100%)    VSS  General: Well-appearing adult Female lying supine; NAD; A&Ox3  Neck: DSG CDI, collar in place  Motor:  LUE- Delt/Bicep/Tricep/Wrist Ext/ 5/5 strength   RUE- Delt/Bicep/Tricep/Wrist Ext/ 5/5 strength  Sensory:  LUE- SILT C5-T1 dermatomes  RUE- SILT C5-T1 dermatomes  Vascular:  Hands WWP; Radial 2+ bilaterally; Cap refill < 2 sec      Labs:                                                                                               10.1   11.87 )-----------( 389      ( 04 Sep 2019 07:51 )             29.3           A/P: 63y Female ACDF C3-C7 8/23, C3-C7 posterior cervical fusion 8/29, application of halo vest 8/30    - Stable  - HALO collar on at all times  - Pain control  - Appreciate medicine consult  - Appreciate GI recs - barium swallow study tomorrow  - Nausea control/Bowel regiment  - Home meds  - PT  - WBAT  - DVT ppx: SCDs  - Dispo: home when clears PT    Ortho Pager [phone]

## 2019-09-05 NOTE — CONSULT NOTE ADULT - SUBJECTIVE AND OBJECTIVE BOX
HPI:  63 y.o F history of laparoscopic Nissen fundoplication 2 years ago, history of Gold's , history of severe esophagitis with ulcers maintained on pantoprazole 40 mg tid and sucralfate liquid admitted  with neck pain x chronic. Symptoms continue despite conservative treatment.  Had ACDF surgery here last week .admitted for second stage procedure -  Called to evaluate because of difficulty swallowing secretions but seems to be better as able to drink water from cup and straw.  Mixed results when she ate chicken.  Hard to ascertain info due to falling asleep during interview ? pain Rx        REVIEW OF SYSTEMS:  Constitutional: No fever, weight loss or fatigue  ENMT:  No difficulty hearing, tinnitus, vertigo; No sinus or throat pain  Respiratory: No cough, wheezing, chills or hemoptysis  Cardiovascular: No chest pain, palpitations, dizziness or leg swelling  Gastrointestinal: No abdominal or epigastric pain. No nausea, vomiting ; No constipation..  Skin: No itching, burning, rashes or lesions   Musculoskeletal: No joint pain or swelling; No muscle, back or extremity pain    PAST MEDICAL & SURGICAL HISTORY:  H/O: hypothyroidism  Cervical stenosis of spine  S/P cervical spinal fusion: ACDF C3-7  Surgery, elective: infertility procedures  Surgery, elective: shoulder -right x 3  Surgery, elective: left foot x 6 surgeries      FAMILY HISTORY:      SOCIAL HISTORY:  Smoking Status: [ ] Current, [ ] Former, [ ] Never  Pack Years:    MEDICATIONS:  MEDICATIONS  (STANDING):  amitriptyline 25 milliGRAM(s) Oral daily  BACItracin   Ointment 1 Application(s) Topical daily  BUpivacaine liposome 1.3% Injectable (no eMAR) 20 milliLiter(s) Local Injection once  clonazePAM  Tablet 0.5 milliGRAM(s) Oral two times a day  docusate sodium 100 milliGRAM(s) Oral three times a day  levothyroxine 75 MICROGram(s) Oral daily  metoprolol succinate  milliGRAM(s) Oral daily  multivitamin 1 Tablet(s) Oral daily  senna 2 Tablet(s) Oral at bedtime    MEDICATIONS  (PRN):  acetaminophen   Tablet .. 650 milliGRAM(s) Oral every 6 hours PRN Temp greater or equal to 38C (100.4F), Mild Pain (1 - 3)  benzocaine 15 mG/menthol 3.6 mG Lozenge 1 Lozenge Oral three times a day PRN Sore Throat  cyclobenzaprine 10 milliGRAM(s) Oral three times a day PRN Muscle Spasm  morphine  - Injectable 2 milliGRAM(s) IV Push every 3 hours PRN Breakthrough Pain  morphine  IR 15 milliGRAM(s) Oral every 4 hours PRN Moderate Pain (4 - 6)  morphine  IR 30 milliGRAM(s) Oral every 4 hours PRN Severe Pain (7 - 10)      Allergies    No Known Allergies    Intolerances        Vital Signs Last 24 Hrs  T(C): 36.9 (05 Sep 2019 08:47), Max: 36.9 (05 Sep 2019 08:47)  T(F): 98.5 (05 Sep 2019 08:47), Max: 98.5 (05 Sep 2019 08:47)  HR: 96 (05 Sep 2019 08:47) (96 - 104)  BP: 108/75 (05 Sep 2019 08:47) (108/75 - 127/82)  BP(mean): --  RR: 17 (05 Sep 2019 08:47) (17 - 17)  SpO2: 100% (05 Sep 2019 08:47) (95% - 100%)        PHYSICAL EXAM:    General: Halo on, ; in no acute distress  HEENT: MMM, conjunctiva and sclera clear  Gastrointestinal: Soft, non-tender non-distended; Normal bowel sounds; No rebound or guarding  Extremities: Normal range of motion, No clubbing, cyanosis or edema  Neurological: Alert and oriented x3  Skin: Warm and dry. No obvious rash      LABS:                        10.1   11.87 )-----------( 389      ( 04 Sep 2019 07:51 )             29.3     09-04    135  |  100  |  3<L>  ----------------------------<  83  3.7   |  23  |  0.50    Ca    8.5      04 Sep 2019 07:51            RADIOLOGY & ADDITIONAL STUDIES:

## 2019-09-05 NOTE — PROGRESS NOTE ADULT - SUBJECTIVE AND OBJECTIVE BOX
ORTHO NOTE    [x ] Pt seen/examined at 9:30am and in the afternoon  [ ] Pt without any complaints/in NAD.    [x ] Pt complains of: itching from brace      ROS: [ ] Fever  [ ] Chills  [ ] CP [ ] SOB [ ] Dysnea  [ ] Palpitations [ ] Cough [ ] N/V/C/D [ ] Paresthia [ ] Other     [x ] ROS  otherwise negative    .    PHYSICAL EXAM:    Vital Signs Last 24 Hrs  T(C): 36.9 (05 Sep 2019 08:47), Max: 36.9 (05 Sep 2019 08:47)  T(F): 98.5 (05 Sep 2019 08:47), Max: 98.5 (05 Sep 2019 08:47)  HR: 96 (05 Sep 2019 08:47) (96 - 104)  BP: 108/75 (05 Sep 2019 08:47) (108/75 - 127/82)  BP(mean): --  RR: 17 (05 Sep 2019 08:47) (17 - 17)  SpO2: 100% (05 Sep 2019 08:47) (95% - 100%)    I&O's Detail       CAPILLARY BLOOD GLUCOSE                      Neuro: AAOx3    Lungs: nonlabored, Spo2 wnl on RA    CV:    ABD: soft, nontender    Ext:  posterior incisional prineo dressing cdi; halo intact  Pulses intact b/l UE  Sensation intact b/l UE  Motor: /bicep/tricep 5/5 b/l    LABS                        10.1   11.87 )-----------( 389      ( 04 Sep 2019 07:51 )             29.3                                09-04    135  |  100  |  3<L>  ----------------------------<  83  3.7   |  23  |  0.50    Ca    8.5      04 Sep 2019 07:51        [ ] Other Labs  [ ] None ordered            Please check or Lime when present:  •  Heart Failure:    [ ] Acute        [ ]  Acute on Chronic        [ ] Chronic         [ ] Diastolic     [ ]  Combined    •  SHANELL:     [ ] ATN        [ ]  Renal medullary necrosis       [ ]  Renal cortical necrosis                  [ ] Other pathological Lesion:  •  CKD:  [ ] Stage I   [ ] Stage II  [ ] Stage III    [ ]Stage IV   [ ]  CKD V   [ ]  Other/Unspecified:    •  Abdominal Nutritional Status:   [ ] Malnutrition-See Nutrition note    [ ] Cachexia   [ ]  Other        [ ] Supplement ordered:            [ ] Morbid Obesity: BMI >=40         ASSESSMENT/PLAN:      STATUS POST: posterior cervical fusion C3-C7  pain control per PM  bowel regimen  pin care daily  addendum: after administration of morphine 15mg po patient's Spo2 81% and reported to appear sedated.  narcan 0.4mg IV x1 dose and responded appropriately.  No respiratory distress VSS.  pain management adjusted to acetaminophen prn mild pain, tramadol 25mg q 4 prn moderate pain (4-6), tramadol 50mg q 4 prn severe pain (7-10).  Dr. Ricardo notified and approves applying gown under brace for comfort.  If patient flies to [facility] on Sunday, Dr. Ricardo recommends ASA 325mg po daily  dysphagia- GI recommends PPI bid.  speech pathology noted frequent coughing recommends puree diet with nectar thickened liquids and modified barium swallow test    CONTINUE:          [ ] PT- wbat with walker, spinal precautions    [ ] DVT PPX- scd    [ ] Pain Mgt- see above    [ ] Dispo plan- home

## 2019-09-06 VITALS
HEART RATE: 100 BPM | SYSTOLIC BLOOD PRESSURE: 99 MMHG | TEMPERATURE: 99 F | RESPIRATION RATE: 17 BRPM | OXYGEN SATURATION: 99 % | DIASTOLIC BLOOD PRESSURE: 67 MMHG

## 2019-09-06 DIAGNOSIS — K21.9 GASTRO-ESOPHAGEAL REFLUX DISEASE WITHOUT ESOPHAGITIS: ICD-10-CM

## 2019-09-06 LAB
ANION GAP SERPL CALC-SCNC: 10 MMOL/L — SIGNIFICANT CHANGE UP (ref 5–17)
BUN SERPL-MCNC: 5 MG/DL — LOW (ref 7–23)
CALCIUM SERPL-MCNC: 9.1 MG/DL — SIGNIFICANT CHANGE UP (ref 8.4–10.5)
CHLORIDE SERPL-SCNC: 96 MMOL/L — SIGNIFICANT CHANGE UP (ref 96–108)
CO2 SERPL-SCNC: 31 MMOL/L — SIGNIFICANT CHANGE UP (ref 22–31)
CREAT SERPL-MCNC: 0.65 MG/DL — SIGNIFICANT CHANGE UP (ref 0.5–1.3)
GLUCOSE SERPL-MCNC: 87 MG/DL — SIGNIFICANT CHANGE UP (ref 70–99)
HCT VFR BLD CALC: 30.7 % — LOW (ref 34.5–45)
HGB BLD-MCNC: 10.1 G/DL — LOW (ref 11.5–15.5)
MCHC RBC-ENTMCNC: 31.9 PG — SIGNIFICANT CHANGE UP (ref 27–34)
MCHC RBC-ENTMCNC: 32.9 GM/DL — SIGNIFICANT CHANGE UP (ref 32–36)
MCV RBC AUTO: 96.8 FL — SIGNIFICANT CHANGE UP (ref 80–100)
NRBC # BLD: 0 /100 WBCS — SIGNIFICANT CHANGE UP (ref 0–0)
PLATELET # BLD AUTO: 460 K/UL — HIGH (ref 150–400)
POTASSIUM SERPL-MCNC: 4.3 MMOL/L — SIGNIFICANT CHANGE UP (ref 3.5–5.3)
POTASSIUM SERPL-SCNC: 4.3 MMOL/L — SIGNIFICANT CHANGE UP (ref 3.5–5.3)
RBC # BLD: 3.17 M/UL — LOW (ref 3.8–5.2)
RBC # FLD: 14.1 % — SIGNIFICANT CHANGE UP (ref 10.3–14.5)
SODIUM SERPL-SCNC: 137 MMOL/L — SIGNIFICANT CHANGE UP (ref 135–145)
WBC # BLD: 7.81 K/UL — SIGNIFICANT CHANGE UP (ref 3.8–10.5)
WBC # FLD AUTO: 7.81 K/UL — SIGNIFICANT CHANGE UP (ref 3.8–10.5)

## 2019-09-06 PROCEDURE — 97535 SELF CARE MNGMENT TRAINING: CPT

## 2019-09-06 PROCEDURE — C1713: CPT

## 2019-09-06 PROCEDURE — 72040 X-RAY EXAM NECK SPINE 2-3 VW: CPT

## 2019-09-06 PROCEDURE — 84550 ASSAY OF BLOOD/URIC ACID: CPT

## 2019-09-06 PROCEDURE — 95940 IONM IN OPERATNG ROOM 15 MIN: CPT

## 2019-09-06 PROCEDURE — 83930 ASSAY OF BLOOD OSMOLALITY: CPT

## 2019-09-06 PROCEDURE — 80048 BASIC METABOLIC PNL TOTAL CA: CPT

## 2019-09-06 PROCEDURE — 97530 THERAPEUTIC ACTIVITIES: CPT

## 2019-09-06 PROCEDURE — 97161 PT EVAL LOW COMPLEX 20 MIN: CPT

## 2019-09-06 PROCEDURE — 97162 PT EVAL MOD COMPLEX 30 MIN: CPT

## 2019-09-06 PROCEDURE — 92610 EVALUATE SWALLOWING FUNCTION: CPT

## 2019-09-06 PROCEDURE — 92611 MOTION FLUOROSCOPY/SWALLOW: CPT

## 2019-09-06 PROCEDURE — 83935 ASSAY OF URINE OSMOLALITY: CPT

## 2019-09-06 PROCEDURE — C1889: CPT

## 2019-09-06 PROCEDURE — 74230 X-RAY XM SWLNG FUNCJ C+: CPT | Mod: 26

## 2019-09-06 PROCEDURE — 85025 COMPLETE CBC W/AUTO DIFF WBC: CPT

## 2019-09-06 PROCEDURE — 76000 FLUOROSCOPY <1 HR PHYS/QHP: CPT

## 2019-09-06 PROCEDURE — 97116 GAIT TRAINING THERAPY: CPT

## 2019-09-06 PROCEDURE — 84443 ASSAY THYROID STIM HORMONE: CPT

## 2019-09-06 PROCEDURE — 97110 THERAPEUTIC EXERCISES: CPT

## 2019-09-06 PROCEDURE — 71045 X-RAY EXAM CHEST 1 VIEW: CPT

## 2019-09-06 PROCEDURE — 36415 COLL VENOUS BLD VENIPUNCTURE: CPT

## 2019-09-06 PROCEDURE — 94640 AIRWAY INHALATION TREATMENT: CPT

## 2019-09-06 PROCEDURE — 74230 X-RAY XM SWLNG FUNCJ C+: CPT

## 2019-09-06 PROCEDURE — 85027 COMPLETE CBC AUTOMATED: CPT

## 2019-09-06 PROCEDURE — 92526 ORAL FUNCTION THERAPY: CPT

## 2019-09-06 RX ORDER — CYCLOBENZAPRINE HYDROCHLORIDE 10 MG/1
1 TABLET, FILM COATED ORAL
Qty: 21 | Refills: 0
Start: 2019-09-06 | End: 2019-09-12

## 2019-09-06 RX ORDER — BACITRACIN ZINC 500 UNIT/G
1 OINTMENT IN PACKET (EA) TOPICAL DAILY
Refills: 0 | Status: DISCONTINUED | OUTPATIENT
Start: 2019-09-06 | End: 2019-09-06

## 2019-09-06 RX ORDER — AMITRIPTYLINE HCL 25 MG
1 TABLET ORAL
Qty: 7 | Refills: 0
Start: 2019-09-06 | End: 2019-09-12

## 2019-09-06 RX ORDER — TRAMADOL HYDROCHLORIDE 50 MG/1
1 TABLET ORAL
Qty: 42 | Refills: 0
Start: 2019-09-06 | End: 2019-09-12

## 2019-09-06 RX ORDER — TRAMADOL HYDROCHLORIDE 50 MG/1
0 TABLET ORAL
Qty: 0 | Refills: 0 | DISCHARGE

## 2019-09-06 RX ORDER — CYCLOBENZAPRINE HYDROCHLORIDE 10 MG/1
50 TABLET, FILM COATED ORAL
Qty: 0 | Refills: 0 | DISCHARGE

## 2019-09-06 RX ORDER — PANTOPRAZOLE SODIUM 20 MG/1
1 TABLET, DELAYED RELEASE ORAL
Qty: 28 | Refills: 0
Start: 2019-09-06 | End: 2019-09-19

## 2019-09-06 RX ADMIN — Medication 1 APPLICATION(S): at 05:26

## 2019-09-06 RX ADMIN — Medication 75 MICROGRAM(S): at 05:26

## 2019-09-06 RX ADMIN — Medication 200 MILLIGRAM(S): at 06:50

## 2019-09-06 RX ADMIN — Medication 100 MILLIGRAM(S): at 12:06

## 2019-09-06 RX ADMIN — Medication 1 TABLET(S): at 12:06

## 2019-09-06 RX ADMIN — CYCLOBENZAPRINE HYDROCHLORIDE 5 MILLIGRAM(S): 10 TABLET, FILM COATED ORAL at 12:06

## 2019-09-06 NOTE — CONSULT NOTE ADULT - REASON FOR ADMISSION
neck pain/surgery

## 2019-09-06 NOTE — CONSULT NOTE ADULT - CONSULT REQUESTED DATE/TIME
05-Sep-2019 12:19
06-Sep-2019 12:12
29-Aug-2019 14:59
02-Sep-2019 13:50
03-Sep-2019 21:30
03-Sep-2019 06:26

## 2019-09-06 NOTE — SWALLOW VFSS/MBS ASSESSMENT ADULT - SLP GENERAL OBSERVATIONS
Awake & alert, language skills are intact at the conversational level. Voice is moderately hypophonic

## 2019-09-06 NOTE — SWALLOW VFSS/MBS ASSESSMENT ADULT - ESOPHAGEAL STAGE
A scan of the esophagus in the oblique view revealed delayed emptying of puree and barium tablet from the mid (thoracic) to the distal esophagus. Multiple sips of water helped to reduced esophageal stasis and facilitate passage of tablet into the stomach.

## 2019-09-06 NOTE — PROGRESS NOTE ADULT - SUBJECTIVE AND OBJECTIVE BOX
Pain Management Progress Note - [facility] [phone]      HPI: Patient seen and examined today, patient in nad. Patient with a history of hypothyroidism, and cervical stenosis of spine, s/p PSF C3-C7, s/p halo application. Patient reports neck pain, surgical site pain and halo site pain, pain controlled with Tramadol PO.  Patient Axox3, denies n,v no s/s of oversedation.       Pain is ___ sharp ___x_dull _x__burning _x__achy ___ Intensity: ____ mild _x__mod _x__severe     Location _x___surgical site __x__cervical _____lumbar ____abd ____upper ext____lower ext    Worse with __x__activity __x__movement _____physical therapy___ Rest    Improved with _x___medication __x__rest ____physical therapy      BUpivacaine liposome 1.3% Injectable (no eMAR)  metoprolol succinate ER  levothyroxine  lactated ringers.  acetaminophen   Tablet ..  HYDROmorphone   Tablet  morphine IVPB  ceFAZolin   IVPB  diazepam    Tablet  docusate sodium  senna  HYDROmorphone  Injectable  LORazepam   Injectable  LORazepam   Injectable  ceFAZolin  Injectable.  traMADol  traMADol  cyclobenzaprine  clonazePAM  Tablet  traMADol  traMADol  BACItracin   Ointment  potassium chloride   Powder  acetylcysteine 20%  Inhalation  sodium chloride 0.9%.  acetylcysteine 20%  Inhalation  benzocaine 15 mG/menthol 3.6 mG Lozenge  potassium chloride   Powder  potassium chloride   Powder  cyclobenzaprine  acetaminophen   Tablet ..  HYDROmorphone   Tablet  HYDROmorphone   Tablet  morphine  IR  morphine  IR  gabapentin  amitriptyline  multivitamin  naloxone Injectable  traMADol  traMADol  cyclobenzaprine  pantoprazole    Tablet      ROS: Const:  __-_febrile   Eyes:___ENT:___CV: _-__chest pain  Resp: __-__sob  GI:_-__nausea _-__vomiting _-__abd pain ___npo ___clears _x_full diet __bm  :___ Musk: __x_pain _x__spasm  Skin:___ Neuro:  __-_siwajnpu_-__mhmppxeyu__-_ numbness _x__weakness _x__paresth  Psych:_-_anxiety  Endo:___ Heme:___Allergy:_________, __x_all others reviewed and negative      PAST MEDICAL & SURGICAL HISTORY:  H/O: hypothyroidism  Cervical stenosis of spine  S/P cervical spinal fusion: ACDF C3-7  Surgery, elective: infertility procedures  Surgery, elective: shoulder -right x 3  Surgery, elective: left foot x 6 surgeries      09-06 @ 11:2194 mL/min/1.73M2      Hemoglobin: 10.1 g/dL (09-06 @ 11:21)        T(C): 37.4 (09-06-19 @ 15:48), Max: 37.4 (09-06-19 @ 15:48)  HR: 100 (09-06-19 @ 15:48) (100 - 114)  BP: 99/67 (09-06-19 @ 15:48) (99/67 - 113/79)  RR: 17 (09-06-19 @ 15:48) (16 - 17)  SpO2: 99% (09-06-19 @ 15:48) (97% - 100%)  Wt(kg): --         PHYSICAL EXAM:  Gen Appearance: x___no acute distress _x__appropriate        Neuro: _x__SILT feet____ EOM Intact Psych: AAOX_3_, x___mood/affect appropriate        Eyes: _x__conjunctiva WNL  ___x__ Pupils equal and round        ENT: x___ears and nose atraumatic__x_ Hearing grossly intact        Neck: _x__trachea midline, no visible masses ___thyroid without palpable mass    Resp: _x__Nml WOB____No tactile fremitus ___clear to auscultation    Cardio: __x_extremities free from edema __x__pedal pulses palpable    GI/Abdomen: _x__soft __x___ Nontender___x___Nondistended_____HSM    Lymphatic: ___no palpable nodes in neck  ___no palpable nodes calves and feet    Skin/Wound: ___Incision, _x__Dressing c/d/i,   ____surrounding tissues soft,  ___drain/chest tube present____    Muscular: EHL _5__/5  Gastrocnemius_5__/5    ___absent clubbing/cyanosis        ASSESSMENT: This is a 63y old Female with a history of hypothyroidism,  and cervical stenosis of spine, s/p PSF C3-C7, s/p halo application, pain better controlled with current pain medication regimen,      Recommended Treatment PLAN:  1. Continue Tramadol 25-50mg Po Q4h prn moderate to severe pain  2. Morphine 2mg Q2h IVP prn breakthrough pain  3. Flexeril 10mg Po Q8h prn muscle spasms  4. Scopolamine patch   5. amitriptyline 25mg PO Daily  Plan discussed with Dr. Ruby

## 2019-09-06 NOTE — SWALLOW VFSS/MBS ASSESSMENT ADULT - DIAGNOSTIC IMPRESSIONS
Pt p/w an essentially functional oropharyngeal swallow for soft/moist solid textures and thin liquids. She would benefit from aspiration precautions and safe feeding/eating strategies due to h/o esophageal dysphagia, current esophageal dysmotililty, and extensive hardware visualized in the cervical spine.

## 2019-09-06 NOTE — PROGRESS NOTE ADULT - PROBLEM SELECTOR PLAN 5
Discussed with house staff and nursing staff.  Pain management, PT, incentive spirometer, DVT prophylaxis, repeat labs and discharge planning.  For Barium swallow this am as per Dr. Jimmy LARA.  On nectar pureed diet.  Anticipate transfer to rehab.

## 2019-09-06 NOTE — SWALLOW VFSS/MBS ASSESSMENT ADULT - SPECIFY REASON(S)
h/o severe esophagitis, Gustavo's esophagus, hiatal hernia, s/p repair & Nissen fundoplication 2 yrs ago, chronic neck pain, s/p ACDF C3-7 on 8/23 & planned second stage PSF C3-7 on 8/29, in halo vest

## 2019-09-06 NOTE — PROGRESS NOTE ADULT - PROBLEM SELECTOR PLAN 1
Discussed with house staff and nursing staff.  Pain management, PT, incentive spirometer, DVT prophylaxis, repeat labs and discharge planning.  For transfer to rehab when medically stable

## 2019-09-06 NOTE — CONSULT NOTE ADULT - SUBJECTIVE AND OBJECTIVE BOX
Otolaryngology - Head & Neck Surgery Consult Note    HPI  63F w/ cervical spinal stenosis sp C3-C7 ACDF on 8/22/19, now sp C3-C7 posterior spinal fusion and halo placement on 8/29/19 presents with L ear pain since yesterday. Patient reports that she noticed a sharp ache like pain deep in her ear since yesterday. Pain is worsened when changing positions in bed, particularly when she strains to get from supine to seated position. She denies vertigo/tinnitus/otorrhea/hearing loss. She denies previous episodes.    ROS  Negative except as above    PAST MEDICAL & SURGICAL HISTORY:  H/O: hypothyroidism  Cervical stenosis of spine  S/P cervical spinal fusion: ACDF C3-7  Surgery, elective: infertility procedures  Surgery, elective: shoulder -right x 3  Surgery, elective: left foot x 6 surgeries      MEDICATIONS  (STANDING):  amitriptyline 25 milliGRAM(s) Oral daily  BACItracin   Ointment 1 Application(s) Topical daily  BUpivacaine liposome 1.3% Injectable (no eMAR) 20 milliLiter(s) Local Injection once  clonazePAM  Tablet 0.5 milliGRAM(s) Oral two times a day  docusate sodium 100 milliGRAM(s) Oral three times a day  levothyroxine 75 MICROGram(s) Oral daily  metoprolol succinate  milliGRAM(s) Oral daily  multivitamin 1 Tablet(s) Oral daily  pantoprazole    Tablet 40 milliGRAM(s) Oral two times a day  senna 2 Tablet(s) Oral at bedtime    MEDICATIONS  (PRN):  acetaminophen   Tablet .. 650 milliGRAM(s) Oral every 6 hours PRN Temp greater or equal to 38C (100.4F), Mild Pain (1 - 3)  benzocaine 15 mG/menthol 3.6 mG Lozenge 1 Lozenge Oral three times a day PRN Sore Throat  cyclobenzaprine 5 milliGRAM(s) Oral every 8 hours PRN Muscle Spasm  traMADol 25 milliGRAM(s) Oral every 4 hours PRN Moderate Pain (4 - 6)  traMADol 50 milliGRAM(s) Oral every 4 hours PRN Severe Pain (7 - 10)      Vital Signs Last 24 Hrs  T(C): 36.4 (06 Sep 2019 08:17), Max: 36.8 (05 Sep 2019 16:34)  T(F): 97.5 (06 Sep 2019 08:17), Max: 98.2 (05 Sep 2019 16:34)  HR: 101 (06 Sep 2019 08:17) (101 - 114)  BP: 113/79 (06 Sep 2019 08:17) (100/69 - 113/79)  BP(mean): --  RR: 17 (06 Sep 2019 08:17) (16 - 17)  SpO2: 100% (06 Sep 2019 08:17) (97% - 100%)      Physical Exam  NAD, A&Ox3  Nonlabored respirations on RA, tolerating secretions  EOMI, PERRLA BL  CNII-XII intact BL  OC/OPx: significant trismus with inability to fully visualize soft palate  Palpable click in BL TMJ  Neck supple, anterior and posterior incisions c/d/i  Halo in place  Au: no periauricular tenderness to palpation, no pain with movement of pinna, EAC patent BL scant cerumen, TM visualized and non erythematous, non bulging    A/P  63F w/ cervical spinal stenosis sp C3-C7 ACDF on 8/22/19, now sp C3-C7 posterior spinal fusion and halo placement on 8/29/19 presents with L ear pain likely referred pain 2/2 spinal surgery. Pain Management team following. Ear exam wnl. F/u prn.    Discussed with consult attending, Dr. Aguero Otolaryngology - Head & Neck Surgery Consult Note    HPI  63F w/ cervical spinal stenosis sp C3-C7 ACDF on 8/22/19, now sp C3-C7 posterior spinal fusion and halo placement on 8/29/19 presents with L ear pain since yesterday. Patient reports that she noticed a sharp ache like pain deep in her ear since yesterday. Pain is worsened when changing positions in bed, particularly when she strains to get from supine to seated position. She denies vertigo/tinnitus/otorrhea/hearing loss. She denies previous episodes.    ROS  Negative except as above    PAST MEDICAL & SURGICAL HISTORY:  H/O: hypothyroidism  Cervical stenosis of spine  S/P cervical spinal fusion: ACDF C3-7  Surgery, elective: infertility procedures  Surgery, elective: shoulder -right x 3  Surgery, elective: left foot x 6 surgeries      MEDICATIONS  (STANDING):  amitriptyline 25 milliGRAM(s) Oral daily  BACItracin   Ointment 1 Application(s) Topical daily  BUpivacaine liposome 1.3% Injectable (no eMAR) 20 milliLiter(s) Local Injection once  clonazePAM  Tablet 0.5 milliGRAM(s) Oral two times a day  docusate sodium 100 milliGRAM(s) Oral three times a day  levothyroxine 75 MICROGram(s) Oral daily  metoprolol succinate  milliGRAM(s) Oral daily  multivitamin 1 Tablet(s) Oral daily  pantoprazole    Tablet 40 milliGRAM(s) Oral two times a day  senna 2 Tablet(s) Oral at bedtime    MEDICATIONS  (PRN):  acetaminophen   Tablet .. 650 milliGRAM(s) Oral every 6 hours PRN Temp greater or equal to 38C (100.4F), Mild Pain (1 - 3)  benzocaine 15 mG/menthol 3.6 mG Lozenge 1 Lozenge Oral three times a day PRN Sore Throat  cyclobenzaprine 5 milliGRAM(s) Oral every 8 hours PRN Muscle Spasm  traMADol 25 milliGRAM(s) Oral every 4 hours PRN Moderate Pain (4 - 6)  traMADol 50 milliGRAM(s) Oral every 4 hours PRN Severe Pain (7 - 10)      Vital Signs Last 24 Hrs  T(C): 36.4 (06 Sep 2019 08:17), Max: 36.8 (05 Sep 2019 16:34)  T(F): 97.5 (06 Sep 2019 08:17), Max: 98.2 (05 Sep 2019 16:34)  HR: 101 (06 Sep 2019 08:17) (101 - 114)  BP: 113/79 (06 Sep 2019 08:17) (100/69 - 113/79)  BP(mean): --  RR: 17 (06 Sep 2019 08:17) (16 - 17)  SpO2: 100% (06 Sep 2019 08:17) (97% - 100%)      Physical Exam  NAD, A&Ox3  Nonlabored respirations on RA, tolerating secretions  EOMI, PERRLA BL  CNII-XII intact BL  OC/OPx: significant trismus with inability to fully visualize soft palate  Palpable click in BL TMJ  Neck supple, anterior and posterior incisions c/d/i  Halo in place  Au: no periauricular tenderness to palpation, no pain with movement of pinna, EAC patent BL scant cerumen, TM visualized and non erythematous, non bulging    A/P  63F w/ cervical spinal stenosis sp C3-C7 ACDF on 8/22/19, now sp C3-C7 posterior spinal fusion and halo placement on 8/29/19 presents with L ear pain likely referred pain 2/2 spinal surgery versus TMJ syndrome. 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 F/u OMFS for management of TMJ syndrome. Recommend non-narcotic pain control - Pain Management team following. Ear exam wnl. F/u prn.    Discussed with consult attending, Dr. Aguero

## 2019-09-06 NOTE — PROGRESS NOTE ADULT - SUBJECTIVE AND OBJECTIVE BOX
HPI:  63 year old white right hand dominant female with chronic moderate neck pain. Symptoms continued despite conservative treatment.  Patient had ACDF surgery and now second stage procedure Fusion of cervical spine at 4 levels.  Drains removed and application of halo 8/30/2019. Concern for pneumonia given up trending WBC 14.23, cough while eating but normal CXR.  Seen by pulmonary Dr. Payne and GI Dr Marquez and swallowing study consistent with aspiration and placed on nectar pureed diet.  For barium swallow this am.      PAST MEDICAL & SURGICAL HISTORY:  H/O: hypothyroidism  Cervical stenosis of spine  S/P cervical spinal fusion: ACDF C3-7  Surgery, elective: infertility procedures  Surgery, elective: shoulder -right x 3  Surgery, elective: left foot x 6 surgeries      MEDICATIONS  (STANDING):  amitriptyline 25 milliGRAM(s) Oral daily  BACItracin   Ointment 1 Application(s) Topical daily  BUpivacaine liposome 1.3% Injectable (no eMAR) 20 milliLiter(s) Local Injection once  clonazePAM  Tablet 0.5 milliGRAM(s) Oral two times a day  docusate sodium 100 milliGRAM(s) Oral three times a day  levothyroxine 75 MICROGram(s) Oral daily  metoprolol succinate  milliGRAM(s) Oral daily  multivitamin 1 Tablet(s) Oral daily  pantoprazole    Tablet 40 milliGRAM(s) Oral two times a day  senna 2 Tablet(s) Oral at bedtime    MEDICATIONS  (PRN):  acetaminophen   Tablet .. 650 milliGRAM(s) Oral every 6 hours PRN Temp greater or equal to 38C (100.4F), Mild Pain (1 - 3)  benzocaine 15 mG/menthol 3.6 mG Lozenge 1 Lozenge Oral three times a day PRN Sore Throat  cyclobenzaprine 5 milliGRAM(s) Oral every 8 hours PRN Muscle Spasm  traMADol 25 milliGRAM(s) Oral every 4 hours PRN Moderate Pain (4 - 6)  traMADol 50 milliGRAM(s) Oral every 4 hours PRN Severe Pain (7 - 10)    Allergies    No Known Allergies    REVIEW OF SYSTEMS    General:  malaise	  Skin/Breast: normal  Ophthalmologic: negative  ENMT:	normal  Respiratory and Thorax: normal  Cardiovascular:	normal  Gastrointestinal:	 cough with meals  Genitourinary:	normal  Musculoskeletal:	 neck pain  Neurological:	normal  Psychiatric:	anxiety  Hematology/Lymphatics:	 negative  Endocrine:	negative  Allergic/Immunologic:	negative      PHYSICAL EXAM:     Vital Signs Last 24 Hrs  T(C): 36.3 (06 Sep 2019 04:58), Max: 36.9 (05 Sep 2019 08:47)  T(F): 97.4 (06 Sep 2019 04:58), Max: 98.5 (05 Sep 2019 08:47)  HR: 108 (06 Sep 2019 04:58) (96 - 114)  BP: 105/72 (06 Sep 2019 04:58) (100/69 - 108/75)  BP(mean): --  RR: 17 (06 Sep 2019 04:58) (16 - 17)  SpO2: 100% (06 Sep 2019 04:58) (98% - 100%)    Constitutional: WDWNWF  Eyes: conj pale  ENMT: negative  Neck: decreased ROM in Halo vest  Breasts: not examined   Back: negative  Respiratory: clear to P&A  Cardiovascular: no MRGT or H  Gastrointestinal: normal bowel sounds  Genitourinary: neg  Rectal: not examined  Extremities: normal  Vascular: normal  Neurological: normal  Skin: negative  Lymph Nodes: negative  Musculoskeletal:   normal  Psychiatric: anxiety    LABS:                       10.1   11.87 )-----------( 389      ( 04 Sep 2019 07:51 )             29.3       09-04    135  |  100  |  3<L>  ----------------------------<  83  3.7   |  23  |  0.50    Ca    8.5      04 Sep 2019 07:51

## 2019-09-06 NOTE — PROGRESS NOTE ADULT - PROVIDER SPECIALTY LIST ADULT
Anesthesia
Internal Medicine
Internal Medicine
Orthopedics
Pain Medicine
Orthopedics

## 2019-09-06 NOTE — DISCHARGE NOTE NURSING/CASE MANAGEMENT/SOCIAL WORK - PATIENT PORTAL LINK FT
You can access the FollowMyHealth Patient Portal offered by [facility] by registering at the following website: [email or web address]. By joining [facility]’s FollowMyHealth portal, you will also be able to view your health information using other applications (apps) compatible with our system.

## 2019-09-06 NOTE — SWALLOW VFSS/MBS ASSESSMENT ADULT - RECOMMENDED FEEDING/EATING TECHNIQUES
allow for swallow between intakes/alternate food with liquid/maintain upright posture during/after eating for 30 mins/small sips/bites/position upright (90 degrees)

## 2019-09-06 NOTE — PROGRESS NOTE ADULT - SUBJECTIVE AND OBJECTIVE BOX
Orthopaedic Spine Resident Note    S: Second SLP consult recommended nectar pureed diet. Barium study today to evaluate dysphagia  No fevers/chills/shortness of breath/chest pain/new neurologic complaints.    Vital Signs Last 24 Hrs  T(C): 36.3 (06 Sep 2019 04:58), Max: 36.9 (05 Sep 2019 08:47)  T(F): 97.4 (06 Sep 2019 04:58), Max: 98.5 (05 Sep 2019 08:47)  HR: 108 (06 Sep 2019 04:58) (96 - 114)  BP: 105/72 (06 Sep 2019 04:58) (100/69 - 108/75)  BP(mean): --  RR: 17 (06 Sep 2019 04:58) (16 - 17)  SpO2: 100% (06 Sep 2019 04:58) (98% - 100%)    VSS  General: Well-appearing adult Female lying supine; NAD; A&Ox3  Neck: DSG CDI, collar in place  Motor:  LUE- Delt/Bicep/Tricep/Wrist Ext/ 5/5 strength   RUE- Delt/Bicep/Tricep/Wrist Ext/ 5/5 strength  Sensory:  LUE- SILT C5-T1 dermatomes  RUE- SILT C5-T1 dermatomes  Vascular:  Hands WWP; Radial 2+ bilaterally; Cap refill < 2 sec      Labs:                                                       10.1   11.87 )-----------( 389      ( 04 Sep 2019 07:51 )             29.3           A/P: 63y Female ACDF C3-C7 8/23, C3-C7 posterior cervical fusion 8/29, application of halo vest 8/30    - Stable  - HALO collar on at all times  - Pain control  - Appreciate medicine consult  - Appreciate GI recs - barium swallow study today  - Nausea control/Bowel regiment  - Home meds  - PT  - WBAT  - DVT ppx: SCDs  - Dispo: home when clears PT    Ortho Pager [phone]

## 2019-09-06 NOTE — SWALLOW VFSS/MBS ASSESSMENT ADULT - SLP PERTINENT HISTORY OF CURRENT PROBLEM
Post-op dysphagia, worse for solids than liquids, although also noted to cough with thins on bedside exam.

## 2019-09-06 NOTE — PROGRESS NOTE ADULT - REASON FOR ADMISSION
neck pain/surgery

## 2019-12-10 NOTE — DISCHARGE NOTE NURSING/CASE MANAGEMENT/SOCIAL WORK - NSTRANSFERBELONGINGSDISPO_GEN_A_NUR
Joesph called to set up an appt for labs that he says that he is due for, he has labs set up for 12/13/19.   He said that he is due for HGB A1C and Lipid
Lab already in the system    Future Appointments   Date Time Provider Oren Arteaga   12/13/2019  8:00 AM REF [facility] REF [facility] Ref Lab [place]   12/16/2019  8:30 AM Amna Garcia MD [facility] [place]
given to family

## 2020-01-02 NOTE — OCCUPATIONAL THERAPY INITIAL EVALUATION ADULT - BED MOBILITY/TRANSFERS, PREVIOUS LEVEL OF FUNCTION, OT EVAL
[facility]  Chemotherapy Treatment  Date:  1/2/2020    SUBJECTIVE  Naina is a 79 year old female in for cycle 10 day 15 for possible chemotherapy    1. Encounter for antineoplastic chemotherapy    2. Multiple myeloma not having achieved remission (CMS/[facility])    .    Naina arrives ambulatory for today's injection. Per [employer] she currently has a cold but denies any fevers. Instructed patient to monitor for signs of infection or illness. Candie verbalized understanding of information given. Per Candie she denies any constipation or diarrhea.   Naina indicates that she is not having constipation  Nathana indicates that she is not having any pain    OBJECTIVE:   Vitals: 148/62, 18, 96.9, 77.     Medications Reviewed: Yes  Component      Latest Ref Rng & Units 1/2/2020   WHITE BLOOD CELL COUNT      4.0 - 10.0 10*3/uL 8.2   RBC      3.70 - 5.20 10*6/uL 2.32 (L)   HGB      11.2 - 15.7 g/dL 8.4 (L)   HCT      34.0 - 45.0 % 24.7 (L)   MCV      79.0 - 95.0 fL 106.5 (H)   MCH      27.0 - 34.0 pg 36.2 (H)   MCHC      32.0 - 36.0 % 34.0   PLATELET COUNT      150 - 400 10*3/uL 195   MPV      8.6 - 12.4 fL 11.4   RDW-CV      11.3 - 14.8 % 12.5   Neutrophil      34.0 - 73.5 % 96.3 (H)   Absolute Neutrophil      1.4 - 6.5 10*3/uL 7.9 (H)   LYMPH      20.5 - 51.1 % 2.4 (L)   Absolute Lymph      1.2 - 3.4 10*3/uL 0.2 (L)   MID (MONO,EO&BASO)      4.3 - 12.9 % 1.3 (L)   Absolute Mid      0.2 - 0.9 10*3/uL 0.1 (L)   DIFFERENTIAL TYPE       AUTO DIFF   Labs reviewed with patient at chairside. Patient due for xgeva today Last BMP on 12/26/19 indicates calcium level of 9.3. Candie denies any dental work or issues at this time.  Patients blood sugar noted to be808 after patient left after injections. Spoke with patients daughter and instructed her to take her mother to ER for evaluation. Eloy verbalized understanding. Dr. Gudino aware and instructed patient to be evaluated.    Distress Screening:  Scale Rating of 0=No Distress  and 10=Extreme Distress  Patient self-reported distress rating of: 0    Over the past 2 weeks, has patient felt down, depressed or hopeless? no  Over the past 2 weeks, has patient felt little interest or pleasure in doing things? no    On the basis of the above screen, the following is initiated: normal screening, continue to monitor for symptoms over time     Performance Status: 0    General: alert and orientated x 3  Skin: dry and warm      ASSESSMENT  The patient is clinically stable and the access site is normal and functioning well.   Patients wellbeing: within normal limits     PLAN  Proceed with chemotherapy with no change in dose - see doses below    CHEMOTHERAPY ADMINISTRATION  Access Prep: tubing cleaned with alcohol and medication for infusion connected to tubing aseptically  Flush: Flushed easily with 10 mLs of normal saline with good blood return    Medications:  Medication order verified: signed consent, dose calculation, drug regimen, treatment date and labs are within acceptable parameters  Medications were prepared by the [employer]  Chair-side check and pump rate verified by myself and Elodia Westbrook RN  These medications were administered sequentially:      Chemotherapy Medication  Velcade (Bortezomib)   2 mg   Waste 1.5 mg SubQ diluted per  Given at 1605  Given in: Left   upper arm         Injectable Medication  Xgeva (Denosumab)    120 mg   Waste none SubQ diluted per  Given at: 1600 Given in: Right  upper         Side Effects Noted: none    Ermalina tolerated the treatment well.  She was advised on follow up and to call as needed.    Signed: Gillian Vincent RN       independent

## 2020-01-17 NOTE — PATIENT PROFILE ADULT - NSPROIMPLANTSMEDDEV_GEN_A_NUR
Patient has no known allergies. Physical Exam         VS:  /62   Pulse 60   Temp 97.9 °F (36.6 °C) (Temporal)   Resp 16   Ht 5' 7\" (1.702 m)   Wt 144 lb (65.3 kg)   SpO2 98%   BMI 22.55 kg/m²    Oxygen Saturation Interpretation: Normal.    Constitutional:  Alert, development consistent with age. HEENT:  NC/NT. Airway patent. Eyes PERRL. External ears normal.  Pharynx without erythema or exudate. Neck:  Normal ROM. Supple. No TTP. Lungs:  Clear to auscultation and breath sounds equal.  Heart:  Regular rate and rhythm, normal heart sounds, without pathological murmurs, ectopy, gallops, or rubs. Abdomen:  Soft, nontender, good bowel sounds. No firm or pulsatile mass. Back: Tenderness: TTP over left paraspinal area with no appreciable midline tenderness. Swelling: No edema. Range of Motion: Decreased lateral and front to back ROM due to pain. CVA Tenderness: No CVA tenderness bilaterally. Skin:  No bruising, redness, abrasions, or rashes. Distal Function:              Motor deficit: Strength 5/5 in LE's bilaterally. Sensory deficit: Distal sensation intact. Pulse deficit: Distal pulses 2+ and bounding. Calf Tenderness:  No bilateral calf tenderness. Negative Homen's sign bilaterally               Reflexes: Intact at knee and achilles bilaterally. Gait:  No antalgia noted. Skin:  Normal turgor. Warm, dry, without visible rash. Neurological:  Alert and oriented. Motor functions intact. Lab / Imaging Results   (All laboratory and radiology results have been personally reviewed by myself)  Labs:  Results for orders placed or performed in visit on 01/17/20   POCT urine pregnancy   Result Value Ref Range    Preg Test, Ur neg     Control       Imaging: All Radiology results interpreted by Radiologist unless otherwise noted.   Xr Lumbar Spine (2-3 Views)    Result Date: 1/17/2020  LOCATION: [facility] EXAM: XR LUMBAR SPINE (2-3 VIEWS) COMPARISON: 2017. HISTORY: M54.5 Acute left-sided low back pain without sciatica. Lower back pain, hx of DDD. TECHNIQUE: 3 views of the lumbar spine were obtained. FINDINGS: Minimal retrolisthesis T12 over L1, L1 over L2, L2 over L3, and L3 over L4 similar in extent to the prior examination. Disc spaces are maintained. There is no paraspinal mass. No acute fracture. Chronic findings similar to prior examination 2017. Assessment / Plan     Impression(s):  Eve Chacko was seen today for back pain. Diagnoses and all orders for this visit:    Musculoskeletal back pain        - Tylenol/Ibuprofen as needed for pain        - Heating pad         - Follow up with PCP for further evaluation if symptoms persist    Acute left-sided low back pain without sciatica  -     XR LUMBAR SPINE (2-3 VIEWS); Future  -     POCT urine pregnancy    X-ray of lumbar spine was obtained in office showing chronic findings similar to prior exam from 2017, patient advised of results. Advise rest, ice, and/or moist heat for additional relief. PCP in 1-2 weeks if symptoms persist. ED sooner if symptoms worsen or change. ED immediately with any fever, severe or worsening back pain, paresthesias, weakness, or GI/ incontinence. Pt states understanding and is in agreement with this care plan. All questions answered. Return if symptoms worsen or fail to improve.     Román Barroso, APRN - CNP None

## 2022-01-05 VITALS
WEIGHT: 101.19 LBS | DIASTOLIC BLOOD PRESSURE: 72 MMHG | HEIGHT: 61 IN | HEART RATE: 73 BPM | RESPIRATION RATE: 17 BRPM | OXYGEN SATURATION: 99 % | SYSTOLIC BLOOD PRESSURE: 108 MMHG | TEMPERATURE: 98 F

## 2022-01-05 RX ORDER — CHLORHEXIDINE GLUCONATE 213 G/1000ML
1 SOLUTION TOPICAL ONCE
Refills: 0 | Status: DISCONTINUED | OUTPATIENT
Start: 2022-01-06 | End: 2022-01-09

## 2022-01-05 RX ORDER — POVIDONE-IODINE 5 %
1 AEROSOL (ML) TOPICAL ONCE
Refills: 0 | Status: DISCONTINUED | OUTPATIENT
Start: 2022-01-06 | End: 2022-01-09

## 2022-01-05 NOTE — PATIENT PROFILE ADULT - FALL HARM RISK - UNIVERSAL INTERVENTIONS
Bed in lowest position, wheels locked, appropriate side rails in place/Call bell, personal items and telephone in reach/Instruct patient to call for assistance before getting out of bed or chair/Non-slip footwear when patient is out of bed/[place] to call system/Physically safe environment - no spills, clutter or unnecessary equipment/Purposeful Proactive Rounding/Room/bathroom lighting operational, light cord in reach

## 2022-01-05 NOTE — PATIENT PROFILE ADULT - FUNCTIONAL ASSESSMENT - DAILY ACTIVITY 6.
Called to schedule surgery with Dr Wiggins on 8/21/18 @ 9:45 am @ [facility] OR.  Surgery packet mailed on 7/9/18.  
4 = No assist / stand by assistance

## 2022-01-05 NOTE — PRE-OP CHECKLIST - WAS PATIENT ON BETA BLOCKER?
Prescription approved per [facility] Refill Protocol.    Micaela URENA RN, BSN, PHN       Yes

## 2022-01-06 ENCOUNTER — INPATIENT (INPATIENT)
Facility: HOSPITAL | Age: 67
LOS: 2 days | Discharge: HOME | DRG: 473 | End: 2022-01-09
Attending: SPECIALIST | Admitting: SPECIALIST
Payer: COMMERCIAL

## 2022-01-06 DIAGNOSIS — M43.12 SPONDYLOLISTHESIS, CERVICAL REGION: ICD-10-CM

## 2022-01-06 DIAGNOSIS — Z86.39 PERSONAL HISTORY OF OTHER ENDOCRINE, NUTRITIONAL AND METABOLIC DISEASE: ICD-10-CM

## 2022-01-06 DIAGNOSIS — Z41.9 ENCOUNTER FOR PROCEDURE FOR PURPOSES OTHER THAN REMEDYING HEALTH STATE, UNSPECIFIED: Chronic | ICD-10-CM

## 2022-01-06 DIAGNOSIS — Z98.1 ARTHRODESIS STATUS: Chronic | ICD-10-CM

## 2022-01-06 DIAGNOSIS — Z98.890 OTHER SPECIFIED POSTPROCEDURAL STATES: Chronic | ICD-10-CM

## 2022-01-06 LAB
BLD GP AB SCN SERPL QL: NEGATIVE — SIGNIFICANT CHANGE UP
RH IG SCN BLD-IMP: POSITIVE — SIGNIFICANT CHANGE UP

## 2022-01-06 DEVICE — IMPLANTABLE DEVICE: Type: IMPLANTABLE DEVICE | Status: FUNCTIONAL

## 2022-01-06 DEVICE — SURGIFOAM PAD 8CM X 12.5CM X 10MM (100): Type: IMPLANTABLE DEVICE | Status: FUNCTIONAL

## 2022-01-06 DEVICE — ROD TI STR 3.5X240MM: Type: IMPLANTABLE DEVICE | Status: FUNCTIONAL

## 2022-01-06 DEVICE — GRAFT BONE INFUSE KIT SM: Type: IMPLANTABLE DEVICE | Status: FUNCTIONAL

## 2022-01-06 DEVICE — SCREW POLY 3.5X18MM: Type: IMPLANTABLE DEVICE | Status: FUNCTIONAL

## 2022-01-06 DEVICE — SET SCREW TI T15: Type: IMPLANTABLE DEVICE | Status: FUNCTIONAL

## 2022-01-06 DEVICE — SURGIFLO MATRIX WITH THROMBIN KIT: Type: IMPLANTABLE DEVICE | Status: FUNCTIONAL

## 2022-01-06 RX ORDER — ONDANSETRON 8 MG/1
4 TABLET, FILM COATED ORAL EVERY 6 HOURS
Refills: 0 | Status: DISCONTINUED | OUTPATIENT
Start: 2022-01-06 | End: 2022-01-07

## 2022-01-06 RX ORDER — ACETAMINOPHEN 500 MG
975 TABLET ORAL EVERY 8 HOURS
Refills: 0 | Status: DISCONTINUED | OUTPATIENT
Start: 2022-01-06 | End: 2022-01-09

## 2022-01-06 RX ORDER — SODIUM CHLORIDE 9 MG/ML
1000 INJECTION, SOLUTION INTRAVENOUS
Refills: 0 | Status: DISCONTINUED | OUTPATIENT
Start: 2022-01-06 | End: 2022-01-07

## 2022-01-06 RX ORDER — LEVOTHYROXINE SODIUM 125 MCG
1 TABLET ORAL
Qty: 0 | Refills: 0 | DISCHARGE

## 2022-01-06 RX ORDER — SENNA PLUS 8.6 MG/1
2 TABLET ORAL AT BEDTIME
Refills: 0 | Status: DISCONTINUED | OUTPATIENT
Start: 2022-01-06 | End: 2022-01-09

## 2022-01-06 RX ORDER — PANTOPRAZOLE SODIUM 20 MG/1
40 TABLET, DELAYED RELEASE ORAL
Refills: 0 | Status: DISCONTINUED | OUTPATIENT
Start: 2022-01-06 | End: 2022-01-09

## 2022-01-06 RX ORDER — ACETAMINOPHEN 500 MG
1000 TABLET ORAL ONCE
Refills: 0 | Status: COMPLETED | OUTPATIENT
Start: 2022-01-06 | End: 2022-01-06

## 2022-01-06 RX ORDER — CYCLOBENZAPRINE HYDROCHLORIDE 10 MG/1
5 TABLET, FILM COATED ORAL THREE TIMES A DAY
Refills: 0 | Status: DISCONTINUED | OUTPATIENT
Start: 2022-01-06 | End: 2022-01-09

## 2022-01-06 RX ORDER — METOPROLOL TARTRATE 50 MG
1 TABLET ORAL
Qty: 0 | Refills: 0 | DISCHARGE

## 2022-01-06 RX ORDER — HYDROMORPHONE HYDROCHLORIDE 2 MG/ML
0.5 INJECTION INTRAMUSCULAR; INTRAVENOUS; SUBCUTANEOUS
Refills: 0 | Status: DISCONTINUED | OUTPATIENT
Start: 2022-01-06 | End: 2022-01-07

## 2022-01-06 RX ORDER — APREPITANT 80 MG/1
40 CAPSULE ORAL ONCE
Refills: 0 | Status: COMPLETED | OUTPATIENT
Start: 2022-01-06 | End: 2022-01-06

## 2022-01-06 RX ORDER — CEFAZOLIN SODIUM 1 G
2000 VIAL (EA) INJECTION EVERY 8 HOURS
Refills: 0 | Status: COMPLETED | OUTPATIENT
Start: 2022-01-06 | End: 2022-01-07

## 2022-01-06 RX ORDER — ONDANSETRON 8 MG/1
4 TABLET, FILM COATED ORAL EVERY 6 HOURS
Refills: 0 | Status: DISCONTINUED | OUTPATIENT
Start: 2022-01-06 | End: 2022-01-09

## 2022-01-06 RX ORDER — CLONAZEPAM 1 MG
0.5 TABLET ORAL
Refills: 0 | Status: DISCONTINUED | OUTPATIENT
Start: 2022-01-06 | End: 2022-01-09

## 2022-01-06 RX ORDER — HYDROMORPHONE HYDROCHLORIDE 2 MG/ML
0.5 INJECTION INTRAMUSCULAR; INTRAVENOUS; SUBCUTANEOUS
Refills: 0 | Status: DISCONTINUED | OUTPATIENT
Start: 2022-01-06 | End: 2022-01-09

## 2022-01-06 RX ORDER — GABAPENTIN 400 MG/1
300 CAPSULE ORAL ONCE
Refills: 0 | Status: COMPLETED | OUTPATIENT
Start: 2022-01-06 | End: 2022-01-06

## 2022-01-06 RX ORDER — MAGNESIUM HYDROXIDE 400 MG/1
30 TABLET, CHEWABLE ORAL EVERY 12 HOURS
Refills: 0 | Status: DISCONTINUED | OUTPATIENT
Start: 2022-01-06 | End: 2022-01-09

## 2022-01-06 RX ORDER — LEVOTHYROXINE SODIUM 125 MCG
75 TABLET ORAL DAILY
Refills: 0 | Status: DISCONTINUED | OUTPATIENT
Start: 2022-01-06 | End: 2022-01-09

## 2022-01-06 RX ORDER — GABAPENTIN 400 MG/1
300 CAPSULE ORAL THREE TIMES A DAY
Refills: 0 | Status: DISCONTINUED | OUTPATIENT
Start: 2022-01-06 | End: 2022-01-09

## 2022-01-06 RX ORDER — HYDROMORPHONE HYDROCHLORIDE 2 MG/ML
30 INJECTION INTRAMUSCULAR; INTRAVENOUS; SUBCUTANEOUS
Refills: 0 | Status: DISCONTINUED | OUTPATIENT
Start: 2022-01-06 | End: 2022-01-07

## 2022-01-06 RX ORDER — METOPROLOL TARTRATE 50 MG
200 TABLET ORAL DAILY
Refills: 0 | Status: DISCONTINUED | OUTPATIENT
Start: 2022-01-06 | End: 2022-01-09

## 2022-01-06 RX ORDER — BUPIVACAINE 13.3 MG/ML
20 INJECTION, SUSPENSION, LIPOSOMAL INFILTRATION ONCE
Refills: 0 | Status: DISCONTINUED | OUTPATIENT
Start: 2022-01-06 | End: 2022-01-09

## 2022-01-06 RX ORDER — GABAPENTIN 400 MG/1
1 CAPSULE ORAL
Qty: 0 | Refills: 0 | DISCHARGE

## 2022-01-06 RX ORDER — NALOXONE HYDROCHLORIDE 4 MG/.1ML
0.1 SPRAY NASAL
Refills: 0 | Status: DISCONTINUED | OUTPATIENT
Start: 2022-01-06 | End: 2022-01-09

## 2022-01-06 RX ADMIN — SODIUM CHLORIDE 85 MILLILITER(S): 9 INJECTION, SOLUTION INTRAVENOUS at 19:24

## 2022-01-06 RX ADMIN — Medication 1000 MILLIGRAM(S): at 08:45

## 2022-01-06 RX ADMIN — Medication 975 MILLIGRAM(S): at 23:40

## 2022-01-06 RX ADMIN — Medication 975 MILLIGRAM(S): at 22:57

## 2022-01-06 RX ADMIN — GABAPENTIN 300 MILLIGRAM(S): 400 CAPSULE ORAL at 08:46

## 2022-01-06 RX ADMIN — APREPITANT 40 MILLIGRAM(S): 80 CAPSULE ORAL at 08:44

## 2022-01-06 RX ADMIN — HYDROMORPHONE HYDROCHLORIDE 30 MILLILITER(S): 2 INJECTION INTRAMUSCULAR; INTRAVENOUS; SUBCUTANEOUS at 19:19

## 2022-01-06 RX ADMIN — GABAPENTIN 300 MILLIGRAM(S): 400 CAPSULE ORAL at 22:58

## 2022-01-06 RX ADMIN — Medication 100 MILLIGRAM(S): at 22:59

## 2022-01-06 NOTE — H&P ADULT - PROBLEM SELECTOR PLAN 1
Admit to Orthopaedic Service.  Presents today for elective posterior cervical fusion C2-3, hardware removal C3-7 with re-instrumentation, bone graft.  Pt medically stable and cleared for procedure today by Dr. Lo

## 2022-01-06 NOTE — H&P ADULT - NSHPLABSRESULTS_GEN_ALL_CORE
preop cbc  h/h .9/31.3  preop cmp/ua/coags - within normal limits, reviewed by medical clearance   Cr 0.8 preop   Preop EKG: NSR, reviewed by medical clearance.   covid pcr negative

## 2022-01-06 NOTE — CONSULT NOTE ADULT - ATTENDING COMMENTS
ASSESSMENT: This is a 66y old Female with a history of hypothyroidism PSH of cervical fusion in 2019 and spinal surgery in June 2020 scheduled for posterior cervical fusion C2-3, hardware removal C3-7 with instrumentation/bone graft with Dr. Ricardo.    Recommended Treatment PLAN:  1. Dilaudid PCA 0.2 mg d8ymbadsk. 9 mg 4 hr max; Dilaudid 0.5 mg 2hr rescue clinician bolus  2. Tylenol 975 mg PO TID  3. Flexeril 5 mg PO q8h PRN muscle spasm  Pt seen and examined by me, NP acted as scribe

## 2022-01-06 NOTE — PROGRESS NOTE ADULT - SUBJECTIVE AND OBJECTIVE BOX
Ortho Post Op Check    Procedure: PCF C2-C3, NIELS C3-C7 with reinstrumentation, bone graft  Surgeon: Dr. Ricardo    Subjective:  Pt comfortable without complaints, pain controlled with medication.  Denies CP, SOB, N/V, numbness/tingling.    Objective:  Vital Signs Last 24 Hrs  T(C): 36.1 (01-06-22 @ 17:38), Max: 36.1 (01-06-22 @ 17:38)  T(F): 97 (01-06-22 @ 17:38), Max: 97 (01-06-22 @ 17:38)  HR: 78 (01-06-22 @ 20:08) (68 - 78)  BP: 104/58 (01-06-22 @ 20:08) (101/59 - 128/70)  BP(mean): 74 (01-06-22 @ 20:08) (74 - 91)  RR: 14 (01-06-22 @ 17:38) (14 - 14)  SpO2: 100% (01-06-22 @ 20:08) (100% - 100%)    General: Pt Alert and oriented, NAD  Dressing C/D/I  B/l UE:  Motor: 5/5 biceps/triceps, wrist flexion/ext, hand intrinsics  Sensation: SILT throughout limb in ax/m/u/r nerve distributions  Radial pulse palpable    A/P: 66yFemale s/p PCF C2-C3, NIELS C3-C7 with reinstrumentation, bone graft on 01-06.  - Stable  - Pain Control  - DVT ppx: SCDs  - Post op abx: Ancef 2g q8h x2 postoperative doses  - Resume home meds  - F/u AM labs    Ortho Pager [phone] Ortho Post Op Check    Procedure: PCF C2-C3, NIELS C3-C7 with reinstrumentation, bone graft  Surgeon: Dr. Ricardo    Subjective:  Pt drowsy but able to follow commands.  Denies CP, SOB, N/V, numbness/tingling.    Objective:  Vital Signs Last 24 Hrs  T(C): 36.1 (01-06-22 @ 17:38), Max: 36.1 (01-06-22 @ 17:38)  T(F): 97 (01-06-22 @ 17:38), Max: 97 (01-06-22 @ 17:38)  HR: 78 (01-06-22 @ 20:08) (68 - 78)  BP: 104/58 (01-06-22 @ 20:08) (101/59 - 128/70)  BP(mean): 74 (01-06-22 @ 20:08) (74 - 91)  RR: 14 (01-06-22 @ 17:38) (14 - 14)  SpO2: 100% (01-06-22 @ 20:08) (100% - 100%)    General: Pt Alert and oriented, NAD  Dressing C/D/I  B/l UE:  Motor: 5/5 biceps/triceps,  strength  Sensation: SILT throughout limb in ax/m/u/r nerve distributions  Radial pulse palpable    A/P: 66yFemale s/p PCF C2-C3, NIELS C3-C7 with reinstrumentation, bone graft on 01-06.  - Stable  - Pain Control  - DVT ppx: SCDs  - Post op abx: Ancef 2g q8h x2 postoperative doses  - Resume home meds  - F/u AM labs    Ortho Pager [phone]

## 2022-01-06 NOTE — PACU DISCHARGE NOTE - COMMENTS
Pt has met criteria for discharge to tele floor.  Report called to Rosa on 9 [place].  Neuro exam remains strong; equal.  VSS.  Iv Remains patent.  Baig and hemovac patent and draining well.  Posterior neck dsg clean and dry.  Pt transported via bed, on cardiac  monitor.  O2 2l nc in place.  Pt taken to floor by MIRANDA Hernandes RN.  PCA verified with receiving RN.

## 2022-01-06 NOTE — H&P ADULT - NSHPPHYSICALEXAM_GEN_ALL_CORE
MSK: Skin well perfused. Sensation intact to bilateral upper extremities.  strength intact bilateral upper extremities. Firing biceps/trices bilateral upper extremities. Decreased ROM neck 2/2 prior fusion.   Remainder of physical exam as per medical clearance note

## 2022-01-06 NOTE — H&P ADULT - NSICDXPASTSURGICALHX_GEN_ALL_CORE_FT
PAST SURGICAL HISTORY:  H/O left knee surgery     S/P cervical spinal fusion ACDF C3-7    Surgery, elective left foot x 6 surgeries    Surgery, elective shoulder -right x 3    Surgery, elective infertility procedures

## 2022-01-06 NOTE — H&P ADULT - HISTORY OF PRESENT ILLNESS
66F s/p cervical fusion in 2019 and extensive spinal surgery in June 2020 and subsequent fall in march 2021 presents tody for elective posterior cervical fusion C2-3, hardware removal C3-7 with re-instrumentation, bone graft. She denies use of assistive walking devices. Failed conservative therapies for her symptoms.  Denies history of DVT. She states that after  her surgery in 2020 she had an "occlusion" in her right arm.

## 2022-01-06 NOTE — CONSULT NOTE ADULT - SUBJECTIVE AND OBJECTIVE BOX
Pain Management Consult Note - [facility] [phone]    Chief Complaint: neck pain     HPI: Patient seen and examined today. This is a 67 y/o female PMH of hypothyroidism PSH of cervical fusion in 2019 and spinal surgery in June 2020 scheduled for posterior cervical fusion C2-3, hardware removal C3-7 with instrumentation/bone graft with Dr. Ricardo. Patient reports she had a fall in March 2021. Patient reports she has not had neck pain at home, and denies numbness or tingling to the bilateral upper extremities. At home, patient reports taking Flexeril 5 mg PO 2 tabs at bedtime. Patient reports Percocet "doesn't work" for her and reports Morphine PO makes her feel sick.Patient is istop positive for Clonazepam0.5 mg PO q12h PRN prescribed by Blu Lo.    Pain is __X_ sharp ____dull ___burning ___achy ___ Intensity: ___X_ mild ___mod ___severe     Location __X__surgical site __X__cervical _____lumbar ____abd ____upper ext____lower ext    Worse with ___X_activity __X__movement _____physical therapy___ Rest    Improved with _X___medication __X__rest ____physical therapy    ROS: Const:  N___febrile   Eyes:___ENT:___CV: __N_chest pain  Resp: __N__sob  GI:__N_nausea N___vomiting __N_abd pain __Y_npo ___clears __full diet __bm  :___ Musk: __Y_pain ___spasm  Skin:___ Neuro:  _N__sedation___confusion___N numbness __N_weakness __N_paresth  Psych:__anxiety  Endo:___ Heme:___Allergy:___NKDA______, __Y_all others reviewed and negative    PAST MEDICAL & SURGICAL HISTORY:  Cervical stenosis of spine  H/O: hypothyroidism  Panic attack  Surgery, elective  left foot x 6 surgeries  Surgery, elective  shoulder -right x 3  Surgery, elective  infertility procedures  S/P cervical spinal fusion  ACDF C3-7  H/O left knee surgery    SH: _N__Tobacco   Y___Alcohol                          FH:FAMILY HISTORY: no pertinent medical history noted in first degree relatives    chlorhexidine 2% Cloths 1 Application(s) Topical once  povidone iodine 5% Nasal Swab 1 Application(s) Both Nostrils once    T(C): --  HR: --  BP: --  RR: --  SpO2: --  Wt(kg): --    T(C): --  HR: --  BP: --  RR: --  SpO2: --  Wt(kg): --    T(C): --  HR: --  BP: --  RR: --  SpO2: --  Wt(kg): --    PHYSICAL EXAM:  Gen Appearance: __X_no acute distress __X_appropriate        Neuro: _X__SILT feet____ EOM Intact Psych: AAOX_3_, __X_mood/affect appropriate        Eyes: X___conjunctiva WNL  _X____ Pupils equal and round        ENT: _X__ears and nose atraumatic__X_ Hearing grossly intact        Neck: _X__trachea midline, no visible masses ___thyroid without palpable mass    Resp: _X__Nml WOB____No tactile fremitus ___clear to auscultation    Cardio: __X_extremities free from edema ____pedal pulses palpable    GI/Abdomen: ___soft _____ Nontender___X___Nondistended_____HSM    Lymphatic: ___no palpable nodes in neck  ___no palpable nodes calves and feet    Skin/Wound: ___Incision, ___Dressing c/d/i,   ____surrounding tissues soft,  ___drain/chest tube present____    Muscular: EHL __5_/5  Gastrocnemius_5__/5    _X__absent clubbing/cyanosis      ASSESSMENT: This is a 66y old Female with a history of hypothyroidism PSH of cervical fusion in 2019 and spinal surgery in June 2020 scheduled for posterior cervical fusion C2-3, hardware removal C3-7 with instrumentation/bone graft with Dr. Ricardo.    Recommended Treatment PLAN:  1. Dilaudid PCA 0.2 mg h7qadkuvj. 9 mg 4 hr max; Dilaudid 0.5 mg 2hr rescue clinician bolus  2. Tylenol 975 mg PO TID  3. Flexeril 5 mg PO q8h PRN muscle spasm  Pt seen and examined by Dr. Ruby at PM rounds               [facility] [phone]    Chief Complaint: neck pain     HPI: Patient seen and examined today. This is a 65 y/o female PMH of hypothyroidism PSH of cervical fusion in 2019 and spinal surgery in June 2020 scheduled for posterior cervical fusion C2-3, hardware removal C3-7 with instrumentation/bone graft with Dr. Ricardo. Patient reports she had a fall in March 2021. Patient reports she has not had neck pain at home, and denies numbness or tingling to the bilateral upper extremities. At home, patient reports taking Flexeril 5 mg PO 2 tabs at bedtime. Patient reports Percocet "doesn't work" for her and reports Morphine PO makes her feel sick.Patient is istop positive for Clonazepam0.5 mg PO q12h PRN prescribed by Blu Lo.    Pain is __X_ sharp ____dull ___burning ___achy ___ Intensity: ___X_ mild ___mod ___severe     Location __X__surgical site __X__cervical _____lumbar ____abd ____upper ext____lower ext    Worse with ___X_activity __X__movement _____physical therapy___ Rest    Improved with _X___medication __X__rest ____physical therapy    ROS: Const:  N___febrile   Eyes:___ENT:___CV: __N_chest pain  Resp: __N__sob  GI:__N_nausea N___vomiting __N_abd pain __Y_npo ___clears __full diet __bm  :___ Musk: __Y_pain ___spasm  Skin:___ Neuro:  _N__sedation___confusion___N numbness __N_weakness __N_paresth  Psych:__anxiety  Endo:___ Heme:___Allergy:___NKDA______, __Y_all others reviewed and negative    PAST MEDICAL & SURGICAL HISTORY:  Cervical stenosis of spine  H/O: hypothyroidism  Panic attack  Surgery, elective  left foot x 6 surgeries  Surgery, elective  shoulder -right x 3  Surgery, elective  infertility procedures  S/P cervical spinal fusion  ACDF C3-7  H/O left knee surgery    SH: _N__Tobacco   Y___Alcohol                          FH:FAMILY HISTORY: no pertinent medical history noted in first degree relatives    chlorhexidine 2% Cloths 1 Application(s) Topical once  povidone iodine 5% Nasal Swab 1 Application(s) Both Nostrils once    T(C): --  HR: --  BP: --  RR: --  SpO2: --  Wt(kg): --    T(C): --  HR: --  BP: --  RR: --  SpO2: --  Wt(kg): --    T(C): --  HR: --  BP: --  RR: --  SpO2: --  Wt(kg): --    PHYSICAL EXAM:  Gen Appearance: __X_no acute distress __X_appropriate        Neuro: _X__SILT feet____ EOM Intact Psych: AAOX_3_, __X_mood/affect appropriate        Eyes: X___conjunctiva WNL  _X____ Pupils equal and round        ENT: _X__ears and nose atraumatic__X_ Hearing grossly intact        Neck: _X__trachea midline, no visible masses ___thyroid without palpable mass    Resp: _X__Nml WOB____No tactile fremitus ___clear to auscultation    Cardio: __X_extremities free from edema ____pedal pulses palpable    GI/Abdomen: ___soft _____ Nontender___X___Nondistended_____HSM    Lymphatic: ___no palpable nodes in neck  ___no palpable nodes calves and feet    Skin/Wound: ___Incision, ___Dressing c/d/i,   ____surrounding tissues soft,  ___drain/chest tube present____    Muscular: EHL __5_/5  Gastrocnemius_5__/5    _X__absent clubbing/cyanosis

## 2022-01-07 LAB
ANION GAP SERPL CALC-SCNC: 12 MMOL/L — SIGNIFICANT CHANGE UP (ref 5–17)
BASOPHILS # BLD AUTO: 0.01 K/UL — SIGNIFICANT CHANGE UP (ref 0–0.2)
BASOPHILS NFR BLD AUTO: 0.1 % — SIGNIFICANT CHANGE UP (ref 0–2)
BUN SERPL-MCNC: 20 MG/DL — SIGNIFICANT CHANGE UP (ref 7–23)
CALCIUM SERPL-MCNC: 7.8 MG/DL — LOW (ref 8.4–10.5)
CHLORIDE SERPL-SCNC: 105 MMOL/L — SIGNIFICANT CHANGE UP (ref 96–108)
CO2 SERPL-SCNC: 21 MMOL/L — LOW (ref 22–31)
CREAT SERPL-MCNC: 0.72 MG/DL — SIGNIFICANT CHANGE UP (ref 0.5–1.3)
EOSINOPHIL # BLD AUTO: 0 K/UL — SIGNIFICANT CHANGE UP (ref 0–0.5)
EOSINOPHIL NFR BLD AUTO: 0 % — SIGNIFICANT CHANGE UP (ref 0–6)
GLUCOSE SERPL-MCNC: 85 MG/DL — SIGNIFICANT CHANGE UP (ref 70–99)
HCT VFR BLD CALC: 27.2 % — LOW (ref 34.5–45)
HGB BLD-MCNC: 8.1 G/DL — LOW (ref 11.5–15.5)
IMM GRANULOCYTES NFR BLD AUTO: 0.2 % — SIGNIFICANT CHANGE UP (ref 0–1.5)
LYMPHOCYTES # BLD AUTO: 0.5 K/UL — LOW (ref 1–3.3)
LYMPHOCYTES # BLD AUTO: 5.3 % — LOW (ref 13–44)
MCHC RBC-ENTMCNC: 24.5 PG — LOW (ref 27–34)
MCHC RBC-ENTMCNC: 29.8 GM/DL — LOW (ref 32–36)
MCV RBC AUTO: 82.2 FL — SIGNIFICANT CHANGE UP (ref 80–100)
MONOCYTES # BLD AUTO: 0.44 K/UL — SIGNIFICANT CHANGE UP (ref 0–0.9)
MONOCYTES NFR BLD AUTO: 4.6 % — SIGNIFICANT CHANGE UP (ref 2–14)
NEUTROPHILS # BLD AUTO: 8.51 K/UL — HIGH (ref 1.8–7.4)
NEUTROPHILS NFR BLD AUTO: 89.8 % — HIGH (ref 43–77)
NRBC # BLD: 0 /100 WBCS — SIGNIFICANT CHANGE UP (ref 0–0)
PLATELET # BLD AUTO: 270 K/UL — SIGNIFICANT CHANGE UP (ref 150–400)
POTASSIUM SERPL-MCNC: 4.5 MMOL/L — SIGNIFICANT CHANGE UP (ref 3.5–5.3)
POTASSIUM SERPL-SCNC: 4.5 MMOL/L — SIGNIFICANT CHANGE UP (ref 3.5–5.3)
RBC # BLD: 3.31 M/UL — LOW (ref 3.8–5.2)
RBC # FLD: 18.3 % — HIGH (ref 10.3–14.5)
SODIUM SERPL-SCNC: 138 MMOL/L — SIGNIFICANT CHANGE UP (ref 135–145)
WBC # BLD: 9.48 K/UL — SIGNIFICANT CHANGE UP (ref 3.8–10.5)
WBC # FLD AUTO: 9.48 K/UL — SIGNIFICANT CHANGE UP (ref 3.8–10.5)

## 2022-01-07 PROCEDURE — 72040 X-RAY EXAM NECK SPINE 2-3 VW: CPT | Mod: 26

## 2022-01-07 RX ORDER — HYDROMORPHONE HYDROCHLORIDE 2 MG/ML
4 INJECTION INTRAMUSCULAR; INTRAVENOUS; SUBCUTANEOUS EVERY 4 HOURS
Refills: 0 | Status: DISCONTINUED | OUTPATIENT
Start: 2022-01-07 | End: 2022-01-09

## 2022-01-07 RX ORDER — HYDROMORPHONE HYDROCHLORIDE 2 MG/ML
2 INJECTION INTRAMUSCULAR; INTRAVENOUS; SUBCUTANEOUS EVERY 4 HOURS
Refills: 0 | Status: DISCONTINUED | OUTPATIENT
Start: 2022-01-07 | End: 2022-01-09

## 2022-01-07 RX ORDER — HYDROMORPHONE HYDROCHLORIDE 2 MG/ML
0.5 INJECTION INTRAMUSCULAR; INTRAVENOUS; SUBCUTANEOUS EVERY 4 HOURS
Refills: 0 | Status: DISCONTINUED | OUTPATIENT
Start: 2022-01-07 | End: 2022-01-09

## 2022-01-07 RX ADMIN — GABAPENTIN 300 MILLIGRAM(S): 400 CAPSULE ORAL at 06:30

## 2022-01-07 RX ADMIN — Medication 1 TABLET(S): at 11:11

## 2022-01-07 RX ADMIN — SENNA PLUS 2 TABLET(S): 8.6 TABLET ORAL at 21:30

## 2022-01-07 RX ADMIN — HYDROMORPHONE HYDROCHLORIDE 4 MILLIGRAM(S): 2 INJECTION INTRAMUSCULAR; INTRAVENOUS; SUBCUTANEOUS at 17:17

## 2022-01-07 RX ADMIN — HYDROMORPHONE HYDROCHLORIDE 4 MILLIGRAM(S): 2 INJECTION INTRAMUSCULAR; INTRAVENOUS; SUBCUTANEOUS at 18:00

## 2022-01-07 RX ADMIN — GABAPENTIN 300 MILLIGRAM(S): 400 CAPSULE ORAL at 13:45

## 2022-01-07 RX ADMIN — GABAPENTIN 300 MILLIGRAM(S): 400 CAPSULE ORAL at 21:30

## 2022-01-07 RX ADMIN — HYDROMORPHONE HYDROCHLORIDE 2 MILLIGRAM(S): 2 INJECTION INTRAMUSCULAR; INTRAVENOUS; SUBCUTANEOUS at 21:03

## 2022-01-07 RX ADMIN — Medication 975 MILLIGRAM(S): at 06:30

## 2022-01-07 RX ADMIN — HYDROMORPHONE HYDROCHLORIDE 2 MILLIGRAM(S): 2 INJECTION INTRAMUSCULAR; INTRAVENOUS; SUBCUTANEOUS at 11:12

## 2022-01-07 RX ADMIN — Medication 975 MILLIGRAM(S): at 21:30

## 2022-01-07 RX ADMIN — HYDROMORPHONE HYDROCHLORIDE 2 MILLIGRAM(S): 2 INJECTION INTRAMUSCULAR; INTRAVENOUS; SUBCUTANEOUS at 12:09

## 2022-01-07 RX ADMIN — Medication 975 MILLIGRAM(S): at 07:15

## 2022-01-07 RX ADMIN — Medication 75 MICROGRAM(S): at 06:31

## 2022-01-07 RX ADMIN — Medication 100 MILLIGRAM(S): at 06:14

## 2022-01-07 RX ADMIN — Medication 975 MILLIGRAM(S): at 13:45

## 2022-01-07 RX ADMIN — Medication 975 MILLIGRAM(S): at 14:37

## 2022-01-07 RX ADMIN — HYDROMORPHONE HYDROCHLORIDE 2 MILLIGRAM(S): 2 INJECTION INTRAMUSCULAR; INTRAVENOUS; SUBCUTANEOUS at 21:30

## 2022-01-07 RX ADMIN — PANTOPRAZOLE SODIUM 40 MILLIGRAM(S): 20 TABLET, DELAYED RELEASE ORAL at 06:32

## 2022-01-07 RX ADMIN — SODIUM CHLORIDE 85 MILLILITER(S): 9 INJECTION, SOLUTION INTRAVENOUS at 06:40

## 2022-01-07 NOTE — PROGRESS NOTE ADULT - SUBJECTIVE AND OBJECTIVE BOX
Ortho Note    Pt seen and examined. Comfortable without complaints, pain controlled  Denies CP, SOB, N/V, numbness/tingling     Vital Signs Last 24 Hrs  T(C): 37.2 (01-07-22 @ 08:45), Max: 37.2 (01-07-22 @ 08:45)  T(F): 98.9 (01-07-22 @ 08:45), Max: 98.9 (01-07-22 @ 08:45)  HR: 75 (01-07-22 @ 08:45) (75 - 81)  BP: 98/56 (01-07-22 @ 08:45) (98/56 - 107/58)  BP(mean): --  RR: 16 (01-07-22 @ 08:45) (16 - 16)  SpO2: 97% (01-07-22 @ 08:45) (97% - 97%)  AVSS    General: Pt Alert and oriented, NAD  DSG- prineo C/D/I, cervical collar in place; HV x 1  Pulses: +2RP, WWP fingers  Sensation: mildly diminished but intact BUE  Motor: 5/5 /bicep/tricep/deltoiod                        8.1    9.48  )-----------( 270      ( 07 Jan 2022 06:54 )             27.2     01-07    138  |  105  |  20  ----------------------------<  85  4.5   |  21<L>  |  0.72    Ca    7.8<L>      07 Jan 2022 06:54        A/P: 66y Female s/p C2-3 PCF w reinstrumentation C3-7  - VSS, Labs WNL  - Pain Control- appreciate pain management recs  - DVT ppx: SCDs  - PT, WBS: WBAT with cervical collar  - OOB for meals, I/S  - continue bowel regimen  - standing xrays  - monitor HV  - remove modi for TOV  - dispo: home pending PT clearance and HV x 1 removal    Ortho Pager [phone]

## 2022-01-07 NOTE — OCCUPATIONAL THERAPY INITIAL EVALUATION ADULT - GENERAL OBSERVATIONS, REHAB EVAL
Pt received sitting EOB with SATINDER Juarez (removing modi), A&Ox4, +heplock, +ZAYDA drain x1, +tele (SATINDER Juarez cleared pt ot be off tele), in NAD and agreeable to work with OT.

## 2022-01-07 NOTE — DISCHARGE NOTE PROVIDER - HOSPITAL COURSE
Admitted  Surgery - PCF C2-C3, NIELS C3-C7 with reinstrumentation, bone graft  Radha-op Antibiotics  Pain control  DVT prophylaxis  OOB/Occupational Therapy

## 2022-01-07 NOTE — OCCUPATIONAL THERAPY INITIAL EVALUATION ADULT - PERTINENT HX OF CURRENT PROBLEM, REHAB EVAL
Patient POD 1 s/p posterior cervical fusion C2-3, hardware removal C3-7 with instrumentation/bone graft with Dr. Ricardo.

## 2022-01-07 NOTE — PROGRESS NOTE ADULT - ASSESSMENT
A/P: 66y Female s/p C2-3 PCF w reinstrumentation C3-7    - Stable  - Pain control as per Pain mgmt recs  - Nausea control/Bowel regiment  - Home meds  - PT: pending eval  - WBAT  - Monitor drain output -- keep drain today  - Plan to remove Baig when ambulatory  - DVT ppx: SCDs  - [facility] -- Bonnie MALLOY vs Charlene -- May need pediatric sizes due to patient neck size    Ortho Pager [phone]

## 2022-01-07 NOTE — DISCHARGE NOTE PROVIDER - CARE PROVIDERS DIRECT ADDRESSES
[email or web address]

## 2022-01-07 NOTE — DISCHARGE NOTE PROVIDER - NSDCMRMEDTOKEN_GEN_ALL_CORE_FT
clonazePAM 0.5 mg oral tablet: 1 tab(s) orally 2 times a day  cyclobenzaprine 5 mg oral tablet: 1 tab(s) orally 3 times a day, As Needed - for muscle spasm MDD:3  gabapentin 300 mg oral capsule: 1 cap(s) orally 3 times a day  metoprolol succinate 200 mg oral tablet, extended release: 1 tab(s) orally once a day  Multiple Vitamins oral tablet: 1 tab(s) orally once a day  Protonix 40 mg oral delayed release tablet: 1 tab(s) orally 2 times a day MDD:2  Synthroid 75 mcg (0.075 mg) oral tablet: 1 tab(s) orally once a day   acetaminophen 325 mg oral tablet: 3 tab(s) orally every 8 hours  clonazePAM 0.5 mg oral tablet: 1 tab(s) orally 2 times a day  cyclobenzaprine 5 mg oral tablet: 1 tab(s) orally 3 times a day, As needed, Muscle Spasm MDD:3  Dilaudid 2 mg oral tablet: 1 tab(s) orally every 6 hours, As Needed -Moderate Pain (4 - 6) MDD:3   gabapentin 300 mg oral capsule: 1 cap(s) orally 3 times a day  metoprolol succinate 200 mg oral tablet, extended release: 1 tab(s) orally once a day  Multiple Vitamins oral tablet: 1 tab(s) orally once a day  Protonix 40 mg oral delayed release tablet: 1 tab(s) orally 2 times a day MDD:2  Synthroid 75 mcg (0.075 mg) oral tablet: 1 tab(s) orally once a day

## 2022-01-07 NOTE — DISCHARGE NOTE PROVIDER - NSDCCPCAREPLAN_GEN_ALL_CORE_FT
PRINCIPAL DISCHARGE DIAGNOSIS  Diagnosis: Spondylolisthesis, cervical region  Assessment and Plan of Treatment:

## 2022-01-07 NOTE — DISCHARGE NOTE PROVIDER - NSDCFUADDINST_GEN_ALL_CORE_FT
No strenuous activity (bending/twisting), heavy lifting, driving, exercising/gym activities or returning to work until cleared by MD.   Change dressing daily with gauze/tape till post-op day #5, then leave incision open to air.  You may shower post-op day#5, keep incision clean and dry. Do not apply any creams or ointments on the incision or around the incision/dressing.  Try to have regular bowel movements, take stool softener or laxative if necessary.  May take pepcid for upset stomach.  May apply ice to affected areas to decrease swelling.  Call to schedule an appt with Dr. Ricardo for follow up, if you have staples or sutures they will be removed in office.  Contact your doctor if you experience: fever greater than 101.5, chills, chest pain, difficulty breathing, redness or excessive drainage around the incision, other concerns.  Follow up with your primary care provider.

## 2022-01-07 NOTE — OCCUPATIONAL THERAPY INITIAL EVALUATION ADULT - FINE MOTOR COORDINATION, RIGHT HAND THUMB/FINGER OPPOSITION SKILLS, OT EVAL
LM for pt to call and schedule an appt   
LOV  08.05.20  NOV  05.03.21  
Patient needs labs and an appointment  
normal performance

## 2022-01-07 NOTE — PROGRESS NOTE ADULT - SUBJECTIVE AND OBJECTIVE BOX
Pain Management Progress Note - [facility] [phone]    HPI: Patient seen and examined today. Patient POD 1 s/p posterior cervical fusion C2-3, hardware removal C3-7 with instrumentation/bone graft with Dr. Ricardo. Patient reports doing well this AM, rating pain to be a 3 out of 10. Patient reports increased with pain with movement. Patient reports adequate pain control with Dilaudid PCA. Patient reports she has taken Percocet/Oxycodone in the past and has not helped her with pain. Patient denies side effects from current pain regimen.    Pertinent PMH: Pain at: ___Back __X_Neck___Knee ___Hip ___Shoulder ___ Opioid tolerance    Pain is _X__ sharp ____dull ___burning ___achy ___ Intensity: ____ mild __X__mod _X___severe     Location _X____surgical site ___X__cervical _____lumbar ____abd _____upper ext____lower ext    Worse with _X___activity __X__movement _____physical therapy___ Rest    Improved with ___X_medication _X___rest ____physical therapy    PMH:  Cervical stenosis of spine  H/O: hypothyroidism  Panic attack  Surgery, elective  left foot x 6 surgeries  Surgery, elective  shoulder -right x 3  Surgery, elective  infertility procedures  S/P cervical spinal fusion  ACDF C3-7  H/O left knee surgery    Medications:  HYDROmorphone  Injectable  HYDROmorphone   Tablet  HYDROmorphone   Tablet  BUpivacaine liposome 1.3% Injectable (no eMAR)  multivitamin  senna  magnesium hydroxide Suspension  ondansetron Injectable  ceFAZolin   IVPB  HYDROmorphone  Injectable  lactated ringers.  levothyroxine  pantoprazole    Tablet  metoprolol succinate ER  gabapentin  clonazePAM  Tablet  cyclobenzaprine  acetaminophen     Tablet ..  ondansetron Injectable  naloxone Injectable  HYDROmorphone PCA (1 mG/mL) Rescue Clinician Bolus  HYDROmorphone PCA (1 mG/mL)  HYDROmorphone  Injectable  gabapentin  aprepitant  acetaminophen     Tablet ..  chlorhexidine 2% Cloths  povidone iodine 5% Nasal Swab    ROS: Const:  __N_febrile   Eyes:___ENT:___CV: __N_chest pain  Resp: __N__sob  GI:__N_nausea _N__vomiting __N__abd pain ___npo ___clears _Y__full diet __bm  :___ Musk: _Y__pain ___spasm  Skin:___ Neuro:  _N__sedation__N_confusion___N_ numbness _N__weakness N___paresthesia  Psych:___anxiety  Endo:___ Heme:___Allergy:_NKDA__      01-07 @ 06:5487 mL/min/1.73M2  Hemoglobin: 8.1 g/dL (01-07 @ 06:54)  T(C): 37.2 (01-07-22 @ 08:45), Max: 37.2 (01-07-22 @ 08:45)  HR: 75 (01-07-22 @ 08:45) (68 - 83)  BP: 98/56 (01-07-22 @ 08:45) (98/56 - 128/70)  RR: 16 (01-07-22 @ 08:45) (14 - 16)  SpO2: 97% (01-07-22 @ 08:45) (97% - 100%)  Wt(kg): --     PHYSICAL EXAM:  Gen Appearance: _X__no acute distress _X__appropriate       Neuro: ___SILT feet____ EOM Intact Psych: AAOX__3, _X__mood/affect appropriate        Eyes: __X_conjunctiva WNL  __X___ Pupils equal and round        ENT: __X_ears and nose atraumatic__X_ Hearing grossly intact        Neck: _X__trachea midline, no visible masses ___thyroid without palpable mass    Resp: _X__Nml WOB____No tactile fremitus ___clear to auscultation    Cardio: _X__extremities free from edema ____pedal pulses palpable    GI/Abdomen: ___soft _____ Nontender____X__Nondistended_____HSM    Lymphatic: ___no palpable nodes in neck  ___no palpable nodes calves and feet    Skin/Wound: ___Incision, __X_Dressing c/d/i,   X____surrounding tissues soft,  _X__drain/chest tube present____    Muscular: EHL ___/5  Gastrocnemius___/5    X___absent clubbing/cyanosis         ASSESSMENT:  This is a 66y old Female with a history of hypothyroidism PSH of cervical fusion in 2019 and spinal surgery in June 2020 scheduled for posterior cervical fusion C2-3, hardware removal C3-7 with instrumentation/bone graft with Dr. Ricardo, doing well.    Recommended Treatment PLAN:  1. D/C Dilaudid PCA  2. Start Dilaudid 2-4 mg PO q4h PRN moderate-severe pain  3. Start Dilaudid 0.5 mg IVP q4h PRN breakthrough pain  4. Tylenol 975 mg PO TID  5. Flexeril 5 mg PO q8h PRN muscle spasm  Plan discussed with Dr. Ruby

## 2022-01-07 NOTE — DISCHARGE NOTE PROVIDER - CARE PROVIDER_API CALL
Parish Ricardo)  Orthopaedic Surgery  [facility] [facility], [place] [address]  Phone: [phone]  Fax: [phone]  Follow Up Time:

## 2022-01-07 NOTE — DISCHARGE NOTE PROVIDER - NSDCCPTREATMENT_GEN_ALL_CORE_FT
PRINCIPAL PROCEDURE  Procedure: Laminectomy, spine, cervical, with posterior fusion  Findings and Treatment:

## 2022-01-07 NOTE — OCCUPATIONAL THERAPY INITIAL EVALUATION ADULT - ADDITIONAL COMMENTS
Pt reports living alone in an elevator apt building. Reports being independent with ADL and IADL prior to this admission. Pt has had multiple spinal surgeries since 2019.

## 2022-01-07 NOTE — PROGRESS NOTE ADULT - SUBJECTIVE AND OBJECTIVE BOX
Orthopaedic Spine Resident Note    S:  No acute overnight events.  Pain well controlled.    No fevers/chills/shortness of breath/chest pain/new neurologic complaints.  Patient w mild discomfort in soft collar    Vital Signs Last 24 Hrs  T(C): 36.6 (07 Jan 2022 06:27), Max: 36.6 (06 Jan 2022 21:18)  T(F): 97.8 (07 Jan 2022 06:27), Max: 97.8 (06 Jan 2022 21:18)  HR: 81 (07 Jan 2022 06:27) (68 - 83)  BP: 107/58 (07 Jan 2022 06:27) (101/59 - 128/70)  BP(mean): 74 (06 Jan 2022 20:08) (74 - 91)  RR: 16 (07 Jan 2022 06:27) (14 - 16)  SpO2: 97% (07 Jan 2022 06:27) (97% - 100%)    VSS  General: Well-appearing adult Female lying supine; NAD; A&Ox3  Neck: Soft collar in place, Dressing CDI  Hemovac drains x 1 w 125cc output  Motor:  LUE- Delt/Bicep/Tricep/Wrist Ext/ 5/5 strength   RUE- Delt/Bicep/Tricep/Wrist Ext/ 5/5 strength  Sensory:  LUE- SILT C5-C7 dermatomes; Reduced C8-T1 dermatomes which is symmetric and likely secondary to positioning  RUE- SILT C5-C7 dermatomes; Reduced C8-T1 dermatomes which is symmetric and likely secondary to positioning  Vascular:  Hands WWP; Radial 2+ bilaterally; Cap refill < 2 sec    I&O's Detail    06 Jan 2022 07:01  -  07 Jan 2022 07:00  --------------------------------------------------------  IN:    IV PiggyBack: 100 mL    Lactated Ringers: 500 mL  Total IN: 600 mL    OUT:    Accordian (mL): 125 mL    Indwelling Catheter - Urethral (mL): 750 mL  Total OUT: 875 mL    Total NET: -275 mL          Labs:                        8.1    9.48  )-----------( 270      ( 07 Jan 2022 06:54 )             27.2     07 Jan 2022 06:54    138    |  105    |  20     ----------------------------<  85     4.5     |  21     |  0.72     Ca    7.8        07 Jan 2022 06:54

## 2022-01-07 NOTE — OCCUPATIONAL THERAPY INITIAL EVALUATION ADULT - REHAB POTENTIAL, OT EVAL
Pt demonstrated independence with ADL, functional mobility and functional transfers. Pt with no acute skilled OT needs at this time and to be discharged from OT program.

## 2022-01-08 LAB
ANION GAP SERPL CALC-SCNC: 7 MMOL/L — SIGNIFICANT CHANGE UP (ref 5–17)
BASOPHILS # BLD AUTO: 0.02 K/UL — SIGNIFICANT CHANGE UP (ref 0–0.2)
BASOPHILS NFR BLD AUTO: 0.3 % — SIGNIFICANT CHANGE UP (ref 0–2)
BUN SERPL-MCNC: 18 MG/DL — SIGNIFICANT CHANGE UP (ref 7–23)
CALCIUM SERPL-MCNC: 8.6 MG/DL — SIGNIFICANT CHANGE UP (ref 8.4–10.5)
CHLORIDE SERPL-SCNC: 105 MMOL/L — SIGNIFICANT CHANGE UP (ref 96–108)
CO2 SERPL-SCNC: 29 MMOL/L — SIGNIFICANT CHANGE UP (ref 22–31)
CREAT SERPL-MCNC: 0.83 MG/DL — SIGNIFICANT CHANGE UP (ref 0.5–1.3)
EOSINOPHIL # BLD AUTO: 0.13 K/UL — SIGNIFICANT CHANGE UP (ref 0–0.5)
EOSINOPHIL NFR BLD AUTO: 2.1 % — SIGNIFICANT CHANGE UP (ref 0–6)
GLUCOSE SERPL-MCNC: 96 MG/DL — SIGNIFICANT CHANGE UP (ref 70–99)
HCT VFR BLD CALC: 25.5 % — LOW (ref 34.5–45)
HGB BLD-MCNC: 7.7 G/DL — LOW (ref 11.5–15.5)
IMM GRANULOCYTES NFR BLD AUTO: 0.2 % — SIGNIFICANT CHANGE UP (ref 0–1.5)
LYMPHOCYTES # BLD AUTO: 1.4 K/UL — SIGNIFICANT CHANGE UP (ref 1–3.3)
LYMPHOCYTES # BLD AUTO: 22.1 % — SIGNIFICANT CHANGE UP (ref 13–44)
MCHC RBC-ENTMCNC: 24.8 PG — LOW (ref 27–34)
MCHC RBC-ENTMCNC: 30.2 GM/DL — LOW (ref 32–36)
MCV RBC AUTO: 82.3 FL — SIGNIFICANT CHANGE UP (ref 80–100)
MONOCYTES # BLD AUTO: 0.62 K/UL — SIGNIFICANT CHANGE UP (ref 0–0.9)
MONOCYTES NFR BLD AUTO: 9.8 % — SIGNIFICANT CHANGE UP (ref 2–14)
NEUTROPHILS # BLD AUTO: 4.16 K/UL — SIGNIFICANT CHANGE UP (ref 1.8–7.4)
NEUTROPHILS NFR BLD AUTO: 65.5 % — SIGNIFICANT CHANGE UP (ref 43–77)
NRBC # BLD: 0 /100 WBCS — SIGNIFICANT CHANGE UP (ref 0–0)
PLATELET # BLD AUTO: 273 K/UL — SIGNIFICANT CHANGE UP (ref 150–400)
POTASSIUM SERPL-MCNC: 4.3 MMOL/L — SIGNIFICANT CHANGE UP (ref 3.5–5.3)
POTASSIUM SERPL-SCNC: 4.3 MMOL/L — SIGNIFICANT CHANGE UP (ref 3.5–5.3)
RBC # BLD: 3.1 M/UL — LOW (ref 3.8–5.2)
RBC # FLD: 18.7 % — HIGH (ref 10.3–14.5)
SODIUM SERPL-SCNC: 141 MMOL/L — SIGNIFICANT CHANGE UP (ref 135–145)
WBC # BLD: 6.34 K/UL — SIGNIFICANT CHANGE UP (ref 3.8–10.5)
WBC # FLD AUTO: 6.34 K/UL — SIGNIFICANT CHANGE UP (ref 3.8–10.5)

## 2022-01-08 RX ADMIN — ONDANSETRON 4 MILLIGRAM(S): 8 TABLET, FILM COATED ORAL at 20:54

## 2022-01-08 RX ADMIN — HYDROMORPHONE HYDROCHLORIDE 4 MILLIGRAM(S): 2 INJECTION INTRAMUSCULAR; INTRAVENOUS; SUBCUTANEOUS at 23:02

## 2022-01-08 RX ADMIN — HYDROMORPHONE HYDROCHLORIDE 4 MILLIGRAM(S): 2 INJECTION INTRAMUSCULAR; INTRAVENOUS; SUBCUTANEOUS at 11:19

## 2022-01-08 RX ADMIN — HYDROMORPHONE HYDROCHLORIDE 4 MILLIGRAM(S): 2 INJECTION INTRAMUSCULAR; INTRAVENOUS; SUBCUTANEOUS at 15:50

## 2022-01-08 RX ADMIN — Medication 975 MILLIGRAM(S): at 22:44

## 2022-01-08 RX ADMIN — SENNA PLUS 2 TABLET(S): 8.6 TABLET ORAL at 21:44

## 2022-01-08 RX ADMIN — Medication 975 MILLIGRAM(S): at 14:51

## 2022-01-08 RX ADMIN — CYCLOBENZAPRINE HYDROCHLORIDE 5 MILLIGRAM(S): 10 TABLET, FILM COATED ORAL at 19:46

## 2022-01-08 RX ADMIN — Medication 1 TABLET(S): at 10:19

## 2022-01-08 RX ADMIN — HYDROMORPHONE HYDROCHLORIDE 4 MILLIGRAM(S): 2 INJECTION INTRAMUSCULAR; INTRAVENOUS; SUBCUTANEOUS at 06:16

## 2022-01-08 RX ADMIN — Medication 975 MILLIGRAM(S): at 06:16

## 2022-01-08 RX ADMIN — HYDROMORPHONE HYDROCHLORIDE 4 MILLIGRAM(S): 2 INJECTION INTRAMUSCULAR; INTRAVENOUS; SUBCUTANEOUS at 19:51

## 2022-01-08 RX ADMIN — HYDROMORPHONE HYDROCHLORIDE 4 MILLIGRAM(S): 2 INJECTION INTRAMUSCULAR; INTRAVENOUS; SUBCUTANEOUS at 18:51

## 2022-01-08 RX ADMIN — GABAPENTIN 300 MILLIGRAM(S): 400 CAPSULE ORAL at 21:44

## 2022-01-08 RX ADMIN — HYDROMORPHONE HYDROCHLORIDE 4 MILLIGRAM(S): 2 INJECTION INTRAMUSCULAR; INTRAVENOUS; SUBCUTANEOUS at 14:50

## 2022-01-08 RX ADMIN — GABAPENTIN 300 MILLIGRAM(S): 400 CAPSULE ORAL at 14:50

## 2022-01-08 RX ADMIN — HYDROMORPHONE HYDROCHLORIDE 4 MILLIGRAM(S): 2 INJECTION INTRAMUSCULAR; INTRAVENOUS; SUBCUTANEOUS at 06:45

## 2022-01-08 RX ADMIN — Medication 975 MILLIGRAM(S): at 15:51

## 2022-01-08 RX ADMIN — PANTOPRAZOLE SODIUM 40 MILLIGRAM(S): 20 TABLET, DELAYED RELEASE ORAL at 06:16

## 2022-01-08 RX ADMIN — HYDROMORPHONE HYDROCHLORIDE 4 MILLIGRAM(S): 2 INJECTION INTRAMUSCULAR; INTRAVENOUS; SUBCUTANEOUS at 01:40

## 2022-01-08 RX ADMIN — Medication 975 MILLIGRAM(S): at 21:44

## 2022-01-08 RX ADMIN — GABAPENTIN 300 MILLIGRAM(S): 400 CAPSULE ORAL at 06:15

## 2022-01-08 RX ADMIN — HYDROMORPHONE HYDROCHLORIDE 4 MILLIGRAM(S): 2 INJECTION INTRAMUSCULAR; INTRAVENOUS; SUBCUTANEOUS at 10:19

## 2022-01-08 RX ADMIN — Medication 75 MICROGRAM(S): at 06:16

## 2022-01-08 RX ADMIN — HYDROMORPHONE HYDROCHLORIDE 4 MILLIGRAM(S): 2 INJECTION INTRAMUSCULAR; INTRAVENOUS; SUBCUTANEOUS at 01:10

## 2022-01-08 NOTE — PROGRESS NOTE ADULT - SUBJECTIVE AND OBJECTIVE BOX
Ortho Note    Pt seen and examined. Comfortable without complaints, pain controlled  Denies CP, SOB, N/V, numbness/tingling     Vital Signs Last 24 Hrs  T(C): 36.4 (08 Jan 2022 08:28), Max: 37.4 (07 Jan 2022 18:11)  T(F): 97.6 (08 Jan 2022 08:28), Max: 99.3 (07 Jan 2022 18:11)  HR: 88 (08 Jan 2022 08:28) (88 - 102)  BP: 111/62 (08 Jan 2022 08:28) (94/48 - 119/58)  BP(mean): --  RR: 17 (08 Jan 2022 08:28) (16 - 18)  SpO2: 96% (08 Jan 2022 08:28) (92% - 96%)    General: Pt Alert and oriented, NAD  DSG- prineo C/D/I, cervical collar in place; HV x 1  Pulses: +2RP, WWP fingers  Sensation: mildly diminished but intact BUE  Motor: 5/5 /bicep/tricep/deltoiod             A/P: 66y Female s/p C2-3 PCF w reinstrumentation C3-7  - VSS, Labs WNL  - Pain Control- appreciate pain management recs  - DVT ppx: SCDs  - PT, WBS: WBAT with cervical collar  - OOB for meals, I/S  - continue bowel regimen  - Post op xrays done  - monitor HV - 30/85  - dispo: Cleared OT, would like to go home Monday    Ortho Pager [phone]

## 2022-01-09 VITALS
HEART RATE: 80 BPM | DIASTOLIC BLOOD PRESSURE: 65 MMHG | OXYGEN SATURATION: 95 % | TEMPERATURE: 98 F | SYSTOLIC BLOOD PRESSURE: 118 MMHG | RESPIRATION RATE: 16 BRPM

## 2022-01-09 RX ORDER — HYDROMORPHONE HYDROCHLORIDE 2 MG/ML
1 INJECTION INTRAMUSCULAR; INTRAVENOUS; SUBCUTANEOUS
Qty: 30 | Refills: 0
Start: 2022-01-09

## 2022-01-09 RX ORDER — CYCLOBENZAPRINE HYDROCHLORIDE 10 MG/1
1 TABLET, FILM COATED ORAL
Qty: 60 | Refills: 0
Start: 2022-01-09 | End: 2022-01-28

## 2022-01-09 RX ORDER — ACETAMINOPHEN 500 MG
3 TABLET ORAL
Qty: 0 | Refills: 0 | DISCHARGE
Start: 2022-01-09

## 2022-01-09 RX ADMIN — HYDROMORPHONE HYDROCHLORIDE 4 MILLIGRAM(S): 2 INJECTION INTRAMUSCULAR; INTRAVENOUS; SUBCUTANEOUS at 03:09

## 2022-01-09 RX ADMIN — PANTOPRAZOLE SODIUM 40 MILLIGRAM(S): 20 TABLET, DELAYED RELEASE ORAL at 06:01

## 2022-01-09 RX ADMIN — Medication 200 MILLIGRAM(S): at 06:35

## 2022-01-09 RX ADMIN — Medication 975 MILLIGRAM(S): at 07:01

## 2022-01-09 RX ADMIN — HYDROMORPHONE HYDROCHLORIDE 4 MILLIGRAM(S): 2 INJECTION INTRAMUSCULAR; INTRAVENOUS; SUBCUTANEOUS at 04:09

## 2022-01-09 RX ADMIN — GABAPENTIN 300 MILLIGRAM(S): 400 CAPSULE ORAL at 06:02

## 2022-01-09 RX ADMIN — Medication 75 MICROGRAM(S): at 06:01

## 2022-01-09 RX ADMIN — HYDROMORPHONE HYDROCHLORIDE 4 MILLIGRAM(S): 2 INJECTION INTRAMUSCULAR; INTRAVENOUS; SUBCUTANEOUS at 00:02

## 2022-01-09 RX ADMIN — HYDROMORPHONE HYDROCHLORIDE 4 MILLIGRAM(S): 2 INJECTION INTRAMUSCULAR; INTRAVENOUS; SUBCUTANEOUS at 09:38

## 2022-01-09 RX ADMIN — HYDROMORPHONE HYDROCHLORIDE 4 MILLIGRAM(S): 2 INJECTION INTRAMUSCULAR; INTRAVENOUS; SUBCUTANEOUS at 10:20

## 2022-01-09 RX ADMIN — Medication 975 MILLIGRAM(S): at 06:01

## 2022-01-09 NOTE — PROVIDER CONTACT NOTE (OTHER) - ASSESSMENT
HR has been sustained between 103-115 overnight at rest. Current /65 . Pt states that "she feels shaky" and has been taking metoprolol for 25 years to control her HR.

## 2022-01-09 NOTE — DISCHARGE NOTE NURSING/CASE MANAGEMENT/SOCIAL WORK - NSDCPEFALRISK_GEN_ALL_CORE
For information on Fall & Injury Prevention, visit: [email or web address] OR  [email or web address] OR  https://www.cdc.gov/steadi/patient.html

## 2022-01-09 NOTE — PROGRESS NOTE ADULT - SUBJECTIVE AND OBJECTIVE BOX
Ortho Note    Pt seen and examined. Complaining of jitteriness and anxiety, asking for home metoprolol. Pain controlled.  Denies CP, SOB, N/V, numbness/tingling     Vital Signs Last 24 Hrs  T(C): 36.6 (09 Jan 2022 06:00), Max: 36.7 (08 Jan 2022 14:16)  T(F): 97.8 (09 Jan 2022 06:00), Max: 98.1 (08 Jan 2022 14:16)  HR: 110 (09 Jan 2022 06:00) (88 - 110)  BP: 120/65 (09 Jan 2022 06:00) (109/57 - 127/63)  BP(mean): --  RR: 18 (09 Jan 2022 06:00) (17 - 18)  SpO2: 96% (09 Jan 2022 06:00) (96% - 99%)    General: Pt Alert and oriented, NAD  DSG- prineo C/D/I, cervical collar in place; HV x 1  Pulses: +2RP, WWP fingers  Sensation: SILT BUE  Motor: 5/5 /bicep/tricep/deltoid BUE             A/P: 66y Female s/p C2-3 PCF w reinstrumentation C3-7  - VSS, Labs WNL  - Pain Control- appreciate pain management recs  - DVT ppx: SCDs  - PT, WBS: WBAT with cervical collar  - OOB for meals, I/S  - continue bowel regimen  - Post op xrays done  - Monitor HV - 40/59  - Dispo: Cleared OT    Ortho Pager [phone]

## 2022-01-09 NOTE — DISCHARGE NOTE NURSING/CASE MANAGEMENT/SOCIAL WORK - NSDCPEPTCAREGIVEDUMATLIST _GEN_ALL_CORE
inhaler Inhale 1 puff into the lungs every 6 hours as needed for Shortness of Breath 1 Inhaler 0    Blood Pressure Monitoring (BLOOD PRESSURE CUFF) MISC Use as directed 1 each 1    atorvastatin (LIPITOR) 40 MG tablet Take 1 tablet by mouth daily 90 tablet 1    lisinopril (PRINIVIL;ZESTRIL) 20 MG tablet Take 1 tablet by mouth daily 90 tablet 1    metFORMIN (GLUCOPHAGE) 500 MG tablet TAKE 1 TABLET BY MOUTH TWICE DAILY WITH MEALS 180 tablet 1    diclofenac 2 % SOLN Place 2 applicators onto the skin as needed (as needed for pain) 1 g 0    tamsulosin (FLOMAX) 0.4 MG capsule Take 2 capsules once daily 180 capsule 1    Antiseptic Products, Misc. (IV PREP WIPES) 70 % PADS       Ostomy Supplies (BRAVA ADHESIVE REMOVER WIPE) MISC As directed 30 each 0    prazosin (MINIPRESS) 5 MG capsule Take 5 mg by mouth nightly      busPIRone (BUSPAR) 15 MG tablet Take 15 mg by mouth 3 times daily      triamcinolone (KENALOG) 0.1 % cream Apply topically 2 times daily Apply topically 2 times daily.  salicylic acid 6 % gel Apply topically daily Apply topically daily.  polyethylene glycol (MIRALAX) PACK packet Take 17 g by mouth daily      Alcohol Swabs PADS by Does not apply route      rifaximin (XIFAXAN) 550 MG tablet Take 550 mg by mouth 2 times daily      cyclobenzaprine (FLEXERIL) 10 MG tablet Take 10 mg by mouth 3 times daily as needed for Muscle spasms      naproxen (NAPROSYN) 500 MG tablet Take 500 mg by mouth 2 times daily (with meals)      clobetasol (TEMOVATE) 0.05 % cream Apply topically 2 times daily Apply topically 2 times daily.  Suvorexant (BELSOMRA) 20 MG TABS Take by mouth. Loralyn Rattler hydroxychloroquine (PLAQUENIL) 200 MG tablet Take by mouth daily      Transparent Dressings MISC by Does not apply route      insulin aspart (NOVOLOG) 100 UNIT/ML injection vial Inject into the skin 3 times daily (before meals)      buPROPion (WELLBUTRIN SR) 100 MG extended release tablet Take 200 mg by mouth 2
Influenza Vaccination

## 2022-01-09 NOTE — DISCHARGE NOTE NURSING/CASE MANAGEMENT/SOCIAL WORK - PATIENT PORTAL LINK FT
You can access the FollowMyHealth Patient Portal offered by [facility] by registering at the following website: [email or web address]. By joining [facility]’s FollowMyHealth portal, you will also be able to view your health information using other applications (apps) compatible with our system.

## 2022-01-09 NOTE — PROVIDER CONTACT NOTE (OTHER) - ACTION/TREATMENT ORDERED:
Yusuf Blackmon MD made aware of elevated HR and current BP of 120/65. Informed that its okay to give Metoprolol 200mg PO.

## 2022-01-13 DIAGNOSIS — Z98.1 ARTHRODESIS STATUS: ICD-10-CM

## 2022-01-13 DIAGNOSIS — M43.12 SPONDYLOLISTHESIS, CERVICAL REGION: ICD-10-CM

## 2022-01-13 DIAGNOSIS — F41.0 PANIC DISORDER [EPISODIC PAROXYSMAL ANXIETY]: ICD-10-CM

## 2022-01-13 DIAGNOSIS — M53.2X2 SPINAL INSTABILITIES, CERVICAL REGION: ICD-10-CM

## 2022-01-13 DIAGNOSIS — E03.9 HYPOTHYROIDISM, UNSPECIFIED: ICD-10-CM

## 2022-01-13 DIAGNOSIS — M48.02 SPINAL STENOSIS, CERVICAL REGION: ICD-10-CM

## 2022-01-13 DIAGNOSIS — M62.838 OTHER MUSCLE SPASM: ICD-10-CM

## 2022-02-11 PROCEDURE — 36415 COLL VENOUS BLD VENIPUNCTURE: CPT

## 2022-02-11 PROCEDURE — 97161 PT EVAL LOW COMPLEX 20 MIN: CPT

## 2022-02-11 PROCEDURE — 85025 COMPLETE CBC W/AUTO DIFF WBC: CPT

## 2022-02-11 PROCEDURE — C1713: CPT

## 2022-02-11 PROCEDURE — 80048 BASIC METABOLIC PNL TOTAL CA: CPT

## 2022-02-11 PROCEDURE — 86850 RBC ANTIBODY SCREEN: CPT

## 2022-02-11 PROCEDURE — C1889: CPT

## 2022-02-11 PROCEDURE — 86900 BLOOD TYPING SEROLOGIC ABO: CPT

## 2022-02-11 PROCEDURE — 76000 FLUOROSCOPY <1 HR PHYS/QHP: CPT

## 2022-02-11 PROCEDURE — 72040 X-RAY EXAM NECK SPINE 2-3 VW: CPT

## 2022-02-11 PROCEDURE — 86901 BLOOD TYPING SEROLOGIC RH(D): CPT

## 2022-07-29 NOTE — PRE-OP CHECKLIST - AICD PRESENT
[facility] -  PROGRESS NOTE               Author: Bebeto Gregory M.D., CNN-NP Date & Time: 7/29/2022  9:09 AM     HPI:  81yoF with PMH significant for ESRD on HD MWF via left arm AVF, HTN, DM II, Anemia secondary to CKD, CKD Bone mineral disorder, admitted with increased edema and weakness and having missed her last last 2-3 outpt HD treatments. Pt is very lethargic at this time and unable to provide much history, majority of the history was obtained through review of the medical record and discussion with primary svc. Pt had reported increased LE edema and fatigue and weakness and worsening of her left arm edema so she came to the ED for evaluation. She apparently has missed her last 2-3 outpt dialysis treatments. Per regular outpt Nephrologist Dr. Gates, she has been non-compliant with her fluid restriction and was told that she needed 4x/week dialysis until her volume status could be optimized but she was non-compliant with that recommendation. She has edema of her left arm where her AVF is located and there is concern for a central stenosis that could be the etiology. She had an appointment at the outpt access center to evaluate for central stenosis and undergo angioplasty if needed on 6/9/22 but pt did not go to the appointment. She has not had any other procedures done to the arm in the past couple of months. She had a left arm us done today that showed no DVT and patent AVF and soft tissue edema. She apparently received pain medication fentanyl and dilaudid as well as benadryl earlier today and she is very drowsy.      DAILY NEPHROLOGY SUMMARY:  6/25: rapid response overnight for severe leg pain, pt very lethargic today, moans with palpation of legs, arouses but does not answer questions and falls back asleep, tolerated HD yest with 2.5L UF, BP stable overnight but low this am  6/26: No events, BP low overnight and this am, more alert, reports pain in legs for the past 3 weeks,  denies any CP/SOB/Abd pain, reports left arm edema a little better but no pain in left arm   6/27: Pt resting in bed, subdued, Bps soft, on 4L supp O2 via NC, labs reviewed, due for HD  6/28: pt laying in bed, sleepy, c/o pain in legs, moaning, vascular assessed and does not recommend any intervention on legs or AVF, tolerated iHD yesterday with UF:2.8L  6/29: Pt in bed, fatigued, no complaints.  VSS 1L NC.    6/30: no new complaints, no overnight events. Tolerated hd yesterday, UF 3.5L , she is in negative balance.   7/1: Resting in bed, no complaints.  VSS 2L NC. No new labs today.   7/2: Pt sitting up in bed, confused, CNA helping her with breakfast meal, iHD yesterday with 1.5L net UF limited by muscle cramping, labs reviewed, VSS on 1L NC O2  7/3: Pt slumped in bed, somnolent, medical floor status, VSS on RA  7/4: No new overnight renal events. S/p HD yesterday and tolerated well. Net UF was 1.5 L.  7/5: S/p HD yesterday with net UF of 2 L. No new overnight renal events.   7/6: No new overnight renal events.   7/7: S/p HD yesterday with net UF of 2.2L and tolerated well. No new overnight renal events.   7/8: No new overnight renal events.   7/9: S/p HD yesterday with net UF of 3L and tolerated well. No new overnight renal events.   7/10: No new overnight renal events. K+ 6.9 this am.  7/11: No events, HD yest for hyperkalemia, K 5.9 this am, denies any CP/SOB but reports leg pain, BP stable, seen and examined on HD this am--VSS see dialysis flowsheet for full details  7/12: No events, awake and alert, c/o bilateral leg pain, denies any CP/SOB, BP stable  7/13: No events, seen on dialysis this am, still c/o leg pain, denies any CP/SOB, BP stable  7/14: No events, feels tired, c/o leg pain, denies any CP/SOB, tolerated HD yest with 2L UF  7/15: No events, BP stable, seen on HD, still complaining of leg pain, no CP/SOB  7/16: No events, tolerated HD yest with 3L UF, BP stable, complaining of soft stools and left  "arm pain and leg pain, no CP/SOB/Abd pain  7/17: No overnight events, VSS no CP SOB worsening edema. Na 131 K 5.2, 2L NC.  Nurse reports she was fatigued and disoriented this morning, but has improved.  Currently arouses easily, is oriented.   7/18: Pt seen on HD, says her back hurts, VSS on 2L NC O2, K+ 5.8 this AM  7/19: Pt sitting up in bed. No overnight events. Tolerated HD yesterday with 3L UF, BP stable. K this am better 4.2. No CP/N/V/D.   7/20: Seen during HD, tolerated well. VSS on 2L NC. Denies SOB, N/V/D. Stated left lower hand pain, open blisters noted, says also her back hurts.  7/21: HD yest UF 3L. Dtr at bedside. Pt c/o significant pain/burning in left hand.  7/22: seen on iHd, continues with + edema in LUE, + edema BLE edema  7/23: HD yesterday 3 liter uf  7/24: Decreased vision in R eye, but chornic, worse edema in left arm  7/25: Sleeping on HD.  Comfortable.  Left arm edema  7/26: Denies pain or SOB.  Comfortable.    7/27: seen on iHD, pleasantly confused, LUE edema improved   7/28: Denies pain or SOB.  Slight bleeding around fistula but family had pulled off dressing  7/29: Complaining of hand pain still.  No SOB.  States sat in chair yesterday.    REVIEW OF SYSTEMS:    10 point ROS reviewed and is as per HPI/daily summary or otherwise negative    PMH/PSH/SH/FH:   Reviewed and unchanged since admission note    CURRENT MEDICATIONS:   Reviewed from admission to present day    VS:  /52   Pulse 72   Temp 36.2 °C (97.2 °F) (Temporal)   Resp 15   Ht 1.626 m (5' 4\")   Wt 60.2 kg (132 lb 11.5 oz)   SpO2 100%   BMI 22.78 kg/m²     Physical Exam  Vitals and nursing note reviewed.   Constitutional:       General: She is not in acute distress.     Appearance: She is ill-appearing.   HENT:      Head: Normocephalic and atraumatic.      Mouth/Throat:      Mouth: Mucous membranes are dry.   Eyes:      General: No scleral icterus.  Cardiovascular:      Rate and Rhythm: Normal rate and regular rhythm. "      Pulses: Normal pulses.   Pulmonary:      Effort: Pulmonary effort is normal. No respiratory distress.   Abdominal:      General: There is no distension.      Palpations: Abdomen is soft.   Musculoskeletal:         General: Swelling (left arm) present. No deformity.      Right lower leg: No edema.      Left lower leg: No edema.      Comments: Trace LE edema  LUE AVF + bruit and Thrill   Skin:     General: Skin is warm and dry.      Findings: No rash.      Comments: Left hand gauze wrapped    Neurological:      Mental Status: She is alert.           Fluids:  In: 990 [P.O.:990]  Out: -     LABS:  Recent Labs     07/27/22  0950   SODIUM 132*   POTASSIUM 5.8*   CHLORIDE 93*   CO2 23   GLUCOSE 112*   BUN 45*   CREATININE 4.30*   CALCIUM 8.5     Reviewed.    IMAGING:   All imaging reviewed from admission to present day    IMPRESSION:  # ESRD, dependent on HD              - HD via LUE AVF  # Fluid overload, improved             - Secondary to non-compliance with fluid restriction and HD treatments  # Left arm edema, slow improvement              - CTA upper ext 6/26 w/o e/o focal stenosis + extensive edema             - AVF patent on left arm us and no DVT             - Pt no showed to outpt appt to evaluate              - Vascular does not recommend intervention              - Extensor tendon laceration left hand with wound  # HTN, variable control, stable at this time             - Goal BP < 140/90             - Not on BP meds   # Anemia of CKD, at goal              - Goal Hgb 10-11             - Iron 27             -TIBC 142             -%Sat 19             - Ferritin 1419  # CKD-MBD             - Ca 9.7             - PO4 5.2             - Managed at OP unit             - On calcitriol and sevelamer   # DM II--management per primary svc  # Leg Pain--chronic skin changes and subcutaneous tissue firm to touch             - Unclear etiology             - Vascular does not recommend any intervention   - On ropinirole    # Leukocytosis resolved     PLAN:  - Continue qMWF iHD schedule   - UF as tolerated  - Continue phos binders WM  - Continue off of all BP meds and diuretics for now. BP stable during HD.   - Limit sedating meds if possible  - No dietary protein restrictions  - Dose all meds per ESRD  - Outpt access center appointment rescheduled  - DC planning underway for SNF.      Thank you,   no

## 2022-08-10 NOTE — PRE-OP CHECKLIST - SELECT TESTS ORDERED
BMP/Urinalysis/EKG/PT/PTT/INR/CXR/CBC Otezla Pregnancy And Lactation Text: This medication is Pregnancy Category C and it isn't known if it is safe during pregnancy. It is unknown if it is excreted in breast milk.

## 2022-09-13 NOTE — DISCHARGE NOTE PROVIDER - DISCHARGE DATE
Sendy Naidu 29 y.o. male was seen by JEN Myers on 9/13/2022     Wt Readings from Last 3 Encounters:   09/13/22 255 lb (115.7 kg)   08/26/22 248 lb 3.2 oz (112.6 kg)   02/01/22 239 lb (108.4 kg)       JARED Naidu is a pleasant 29 y.o.  male who presents today for GERD. He has a past medical history of GERD without esophagitis, hyperlipidemia, hypertension, and impaired fasting glucose. He denies alcohol use. He has never had an EGD. His appetite is good without early satiety. His weight is stable. His heartburn and acid reflux has been ongoing for over five years. His heartburn and acid reflux is controlled with Protonix. He was on Prilosec for four to five years. No nocturnal awakenings with acid reflux the in last couple weeks; prior to that he was waking up every other night with acid reflux. No dysphagia or pain with swallowing. No abdominal pain, bloating or distention. No excess belching or flatulence. He denies changes in his bowel pattern. His typical bowel pattern is every other day with soft brown formed stools. No diarrhea or constipation. No blood in his stools or melena. No family history of stomach or colon cancer. His maternal uncle had colon cancer at age 72. ROS  Review of Systems   Constitutional:  Negative for appetite change, chills, diaphoresis, fever and unexpected weight change. HENT:  Negative for ear pain and hearing loss. Eyes:  Positive for visual disturbance. Negative for pain and discharge. Respiratory:  Negative for cough, shortness of breath and wheezing. Cardiovascular:  Negative for chest pain, palpitations and leg swelling. Gastrointestinal:  Negative for abdominal pain, blood in stool, constipation, diarrhea, nausea and vomiting. Endocrine: Negative for cold intolerance and heat intolerance. Genitourinary:  Negative for dysuria, frequency, hematuria and urgency.    Musculoskeletal:  Negative for back pain, myalgias and neck pain. Skin:  Negative for color change, pallor and rash. Allergic/Immunologic: Negative for environmental allergies and food allergies. Neurological:  Negative for dizziness, seizures, weakness and headaches. Hematological:  Does not bruise/bleed easily. Psychiatric/Behavioral:  Negative for dysphoric mood and sleep disturbance. The patient is not nervous/anxious. Allergies  No Known Allergies    Medications  Current Outpatient Medications   Medication Sig Dispense Refill    lisinopril-hydroCHLOROthiazide (PRINZIDE;ZESTORETIC) 20-25 MG per tablet TAKE 1 TABLET BY MOUTH EVERY DAY 30 tablet 4    pantoprazole (PROTONIX) 40 MG tablet Take 1 tablet by mouth daily 30 tablet 4     No current facility-administered medications for this visit. Past medical history:   He has a past medical history of GERD without esophagitis, Hyperlipidemia, Hypertension, and Impaired fasting glucose. Past surgical history:  He has no past surgical history on file. Social History:  He reports that he has never smoked. He has never used smokeless tobacco. He reports current alcohol use. He reports that he does not use drugs. Family history:  His family history is not on file. Objective    Vitals:    09/13/22 1318   BP: (!) 156/92   Pulse: 75   Temp: 97.1 °F (36.2 °C)   SpO2: 98%        Physical exam    Physical Exam  Constitutional:       General: He is not in acute distress. Appearance: Normal appearance. He is well-developed. He is obese. He is not ill-appearing, toxic-appearing or diaphoretic. HENT:      Head: Normocephalic and atraumatic. Nose: Nose normal.      Mouth/Throat:      Mouth: Mucous membranes are moist.   Cardiovascular:      Rate and Rhythm: Normal rate and regular rhythm. Pulses: Normal pulses. Heart sounds: Normal heart sounds. No murmur heard. No gallop. Pulmonary:      Effort: Pulmonary effort is normal. No respiratory distress.       Breath sounds: Normal breath sounds. No stridor. No wheezing or rhonchi. Abdominal:      General: Bowel sounds are normal. There is no distension. Palpations: Abdomen is soft. There is no mass. Tenderness: There is no abdominal tenderness. Hernia: No hernia is present. Musculoskeletal:         General: Normal range of motion. Cervical back: Neck supple. Skin:     General: Skin is warm and dry. Neurological:      Mental Status: He is alert and oriented to person, place, and time. Psychiatric:         Mood and Affect: Mood normal.       No visits with results within 2 Month(s) from this visit. Latest known visit with results is:   Office Visit on 09/01/2021   Component Date Value Ref Range Status    Hemoglobin A1C 09/01/2021 6.2  See comment % Final    Comment: Comment:  Diagnosis of Diabetes: > or = 6.5%  Increased risk of diabetes (Prediabetes): 5.7-6.4%  Glycemic Control: Nonpregnant Adults: <7.0%                    Pregnant: <6.0%        eAG 09/01/2021 131.2  mg/dL Final    Sodium 09/01/2021 140  136 - 145 mmol/L Final    Potassium 09/01/2021 3.9  3.5 - 5.1 mmol/L Final    Chloride 09/01/2021 101  99 - 110 mmol/L Final    CO2 09/01/2021 26  21 - 32 mmol/L Final    Anion Gap 09/01/2021 13  3 - 16 Final    Glucose 09/01/2021 93  70 - 99 mg/dL Final    BUN 09/01/2021 9  7 - 20 mg/dL Final    Creatinine 09/01/2021 1.1  0.9 - 1.3 mg/dL Final    GFR Non- 09/01/2021 >60  >60 Final    Comment: >60 mL/min/1.73m2 EGFR, calc. for ages 25 and older using the  MDRD formula (not corrected for weight), is valid for stable  renal function. GFR  09/01/2021 >60  >60 Final    Comment: Chronic Kidney Disease: less than 60 ml/min/1.73 sq.m. Kidney Failure: less than 15 ml/min/1.73 sq.m. Results valid for patients 18 years and older.       Calcium 09/01/2021 9.8  8.3 - 10.6 mg/dL Final    Total Protein 09/01/2021 7.8  6.4 - 8.2 g/dL Final    Albumin 09/01/2021 4.6  3.4 - 5.0 g/dL Final    Albumin/Globulin Ratio 09/01/2021 1.4  1.1 - 2.2 Final    Total Bilirubin 09/01/2021 0.5  0.0 - 1.0 mg/dL Final    Alkaline Phosphatase 09/01/2021 97  40 - 129 U/L Final    ALT 09/01/2021 23  10 - 40 U/L Final    AST 09/01/2021 20  15 - 37 U/L Final    Globulin 09/01/2021 3.2  g/dL Final    Cholesterol, Total 09/01/2021 201 (A) 0 - 199 mg/dL Final    Triglycerides 09/01/2021 245 (A) 0 - 150 mg/dL Final    HDL 09/01/2021 34 (A) 40 - 60 mg/dL Final    LDL Calculated 09/01/2021 118 (A) <100 mg/dL Final    VLDL Cholesterol Calculated 09/01/2021 49  Not Established mg/dL Final       Assessment and Plan:  1. Will plan for a EGD with MAC anesthesia. The patient was informed of the risks and benefits of the procedure. 2.  GERD that is stable without dysphagia or odynophagia. Will order EGD to rule out Sigala's esophagus. The patient was encouraged to continue taking Protonix daily for treatment of acid reflux. The patient was encouraged to continue with acid reflux measures and avoid foods that trigger. 3.  Further recommendations for follow-up will be determined after the EGD has been completed. 09-Jan-2022

## 2023-05-16 NOTE — DISCHARGE NOTE PROVIDER - BRAND OF COVID-19 VACCINATION
[FreeTextEntry1] : 74 y/o female, former smoker, with HFpEF (LVEF 50-55%), moderate aortic stenosis, atrial fibrillation s/p DCCV on 12/2022 and RFA 2/13/23, HTN, HLD, DM2, chronic PVD/prior vascular ulcers (followed by Dr. Yoo), morbid obesity/prior lap band (since removed), who presents for HF follow up.  \par \par She has ACC/AHA stage C HFpEF with  NYHA II symptoms.  \par \par # HFpEF \par - on losartan 25 mg po daily (Entresto was cost prohibitive)\par - she was ordered for metoprolol 50mg XL daily however, she has been taking 100mg XL daily.  HR today in clinic 48.  Will have her decrease her metoprolol aback to 50mg XL daily.  \par - continue on spironolactone 25mg daily.\par - No longer on Jardiance in setting of recurrent UTI. \par - Diuretics: Lasix 40mg daily PRN.\par - She may be a candidate for a CardioMEMS device.  However, she defers this for now. \par \par # Moderate Aortic Stenosis\par  -Only moderate on TTE (ADRIAN 1.1, PPG 36.5, DI 0.36). No significant gradient LHC. ASHLEY showed ADRIAN 0.8, DI 0.26 MG not.\par - Dobutamine stress echo was suboptimal based on HR response with  MG 33, Vmax 3.9cm.\par - repeat echocardiogram post post ablation with preserved LVEF however, with still moderate AS.\par - patient agreeable to pursue cardiac CTA to assess calcium score of aortic valve. \par - patient deferred enrollment into the EXPAND trial but is willing to undergo TAVR if she ever develops severe AS. \par \par # Atrial Fibrillation \par - S/p unsuccessful  DCCV on 12/22, followed by afib ablation on 2/13/22.  Now off amiodarone.  BB as above.\par - s/p MCOT, results pending.  \par - On Eliquis 5mg BID.\par \par Follow up in 2 weeks with PA and then with me in 2 months/ or soon post CTA.\par  Moderna dose 1 and 2

## 2023-08-09 NOTE — SWALLOW VFSS/MBS ASSESSMENT ADULT - PHARYNGEAL PHASE COMMENTS
Delayed swallow trigger on the 1st tsp trial of thin with bolus loss to the level of the pyriform sinuses before the swallow. More timely swallow with cup sips thin & puree trials. Hyolaryngeal excursion is WNL. BOT retraction is WNL, but anterior squeeze is reduced/nearly absent from the posterior pharyngeal wall. Despite intact epiglottic retroflexion, there is trace-mild residue in the valleculae with thin & puree after the swallow. Trace residue in the pyriform sinuses with thin liquid only contributed to one episode trace penetration. There was no aspiration observed in this exam.
Speaking Coherently

## 2024-05-11 NOTE — CONSULT NOTE ADULT - SUBJECTIVE AND OBJECTIVE BOX
1. This is most likely a viral sinus infection. Your body will usually fight these off within 7-10 days.      2. Medications that can help with sinus infections include guaifenesin (Mucinex), ibuprofen, and tylenol. Saline nasal sprays help flush the infection out of the sinuses. If you can tolerate it, Neti pots work very well for sinus infections.    3. Nasal sprays like Flonase (fluticasone) can relieve congestion. Start with 2 sprays in each nostril until symptoms improve, then you can continue with 1 spray in each nostril for as long as needed. Afrin (oxymetazoline) can also relieve congestion, but do not use it for more than 3 days as after that it can make the congestion worse.      4. Drink lots of fluids. This helps your body flush out the infection. Rest as much as you can.     5. If you develop high fevers (100.4 or higher), increasing facial pain, symptoms last longer than 10 days, or you have other new or worsening symptoms, please come back to be seen.   
HPI:  63 year old female with spinal stenosis cervical spine confirmed with MRI. Patient with moderate neck pain worse with twisting motions and persists over time.  No relief with PT or conservative therapy.  Patient s/p ACDF C3-C7 with moderate neck pain and malaise.    PAST MEDICAL & SURGICAL HISTORY:  Cervical stenosis of spine  Denies DM HBP PUD ASTHMA  allergic rhinitis  Surgery, elective: infertility procedures  Surgery, elective: shoulder -right x 3  Surgery, elective: left foot x 6 surgeries      REVIEW OF SYSTEMS    General:  malaise	  Skin/Breast: normal  Ophthalmologic: negative  ENMT:	normal  Respiratory and Thorax: normal  Cardiovascular:	normal  Gastrointestinal:	normal  Genitourinary:	normal  Musculoskeletal: neck pain  Neurological:	normal  Psychiatric:	normal  Hematology/Lymphatics:	 negative  Endocrine:	negative  Allergic/Immunologic:	negative      MEDICATIONS  (STANDING):  BUpivacaine liposome 1.3% Injectable (no eMAR) 20 milliLiter(s) Local Injection once  ceFAZolin  Injectable. 2000 milliGRAM(s) IV Push every 8 hours  docusate sodium 100 milliGRAM(s) Oral three times a day  lactated ringers. 1000 milliLiter(s) (100 mL/Hr) IV Continuous <Continuous>  levothyroxine 75 MICROGram(s) Oral daily  metoprolol succinate  milliGRAM(s) Oral daily  multivitamin 1 Tablet(s) Oral daily  senna 2 Tablet(s) Oral at bedtime    MEDICATIONS  (PRN):  acetaminophen   Tablet .. 650 milliGRAM(s) Oral every 6 hours PRN Temp greater or equal to 38C (100.4F), Mild Pain (1 - 3)  cyclobenzaprine 5 milliGRAM(s) Oral three times a day PRN Muscle Spasm  famotidine    Tablet 20 milliGRAM(s) Oral every 12 hours PRN Dyspepsia  HYDROmorphone  Injectable 0.5 milliGRAM(s) IV Push every 2 hours PRN breakthrough pain  HYDROmorphone  Injectable 0.5 milliGRAM(s) IV Push once PRN breakthrough pain  HYDROmorphone  Injectable 0.5 milliGRAM(s) IV Push every 15 minutes PRN Breakthrough Pain in PACU  HYDROmorphone  Injectable 0.5 milliGRAM(s) IV Push once PRN breakthrough pain  magnesium hydroxide Suspension 30 milliLiter(s) Oral every 12 hours PRN Constipation  naloxone Injectable 0.1 milliGRAM(s) IV Push every 3 minutes PRN For ANY of the following changes in patient status:  A. RR LESS THAN 10 breaths per minute, B. Oxygen saturation LESS THAN 90%, C. Sedation score of 6  ondansetron Injectable 4 milliGRAM(s) IV Push every 6 hours PRN Nausea  oxyCODONE    IR 5 milliGRAM(s) Oral every 4 hours PRN Moderate Pain (4 - 6)  oxyCODONE    IR 10 milliGRAM(s) Oral every 4 hours PRN Severe Pain (7 - 10)    Allergies    No Known Allergies    SOCIAL HISTORY: no cigs social alcohol    FAMILY HISTORY: non contributory    PHYSICAL EXAM:  Daily Height in cm: 152.4 (22 Aug 2019 06:52)       Vital Signs Last 24 Hrs  T(C): 36.2 (22 Aug 2019 14:30), Max: 36.4 (22 Aug 2019 06:52)  T(F): 97.2 (22 Aug 2019 14:30), Max: 97.5 (22 Aug 2019 06:52)  HR: 74 (22 Aug 2019 15:45) (70 - 82)  BP: 123/76 (22 Aug 2019 15:45) (119/76 - 134/74)  BP(mean): 95 (22 Aug 2019 15:45) (90 - 106)  RR: 14 (22 Aug 2019 15:45) (14 - 21)  SpO2: 99% (22 Aug 2019 15:45) (97% - 99%)    Constitutional: WDWNF    Eyes: conj pale  ENMT: negative  Neck: decreased ROM  Breasts: not examined   Back: negative  Respiratory: clear to P&A  Cardiovascular: no MRGT or H  Gastrointestinal: normal bowel sounds  Genitourinary: neg  Rectal: not examined  Extremities: normal  Vascular: normal  Neurological: normal  Skin: negative  Lymph Nodes: negative  Musculoskeletal:   normal  Psychiatric: anxiety      LABS:                        11.3   9.01  )-----------( 212      ( 22 Aug 2019 15:50 )             32.6     08-22    140  |  106  |  13  ----------------------------<  118<H>  3.8   |  23  |  0.67    Ca    8.2<L>      22 Aug 2019 15:50
Pain Management Consult Note - [facility] [phone]) [phone]    Chief Complaint: Neck Pain    HPI: Patient seen and examined today, patient in nad. Patient s/p ACDF C3-C7. Patient reports neck and surgical site pain. Patient with soft cervical collar in place, in nad.  Reviewed pain medication regimen with patient.       Pain is ___ sharp __x__dull ___burning _x__achy ___ Intensity: ____ mild _x__mod x___severe     Location _x___surgical site _x___cervical _____lumbar ____abd ____upper ext____lower ext    Worse with __x__activity _x___movement _____physical therapy___ Rest    Improved with _x___medication _x___rest ____physical therapy      ROS: Const: - ___febrile   Eyes:___ENT:___CV: _-__chest pain  Resp: __-__sob  GI:_-__nausea _-__vomiting -_abd pain _x__npo ___clears __full diet __bm  :___ Musk: _x__pain _x__spasm  Skin:___ Neuro:  _-__fumvhvkd_-__llgehyrmz_-__ numbness _-__weakness __-_paresth  Psych:__anxiety  Endo:___ Heme:___Allergy:_________, __x_all others reviewed and negative      PAST MEDICAL & SURGICAL HISTORY:  Cervical stenosis of spine  Surgery, elective: infertility procedures  Surgery, elective: shoulder -right x 3  Surgery, elective: left foot x 6 surgeries      SH: _-__Tobacco   _-__Alcohol                          FH:FAMILY HISTORY:      acetaminophen   Tablet .. 650 milliGRAM(s) Oral every 6 hours PRN  BUpivacaine liposome 1.3% Injectable (no eMAR) 20 milliLiter(s) Local Injection once  ceFAZolin  Injectable. 2000 milliGRAM(s) IV Push every 8 hours  cyclobenzaprine 5 milliGRAM(s) Oral three times a day PRN  docusate sodium 100 milliGRAM(s) Oral three times a day  famotidine    Tablet 20 milliGRAM(s) Oral every 12 hours PRN  HYDROmorphone  Injectable 0.5 milliGRAM(s) IV Push every 2 hours PRN  HYDROmorphone  Injectable 0.5 milliGRAM(s) IV Push once PRN  HYDROmorphone  Injectable 0.5 milliGRAM(s) IV Push every 15 minutes PRN  HYDROmorphone  Injectable 0.5 milliGRAM(s) IV Push once PRN  lactated ringers. 1000 milliLiter(s) IV Continuous <Continuous>  levothyroxine 75 MICROGram(s) Oral daily  magnesium hydroxide Suspension 30 milliLiter(s) Oral every 12 hours PRN  metoprolol succinate  milliGRAM(s) Oral daily  multivitamin 1 Tablet(s) Oral daily  naloxone Injectable 0.1 milliGRAM(s) IV Push every 3 minutes PRN  ondansetron Injectable 4 milliGRAM(s) IV Push every 6 hours PRN  oxyCODONE    IR 5 milliGRAM(s) Oral every 4 hours PRN  oxyCODONE    IR 10 milliGRAM(s) Oral every 4 hours PRN  senna 2 Tablet(s) Oral at bedtime      T(C): 36.2 (08-22-19 @ 14:30), Max: 36.4 (08-22-19 @ 06:52)  HR: 74 (08-22-19 @ 15:45) (70 - 82)  BP: 123/76 (08-22-19 @ 15:45) (119/76 - 134/74)  RR: 14 (08-22-19 @ 15:45) (14 - 21)  SpO2: 99% (08-22-19 @ 15:45) (97% - 99%)  Wt(kg): --    T(C): 36.2 (08-22-19 @ 14:30), Max: 36.4 (08-22-19 @ 06:52)  HR: 74 (08-22-19 @ 15:45) (70 - 82)  BP: 123/76 (08-22-19 @ 15:45) (119/76 - 134/74)  RR: 14 (08-22-19 @ 15:45) (14 - 21)  SpO2: 99% (08-22-19 @ 15:45) (97% - 99%)  Wt(kg): --    T(C): 36.2 (08-22-19 @ 14:30), Max: 36.4 (08-22-19 @ 06:52)  HR: 74 (08-22-19 @ 15:45) (70 - 82)  BP: 123/76 (08-22-19 @ 15:45) (119/76 - 134/74)  RR: 14 (08-22-19 @ 15:45) (14 - 21)  SpO2: 99% (08-22-19 @ 15:45) (97% - 99%)  Wt(kg): --      PHYSICAL EXAM:  Gen Appearance: _x__no acute distress _x__appropriate        Neuro: __x_SILT feet____ EOM Intact Psych: AAOX_3_, _x_mood/affect appropriate        Eyes: _x__conjunctiva WNL  __x___ Pupils equal and round        ENT: __x_ears and nose atraumatic_x__ Hearing grossly intact        Neck: x___trachea midline, no visible masses ___thyroid without palpable mass    Resp: _x__Nml WOB____No tactile fremitus ___clear to auscultation    Cardio: _x__extremities free from edema __x__pedal pulses palpable    GI/Abdomen: _x__soft ___x__ Nontender___x___Nondistended_____HSM    Lymphatic: ___no palpable nodes in neck  ___no palpable nodes calves and feet    Skin/Wound: ___Incision, _x__Dressing c/d/i,   ____surrounding tissues soft,  ___drain/chest tube present____    Muscular: EHL _5__/5  Gastrocnemius_5__/5    ___absent clubbing/cyanosis        ASSESSMENT: This is a 63y old Female with a history of cervical spinal stenosis s/p ACDF C3-C7 pod#0.       Recommended Treatment PLAN:  1. Oxycodone 5-10mg PO Q4h prn moderate to severe pain  2. Dilaudid 0.5mg Q2h IVP prn breakthrough pain   3. Flexeril 5mg PO Q8h prn muscle spasms  Plan discussed with Dr. Parr

## 2024-09-06 ENCOUNTER — EMERGENCY (EMERGENCY)
Facility: HOSPITAL | Age: 69
LOS: 1 days | Discharge: HOME | End: 2024-09-06
Attending: STUDENT IN AN ORGANIZED HEALTH CARE EDUCATION/TRAINING PROGRAM | Admitting: STUDENT IN AN ORGANIZED HEALTH CARE EDUCATION/TRAINING PROGRAM
Payer: COMMERCIAL

## 2024-09-06 VITALS
HEIGHT: 61 IN | RESPIRATION RATE: 17 BRPM | DIASTOLIC BLOOD PRESSURE: 63 MMHG | TEMPERATURE: 98 F | SYSTOLIC BLOOD PRESSURE: 106 MMHG | HEART RATE: 102 BPM | OXYGEN SATURATION: 100 % | WEIGHT: 95.02 LBS

## 2024-09-06 VITALS
HEART RATE: 91 BPM | OXYGEN SATURATION: 98 % | SYSTOLIC BLOOD PRESSURE: 102 MMHG | TEMPERATURE: 99 F | RESPIRATION RATE: 16 BRPM | DIASTOLIC BLOOD PRESSURE: 68 MMHG
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
## 2024-09-06 DIAGNOSIS — Z41.9 ENCOUNTER FOR PROCEDURE FOR PURPOSES OTHER THAN REMEDYING HEALTH STATE, UNSPECIFIED: Chronic | ICD-10-CM

## 2024-09-06 DIAGNOSIS — Z98.1 ARTHRODESIS STATUS: ICD-10-CM

## 2024-09-06 DIAGNOSIS — F17.200 NICOTINE DEPENDENCE, UNSPECIFIED, UNCOMPLICATED: ICD-10-CM

## 2024-09-06 DIAGNOSIS — S12.14XA: ICD-10-CM

## 2024-09-06 DIAGNOSIS — M54.2 CERVICALGIA: ICD-10-CM

## 2024-09-06 DIAGNOSIS — Z98.890 OTHER SPECIFIED POSTPROCEDURAL STATES: Chronic | ICD-10-CM

## 2024-09-06 DIAGNOSIS — Z98.1 ARTHRODESIS STATUS: Chronic | ICD-10-CM
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
## 2024-09-06 LAB
ANION GAP SERPL CALC-SCNC: 10 MMOL/L — SIGNIFICANT CHANGE UP (ref 5–17)
APTT BLD: 20.4 SEC — LOW (ref 24.5–35.6)
BLD GP AB SCN SERPL QL: NEGATIVE — SIGNIFICANT CHANGE UP
BUN SERPL-MCNC: 21 MG/DL — SIGNIFICANT CHANGE UP (ref 7–23)
CALCIUM SERPL-MCNC: 9.6 MG/DL — SIGNIFICANT CHANGE UP (ref 8.4–10.5)
CHLORIDE SERPL-SCNC: 101 MMOL/L — SIGNIFICANT CHANGE UP (ref 96–108)
CO2 SERPL-SCNC: 28 MMOL/L — SIGNIFICANT CHANGE UP (ref 22–31)
CREAT SERPL-MCNC: 0.72 MG/DL — SIGNIFICANT CHANGE UP (ref 0.5–1.3)
EGFR: 91 ML/MIN/1.73M2 — SIGNIFICANT CHANGE UP
GLUCOSE SERPL-MCNC: 90 MG/DL — SIGNIFICANT CHANGE UP (ref 70–99)
HCT VFR BLD CALC: 31.7 % — LOW (ref 34.5–45)
HGB BLD-MCNC: 9.7 G/DL — LOW (ref 11.5–15.5)
INR BLD: 1.04 — SIGNIFICANT CHANGE UP (ref 0.85–1.18)
MCHC RBC-ENTMCNC: 23.2 PG — LOW (ref 27–34)
MCHC RBC-ENTMCNC: 30.6 GM/DL — LOW (ref 32–36)
MCV RBC AUTO: 75.7 FL — LOW (ref 80–100)
NRBC # BLD: 0 /100 WBCS — SIGNIFICANT CHANGE UP (ref 0–0)
PLATELET # BLD AUTO: 474 K/UL — HIGH (ref 150–400)
POTASSIUM SERPL-MCNC: 3.5 MMOL/L — SIGNIFICANT CHANGE UP (ref 3.5–5.3)
POTASSIUM SERPL-SCNC: 3.5 MMOL/L — SIGNIFICANT CHANGE UP (ref 3.5–5.3)
PROTHROM AB SERPL-ACNC: 11.8 SEC — SIGNIFICANT CHANGE UP (ref 9.5–13)
RBC # BLD: 4.19 M/UL — SIGNIFICANT CHANGE UP (ref 3.8–5.2)
RBC # FLD: 19.1 % — HIGH (ref 10.3–14.5)
RH IG SCN BLD-IMP: POSITIVE — SIGNIFICANT CHANGE UP
SODIUM SERPL-SCNC: 139 MMOL/L — SIGNIFICANT CHANGE UP (ref 135–145)
WBC # BLD: 12 K/UL — HIGH (ref 3.8–10.5)
WBC # FLD AUTO: 12 K/UL — HIGH (ref 3.8–10.5)

## 2024-09-06 PROCEDURE — 86900 BLOOD TYPING SEROLOGIC ABO: CPT

## 2024-09-06 PROCEDURE — 85730 THROMBOPLASTIN TIME PARTIAL: CPT

## 2024-09-06 PROCEDURE — 72125 CT NECK SPINE W/O DYE: CPT | Mod: 26,MC

## 2024-09-06 PROCEDURE — 72040 X-RAY EXAM NECK SPINE 2-3 VW: CPT | Mod: 26

## 2024-09-06 PROCEDURE — 86850 RBC ANTIBODY SCREEN: CPT

## 2024-09-06 PROCEDURE — 85610 PROTHROMBIN TIME: CPT

## 2024-09-06 PROCEDURE — 36415 COLL VENOUS BLD VENIPUNCTURE: CPT

## 2024-09-06 PROCEDURE — 72125 CT NECK SPINE W/O DYE: CPT | Mod: MC

## 2024-09-06 PROCEDURE — 86901 BLOOD TYPING SEROLOGIC RH(D): CPT

## 2024-09-06 PROCEDURE — 80048 BASIC METABOLIC PNL TOTAL CA: CPT

## 2024-09-06 PROCEDURE — 85027 COMPLETE CBC AUTOMATED: CPT

## 2024-09-06 PROCEDURE — 72040 X-RAY EXAM NECK SPINE 2-3 VW: CPT

## 2024-09-06 PROCEDURE — 99285 EMERGENCY DEPT VISIT HI MDM: CPT

## 2024-09-06 PROCEDURE — 96372 THER/PROPH/DIAG INJ SC/IM: CPT

## 2024-09-06 PROCEDURE — 99284 EMERGENCY DEPT VISIT MOD MDM: CPT | Mod: 25

## 2024-09-06 RX ORDER — ACETAMINOPHEN 325 MG/1
650 TABLET ORAL ONCE
Refills: 0 | Status: DISCONTINUED | OUTPATIENT
Start: 2024-09-06 | End: 2024-09-06

## 2024-09-06 RX ORDER — KETOROLAC TROMETHAMINE 30 MG/ML
15 INJECTION, SOLUTION INTRAMUSCULAR ONCE
Refills: 0 | Status: DISCONTINUED | OUTPATIENT
Start: 2024-09-06 | End: 2024-09-06

## 2024-09-06 RX ORDER — ONDANSETRON 2 MG/ML
4 INJECTION, SOLUTION INTRAMUSCULAR; INTRAVENOUS ONCE
Refills: 0 | Status: COMPLETED | OUTPATIENT
Start: 2024-09-06 | End: 2024-09-06

## 2024-09-06 RX ORDER — ACETAMINOPHEN 325 MG/1
650 TABLET ORAL ONCE
Refills: 0 | Status: COMPLETED | OUTPATIENT
Start: 2024-09-06 | End: 2024-09-06

## 2024-09-06 RX ORDER — DIAZEPAM 10 MG
2 TABLET ORAL ONCE
Refills: 0 | Status: DISCONTINUED | OUTPATIENT
Start: 2024-09-06 | End: 2024-09-06

## 2024-09-06 RX ORDER — DIAZEPAM 10 MG
2.5 TABLET ORAL ONCE
Refills: 0 | Status: DISCONTINUED | OUTPATIENT
Start: 2024-09-06 | End: 2024-09-06

## 2024-09-06 RX ORDER — ONDANSETRON 2 MG/ML
4 INJECTION, SOLUTION INTRAMUSCULAR; INTRAVENOUS ONCE
Refills: 0 | Status: DISCONTINUED | OUTPATIENT
Start: 2024-09-06 | End: 2024-09-06

## 2024-09-06 RX ADMIN — Medication 2 MILLIGRAM(S): at 20:31

## 2024-09-06 RX ADMIN — ONDANSETRON 4 MILLIGRAM(S): 2 INJECTION, SOLUTION INTRAMUSCULAR; INTRAVENOUS at 22:55

## 2024-09-06 RX ADMIN — ACETAMINOPHEN 650 MILLIGRAM(S): 325 TABLET ORAL at 22:55

## 2024-09-06 RX ADMIN — Medication 4 MILLIGRAM(S): at 22:55

## 2024-09-06 RX ADMIN — KETOROLAC TROMETHAMINE 15 MILLIGRAM(S): 30 INJECTION, SOLUTION INTRAMUSCULAR at 20:32

## 2024-09-06 NOTE — ED PROVIDER NOTE - PATIENT PORTAL LINK FT
You can access the FollowMyHealth Patient Portal offered by [facility] by registering at the following website: [email or web address]. By joining [facility]’s FollowMyHealth portal, you will also be able to view your health information using other applications (apps) compatible with our system.

## 2024-09-06 NOTE — ED ADULT TRIAGE NOTE - CHIEF COMPLAINT QUOTE
NATHANIEL YANY TAYLOR DIAZ    Patient Age: 68 year old   Refill request by: Phone.  Caller informed to check with the pharmacy later for their refill.  If problems arise, we will contact patient.  Refill to be: ePrescribed to [facility] pharmacy    Medication requested to be refilled:   metformin (GLUCOPHAGE) 1000 MG tablet    Patient requesting a 90 days supply of medication. Per patient has 2 days worth of medication. Message confirmed.  Routed to provider's clinical pool.   Caller advised refill may take up to 72 business hours to process.          WEIGHT AND HEIGHT:   Wt Readings from Last 1 Encounters:   07/03/20 96.6 kg (213 lb)     Ht Readings from Last 1 Encounters:   07/03/20 5' 6\" (1.676 m)     BMI Readings from Last 1 Encounters:   07/03/20 34.38 kg/m²       ALLERGIES:  Lisinopril  Current Outpatient Medications   Medication   • glimepiride (AMARYL) 4 MG tablet   • hydrochlorothiazide (HYDRODIURIL) 25 MG tablet   • HYDROcodone-acetaminophen (NORCO) 5-325 MG per tablet   • insulin NPH (HUMULIN N, NOVOLIN N) 100 UNIT/ML injectable suspension   • sildenafil (VIAGRA) 50 MG tablet   • aspirin 325 MG tablet   • dutasteride (AVODART) 0.5 MG capsule   • carvedilol (COREG) 6.25 MG tablet   • clopidogrel (PLAVIX) 75 MG tablet   • Insulin Pen Needle (PEN NEEDLES) 30G X 8 MM Misc   • Krill Oil 500 MG Cap   • Pyridoxine HCl (B-6) 100 MG Tab   • losartan (COZAAR) 100 MG tablet   • metformin (GLUCOPHAGE) 1000 MG tablet   • pantoprazole (PROTONIX) 40 MG tablet   • atorvastatin (LIPITOR) 80 MG tablet   • acetaminophen (TYLENOL) 325 MG tablet   • Insulin Syringe-Needle U-100 (BD INSULIN SYRINGE U/F) 31G X 5/16\" 0.3 ML Misc     No current facility-administered medications for this visit.      PHARMACY to use: Shown below            Pharmacy preference(s) on file:   [facility] [address] [place], [place] - [address]  [address]  [facility] [address]  Phone: [phone] Fax: [phone]      CALL BACK INFO: Ok to leave  response (including medical information) with family member or on answering machine  ROUTING: Patient's physician/staff        PCP: Catrachito Cheung MD         INS: Payor: MEDICARE / Plan: PARTA AND B / Product Type: MEDICARE   PATIENT ADDRESS:  [address] [place], [place] [id number]   Pt c/o neck pain due to C2 neck fracture x 9/2 after falling off of 3ft stage during presentation. History of Fusion to majority of C spine.

## 2024-09-06 NOTE — ED ADULT TRIAGE NOTE - PATIENT'S PREFERRED PRONOUN
[de-identified] : Ms. Quijano presented today for evaluation and management of upper abdominal pain.  She stated the pain has been present for 2 years.  She stated over the last 1 year, she has been having more frequent episodes of pain, which typically happens at night.  She stated dinner is typically her biggest meal of the day, but she has not identified specific food triggers of the pain.  She noted the pain typically resolves spontaneously overnight.  She stated one month ago, the pain occurred but lasted for 10 days.  The pain radiates into the back.  She noted nausea and vomiting associated with the pain.  She denied associated fever, chills, diarrhea, or constipation.  Her/She

## 2024-09-06 NOTE — ED PROVIDER NOTE - CLINICAL SUMMARY MEDICAL DECISION MAKING FREE TEXT BOX
Pt with recent C2 fracture on CT at [facility]SH, history or PCF C2-C3, NIELS C3-C7 with reinstrumentation, bone graft, presents for imaging of spine per her orthopedist Dr. Ricardo. No neuro deficits. Pain mod-severe, has been taking ibuprofen and tylenol with some relief.  Pt is in C-collar, ttp of c-spine at C2-3. No deformity.

## 2024-09-06 NOTE — ED ADULT NURSE NOTE - NSFALLUNIVINTERV_ED_ALL_ED
Bed/Stretcher in lowest position, wheels locked, appropriate side rails in place/Call bell, personal items and telephone in reach/Instruct patient to call for assistance before getting out of bed/chair/stretcher/Non-slip footwear applied when patient is off stretcher/[place] to call system/Physically safe environment - no spills, clutter or unnecessary equipment/Purposeful proactive rounding/Room/bathroom lighting operational, light cord in reach

## 2024-09-06 NOTE — ED PROVIDER NOTE - CARE PLAN
Principal Discharge DX:	Cervical spine fracture   1 Principal Discharge DX:	Closed odontoid fracture   Principal Discharge DX:	Type III fracture of odontoid process

## 2024-09-06 NOTE — ED PROVIDER NOTE - PROGRESS NOTE DETAILS
Spoke at length with orthopedics recommend nonop management. Patient will follow-up with Dr. Ricardo on this coming Monday.

## 2024-09-06 NOTE — ED PROVIDER NOTE - OBJECTIVE STATEMENT
67 yo F w PMH of PCF C2-C3, NIELS C3-C7 with reinstrumentation, bone graft, p/w neck pain s/p recent 3 foot fall on 9/2/24 with fracture of C2 found at OSH. Pt does not have imaging. Presents in neck brace. She has been in contact with her spinal surgeon, Dr. Ricardo at [facility], who instructed her to come in for evaluation. She has pain in cervical spine. No weakness/numbness/paresthesia.

## 2024-09-06 NOTE — ED ADULT NURSE NOTE - CHIEF COMPLAINT QUOTE
English
Pt c/o neck pain due to C2 neck fracture x 9/2 after falling off of 3ft stage during presentation. History of Fusion to majority of C spine.

## 2024-09-06 NOTE — ED ADULT NURSE NOTE - OBJECTIVE STATEMENT
Pt is 68 y.o female pt here for neck pain. Pt has C2 neck fracture x 9/2 after falling off of 3ft stage during presentation. History of Fusion to majority of C spine. Pt has C collar in placed. Pt A&Ox4, conversive in full sentences and ambulatory. Denies numbness or tingling. Assessment ongoing.

## 2024-09-06 NOTE — ED PROVIDER NOTE - NSFOLLOWUPINSTRUCTIONS_ED_ALL_ED_FT
Follow up with your primary medical doctor as soon as possible.    Return to the emergency department if your symptoms worsen or if you develop new symptoms.  If you have any problems with followup, please call the ED Referral Coordinator at [phone]. Follow up with Dr. Ricardo as scheduled on this coming Monday.    Return to the emergency department if your symptoms worsen or if you develop new symptoms.  If you have any problems with followup, please call the ED Referral Coordinator at [phone].    Take medication for pain:  Ibuprofen 600 mg every 6 hours and tylenol 650 mg every 6 hours (can be taken at the same time).  If the above medications are not working, take 0.5 tab of morphine every 4 hours as needed. Follow up with Dr. Ricardo as scheduled on this coming Monday. Call [phone] to schedule.    Follow up with your primary medical doctor as soon as possible.    Return to the emergency department if your symptoms worsen or if you develop new symptoms.  If you have any problems with followup, please call the ED Referral Coordinator at [phone].     Take medication for pain:  Ibuprofen 600 mg every 6 hours and tylenol 650 mg every 6 hours (can be taken at the same time).  If the above medications are not working, take 0.5 tab of morphine every 4 hours as needed.    Stable Cervical Spine Fracture  The skull and cervical spine showing a fracture in one of the bones of the cervical spine.  A cervical spine fracture is a break or crack in one of the seven bones (vertebrae) that make up the spine of the neck. These bones hold up the head and protect the spinal cord. The spinal cord runs through the center of the vertebrae. The fracture is considered stable if there is a very low risk of problems happening during healing and if the bones have not moved out of position.    What are the causes?  This condition may be caused by:  Motor vehicle accidents.  Injuries from sports such as diving, football, biking, wrestling, or skiing.  Severe osteoporosis or other bone diseases. These may include:  Cancers that spread to the bone.  Metabolic conditions that cause bone weakness.  What are the signs or symptoms?  Symptoms of this condition include:  Severe neck pain after an accident or fall. Pain may spread (radiate) down the shoulders or arms.  Bruising or swelling on the back of the neck.  Numbness, tingling, sudden muscle tightening (spasms), or weakness in the arms or legs or both.  How is this diagnosed?  This condition may be diagnosed based on:  Your medical history.  A physical exam of your neck, arms, and legs.  Imaging tests of your neck, such as X-rays, a CT scan, or an MRI.  How is this treated?  This condition is treated with:  A device that supports your chin and the back of your head (neck brace or cervical collar). This will keep your neck from moving during the healing process.  Medicine to help with pain, if needed.  Follow these instructions at home:  If you have a neck brace or cervical collar:    Wear the brace or collar as told by your health care provider. Do not remove it unless told by your health care provider.  Check the skin around the brace or collar every day. Tell your health care provider about any concerns.  If you are allowed to remove your collar or brace for cleaning and bathing:  Follow instructions about how to remove it safely.  Clean it by hand with mild soap and water and let it air-dry completely. The pads can be hand-washed with soap and water and air-dried.  Wash and thoroughly dry the skin on your neck. Tell your health care provider if the skin has irritation or sores.  Ask your health care provider before making any adjustments to your collar or brace. Small adjustments may be needed over time to improve comfort.  If your brace or collar is not waterproof:  Do not let it get wet.  Cover it with a watertight covering when you take a bath or shower.  Managing pain, stiffness, and swelling    A bag of ice on a towel on the skin.  If directed, put ice on the injured area. To do this:  If you have a removable brace or cervical collar, remove it as told by your health care provider.  Put ice in a plastic bag.  Place a towel between your skin and the bag.  Leave the ice on for 20 minutes, 2–3 times a day.  If your skin turns bright red, remove the ice right away to prevent skin damage. The risk of skin damage is higher if you cannot feel pain, heat, or cold.  Activity    Ask your health care provider:  When it is safe to drive if you have a brace or collar on your neck.  If the medicine prescribed to you requires you to avoid driving or using machinery.  You may have to avoid lifting. Ask your health care provider how much you can safely lift.  Return to your normal activities as told by your health care provider. Ask your health care provider what activities are safe for you.  General instructions    Take over-the-counter and prescription medicines only as told by your health care provider.  Do not take baths, swim, or use a hot tub until your health care provider approves. Ask your health care provider if you may take showers. You may only be allowed to take sponge baths.  Do not use any products that contain nicotine or tobacco. These products include cigarettes, chewing tobacco, and vaping devices, such as e-cigarettes. These can delay bone healing. If you need help quitting, ask your health care provider.  Your medicines may cause constipation. To prevent or treat constipation, you may need to:  Drink enough fluid to keep your pee (urine) pale yellow.  Take over-the-counter or prescription medicines.  Eat foods that are high in fiber, such as beans, whole grains, and fresh fruits and vegetables.  Limit foods that are high in fat and processed sugars, such as fried or sweet foods.  Keep all follow-up visits. These visits will help prevent long-term (chronic) or permanent problems. You may need follow-up X-rays or an MRI 1–3 weeks after your injury.  Contact a health care provider if:  You have irritation or sores on your skin from your brace or cervical collar.  You have neck pain that gets worse.  You have any of the following problems in your arms or legs or both:  Numbness.  Weakness.  Burning pain.  Movement problems.  You are unable to control when you pee or poop (incontinence).  You have problems with coordination or difficulty walking.  Get help right away if:  You have swelling in your neck.  You have trouble swallowing or breathing.  These symptoms may be an emergency. Get help right away. Call 911.  Do not wait to see if the symptoms will go away.  Do not drive yourself to the hospital.  This information is not intended to replace advice given to you by your health care provider. Make sure you discuss any questions you have with your health care provider.

## 2024-09-06 NOTE — ED PROVIDER NOTE - CARE PROVIDER_API CALL
Parish Ricardo  Orthopaedic Surgery  [address] [place] [address], [place] [address]  Phone: [phone]  Fax: [phone]  Follow Up Time: 1-3 Days

## 2024-09-06 NOTE — ED PROVIDER NOTE - PHYSICAL EXAMINATION
CONSTITUTIONAL: Non-toxic; in no apparent distress  HEAD: Normocephalic; atraumatic  EYES: PERRL; EOM intact   ENMT: External appears normal  NECK: In C-collar, ttp of C2-3, no deformity.  CARD: Normal S1, S2; no murmurs, rubs, or gallops  RESP: Normal chest excursion with respiration; breath sounds clear and equal bilaterally  ABD: Soft, non-distended; non-tender  EXT: Normal ROM in all four extremities; non-tender to palpation, no peripheral edema  SKIN: Warm, dry, no rash  NEURO: CN 2-12 intact BL, no dysmetria, no pronator drift, gait normal, strength/sensation intact throughout, normal cognition

## 2024-09-06 NOTE — ED ADULT TRIAGE NOTE - HEART RATE (BEATS/MIN)
A&Ox4. VSS except one /98. Tele SR/ST; HR 90s-low 100s. Denies pain. C/o some blurry vision but feels it has improved some. Adequate UO. IVFs. Insulin gtt continued, on alg 3. BG down to 155. Ketones 1.3, Anion Gap 11. BG checks q 1 hr. Up w/ SBA.    102

## 2024-09-06 NOTE — ED ADULT NURSE NOTE - CAS EDP DISCH TYPE
Benefits, risks, and possible complications of procedure explained to patient/caregiver who verbalized understanding and gave verbal consent. Home

## 2024-09-07 PROBLEM — F41.0 PANIC DISORDER [EPISODIC PAROXYSMAL ANXIETY]: Chronic | Status: ACTIVE | Noted: 2022-01-05

## 2024-09-07 NOTE — CONSULT NOTE ADULT - SUBJECTIVE AND OBJECTIVE BOX
Orthopaedic Surgery Consult Note    For Surgeon:    HPI:  68yFemale  Patient is a 68y old  Female who presents with a chief complaint of   HPI:      Allergies    No Known Allergies    Intolerances      PAST MEDICAL & SURGICAL HISTORY:  Cervical stenosis of spine      H/O: hypothyroidism      Panic attack      Surgery, elective  left foot x 6 surgeries      Surgery, elective  shoulder -right x 3      Surgery, elective  infertility procedures      S/P cervical spinal fusion  ACDF C3-7      H/O left knee surgery        MEDICATIONS  (STANDING):    MEDICATIONS  (PRN):      Vital Signs Last 24 Hrs  T(C): 37 (06 Sep 2024 23:52), Max: 37 (06 Sep 2024 23:52)  T(F): 98.6 (06 Sep 2024 23:52), Max: 98.6 (06 Sep 2024 23:52)  HR: 91 (06 Sep 2024 23:52) (91 - 102)  BP: 102/68 (06 Sep 2024 23:52) (102/68 - 106/63)  BP(mean): --  RR: 16 (06 Sep 2024 23:52) (16 - 17)  SpO2: 98% (06 Sep 2024 23:52) (98% - 100%)    Parameters below as of 06 Sep 2024 23:52  Patient On (Oxygen Delivery Method): room air        Physical Exam:                          9.7    12.00 )-----------( 474      ( 06 Sep 2024 20:26 )             31.7     09-06    139  |  101  |  21  ----------------------------<  90  3.5   |  28  |  0.72    Ca    9.6      06 Sep 2024 20:26        Imaging:     A/P: 68yFemale    -Discussed with Dr. Cole Pager [phone]

## 2025-06-25 NOTE — ED PROVIDER NOTE - NS ED ROS FT
Orthopedic Surgery Focus Note    Following in regards to RIGHT hip abscess with infected intramedullary nail and osteomyelitis of the right femur now POD#5 s/p I&D down to and including bone with removal of right femoral nail. Patient remains on IV antibiotics per ID recommendations with PICC placement today per chart review.     Hemovac drain outputs reviewed - 90 cc and 20 cc last two shifts.   Maintain Hemovac drain today. Continue to empty and record output q shift.   Plan to remove drain once output is <15 cc per consecutive shift.   Pending course, patient may discharge with drain in place and follow up in clinic next week with Sarah Alba PA-C for drain removal    Please reach out with any questions of concerns. Will continue to follow.       Sima Mac PA-C  Available via Epic Secure Chat ([phone])  Supervising Physician for this patient encounter: Wood Jack MD      Please page the on-call Ortho provider for urgent needs outside of above hours.   CONSTITUTIONAL: No fever, no chills, no fatigue  EYES: No eye redness, no visual changes  ENT: No ear pain, no sore throat  CARDIOVASCULAR: No chest pain, no palpitations  RESPIRATORY: No cough, no SOB  GI: No abdominal pain, no nausea, no vomiting, no constipation, no diarrhea  GENITOURINARY: No dysuria, no frequency, no hematuria  MUSCULOSKELETAL: No back pain, no joint pain, no myalgias, + neck pain  SKIN: No rash, no peripheral edema  NEURO: No headache, no confusion    ALL OTHER SYSTEMS NEGATIVE

## (undated) DEVICE — GLV 7.5 PROTEXIS (WHITE)

## (undated) DEVICE — VENODYNE/SCD SLEEVE CALF MEDIUM

## (undated) DEVICE — PREP CHLORAPREP HI-LITE ORANGE 26ML

## (undated) DEVICE — DRAPE INSTRUMENT POUCH 6.75" X 11"

## (undated) DEVICE — DRAPE BACK TABLE COVER 80X90"

## (undated) DEVICE — FOLEY TRAY 16FR 5CC LF UMETER CLOSED

## (undated) DEVICE — DRAPE 3/4 SHEET 52X76"

## (undated) DEVICE — PACK SPINE

## (undated) DEVICE — WARMING BLANKET LOWER ADULT

## (undated) DEVICE — DRAPE LIGHT HANDLE COVER (BLUE)

## (undated) DEVICE — DRAPE 1/2 SHEET 40X57"

## (undated) DEVICE — STAPLER SKIN PROXIMATE

## (undated) DEVICE — GLV 8 PROTEXIS (WHITE)